# Patient Record
Sex: FEMALE | Race: WHITE | Employment: OTHER | URBAN - METROPOLITAN AREA
[De-identification: names, ages, dates, MRNs, and addresses within clinical notes are randomized per-mention and may not be internally consistent; named-entity substitution may affect disease eponyms.]

---

## 2017-01-16 PROBLEM — H72.90 CHRONIC TUBOTYMPANIC DISEASE WITH ANTERIOR PERFORATION OF TYMPANIC MEMBRANE: Chronic | Status: ACTIVE | Noted: 2017-01-16

## 2017-01-16 PROBLEM — R73.09 ELEVATED HEMOGLOBIN A1C: Chronic | Status: ACTIVE | Noted: 2017-01-16

## 2017-01-16 PROBLEM — H66.10 CHRONIC TUBOTYMPANIC DISEASE WITH ANTERIOR PERFORATION OF TYMPANIC MEMBRANE: Chronic | Status: ACTIVE | Noted: 2017-01-16

## 2017-01-16 PROBLEM — R73.09 ELEVATED HEMOGLOBIN A1C: Status: ACTIVE | Noted: 2017-01-16

## 2017-06-30 ENCOUNTER — HOSPITAL ENCOUNTER (OUTPATIENT)
Dept: MAMMOGRAPHY | Age: 72
Discharge: HOME | End: 2017-06-30
Attending: INTERNAL MEDICINE
Payer: MEDICARE

## 2017-06-30 DIAGNOSIS — Z12.31 ENCOUNTER FOR SCREENING MAMMOGRAM FOR MALIGNANT NEOPLASM OF BREAST: ICD-10-CM

## 2017-06-30 PROCEDURE — 77067 SCR MAMMO BI INCL CAD: CPT

## 2017-08-01 PROBLEM — F33.0 MILD EPISODE OF RECURRENT MAJOR DEPRESSIVE DISORDER (HCC): Chronic | Status: ACTIVE | Noted: 2017-08-01

## 2018-02-22 PROBLEM — E55.9 VITAMIN D DEFICIENCY: Chronic | Status: ACTIVE | Noted: 2018-02-22

## 2018-11-06 ENCOUNTER — HOSPITAL ENCOUNTER (OUTPATIENT)
Dept: MAMMOGRAPHY | Age: 73
Discharge: HOME | End: 2018-11-06
Payer: MEDICARE

## 2018-11-06 DIAGNOSIS — Z78.0 POST-MENOPAUSAL: ICD-10-CM

## 2018-11-06 DIAGNOSIS — Z12.31 VISIT FOR SCREENING MAMMOGRAM: ICD-10-CM

## 2018-11-06 PROCEDURE — 77080 DXA BONE DENSITY AXIAL: CPT

## 2018-11-06 PROCEDURE — 77063 BREAST TOMOSYNTHESIS BI: CPT

## 2018-11-10 NOTE — PROGRESS NOTES
Your mammogram shows a 13 mm oval nodule in the left breast that appears like a cyst.  It has not changed in the appearance. There is no other significant findings on your mammogram.  Recommend repeat in one year.

## 2018-11-10 NOTE — PROGRESS NOTES
Your bone density scan shows improvement in the density of the bones since the last bone density in 2014. Continue your calcium and vitamin D supplementation.

## 2019-02-18 ENCOUNTER — HOSPITAL ENCOUNTER (OUTPATIENT)
Dept: GENERAL RADIOLOGY | Age: 74
Discharge: HOME | End: 2019-02-18

## 2019-02-18 DIAGNOSIS — M79.672 CHRONIC PAIN IN LEFT FOOT: ICD-10-CM

## 2019-02-18 DIAGNOSIS — M25.551 CHRONIC HIP PAIN, RIGHT: ICD-10-CM

## 2019-02-18 DIAGNOSIS — G89.29 CHRONIC PAIN IN LEFT FOOT: ICD-10-CM

## 2019-02-18 DIAGNOSIS — G89.29 CHRONIC HIP PAIN, RIGHT: ICD-10-CM

## 2019-02-18 NOTE — PROGRESS NOTES
Your foot x-ray shows degenerative joint space narrowing on the toe joints. No evidence of fracture.

## 2019-06-04 ENCOUNTER — HOSPITAL ENCOUNTER (OUTPATIENT)
Dept: LAB | Age: 74
Discharge: HOME | End: 2019-06-04

## 2019-06-04 PROCEDURE — 88305 TISSUE EXAM BY PATHOLOGIST: CPT

## 2019-07-10 ENCOUNTER — HOSPITAL ENCOUNTER (OUTPATIENT)
Dept: PHYSICAL THERAPY | Age: 74
Discharge: HOME | End: 2019-07-10
Payer: MEDICARE

## 2019-07-10 PROCEDURE — 97110 THERAPEUTIC EXERCISES: CPT

## 2019-07-10 PROCEDURE — 97161 PT EVAL LOW COMPLEX 20 MIN: CPT

## 2019-07-10 NOTE — PROGRESS NOTES
Anaid Beal  : 1945  Primary: Avis Thurman Medicare Advantage  Secondary:  [address] at [facility]  [address] , [address], [place] [address]  [phone]   [phone]                                  OUTPATIENT PHYSICAL THERAPY: Daily Treatment Note 7/10/2019  ICD-10: Treatment Diagnosis: low back pain (M54.5)  Precautions/Allergies:   Demerol [meperidine]   TREATMENT PLAN:  Effective Dates: 7/10/2019 TO 8/10/2019 (30 days). Frequency/Duration: 2 times a week for 30 Day(s)     MEDICAL/REFERRING DIAGNOSIS:  Other specified dorsopathies, site unspecified [M53.80]  Spinal stenosis, lumbar region without neurogenic claudication [M48.061]  Spondylosis without myelopathy or radiculopathy, lumbosacral region [M47.817]  Other intervertebral disc degeneration, lumbar region [M51.36]   DATE OF ONSET: 2.5 years  REFERRING PHYSICIAN: Sadie Dorantse MD MD Orders: Jung Moses and Treat  Return MD Appointment:  Not scheduled     Pre-treatment Symptoms/Complaints:  Anaid Beal complains of left sided low back pain with ER of right hip. Pain: Initial: Pain Intensity 1: 2  Pain Location 1: Back  Post Session:  2/10   Medications Last Reviewed:  7/10/2019  Updated Objective Findings:  See today's eval   TREATMENT:   THERAPEUTIC EXERCISE: (15 minutes):  Exercises per grid below to improve mobility, strength and coordination. Required minimal verbal cues to promote proper body mechanics. Progressed resistance, range and repetitions as indicated. Access Code: [email or web address]   URL: [email or web address]   Date: 07/10/2019   Prepared by:  Toro Trevino     Exercises   Supine Lower Trunk Rotation - 10 reps - 2x daily   Supine Single Knee to Chest Stretch - 10 reps - 3 hold - 2x daily   Supine Piriformis Stretch with Foot on Ground - 10 reps - 3 hold - 2x daily   Sidelying Open Book Thoracic Rotation with Knee on Foam Roll - 10 reps - 2x daily      Date:  7-10-19 Date:   Date:     Activity/Exercise Parameters Parameters Parameters   HEP As above                                           Treatment/Session Summary:    · Response to Treatment: Dino Gonzalez demonstrates good understanding of initial HEP. · Communication/Consultation:  HEP  · Equipment provided today:  HEP instruction sheet  · Recommendations/Intent for next treatment session: Next visit will focus on lumbar stabilization.     Total Treatment Billable Duration:  15 min therex  PT Patient Time In/Time Out  Time In: 0945  Time Out: Sunny Quigley PT    Future Appointments   Date Time Provider Sil Briceno   7/16/2019  9:00 AM Merlin Espinoza PT, DPT [facility]   7/18/2019  9:45 AM Merlin Espinoza PT, DPT [facility]   7/23/2019  9:45 AM J Carlos Dolan PT [facility]   7/25/2019  9:45 AM J Carlos Dolan PT [facility]   7/30/2019  9:45 AM Merlin Espinoza PT, DPT [facility]   8/1/2019  9:45 AM J Carlos Dolan PT [facility]   8/6/2019  9:45 AM Merlin Espinoza PT, DPT [facility]   8/8/2019  9:45 AM Merlin Espinoza PT, DPT [facility]   10/8/2019  7:50 AM IMD LAB [facility] IMD IMD   10/15/2019 10:00 AM Tracy Herrera NP [facility] IMD IMD

## 2019-07-10 NOTE — THERAPY EVALUATION
Samiautumn Aguilar : 1945 Primary: Justin Graves Medicare Advantage Secondary:  [facility], [address], [place] [address] [phone]   [phone] OUTPATIENT PHYSICAL THERAPY:Initial Assessment 7/10/2019 ICD-10: Treatment Diagnosis: low back pain (M54.5) Precautions/Allergies:  
Demerol [meperidine] TREATMENT PLAN: 
Effective Dates: 7/10/2019 TO 8/10/2019 (30 days). Frequency/Duration: 2 times a week for 30 Day(s) MEDICAL/REFERRING DIAGNOSIS: 
Other specified dorsopathies, site unspecified [M53.80] Spinal stenosis, lumbar region without neurogenic claudication [M48.061] Spondylosis without myelopathy or radiculopathy, lumbosacral region [M47.817] Other intervertebral disc degeneration, lumbar region [M51.36] DATE OF ONSET: 2.5 years REFERRING PHYSICIAN: Oc Benitez MD MD Orders: Ana Maria Lazcano and Treat Return MD Appointment:  Not scheduled INITIAL ASSESSMENT:  Ms. Jose F Lopez presents with complaints of intermittent  left sided low back pain. Pt reports pain usually occurs when she externally rotates her right hip. She reports she can obtain relief with pulling right knee towards left shoulder. Pt may benefit from PT to address the following problem list.  
PROBLEM LIST (Impacting functional limitations): 1. Decreased ADL/Functional Activities 2. Increased Pain 3. Decreased Activity Tolerance 4. Decreased Flexibility/Joint Mobility INTERVENTIONS PLANNED: 
1. Home Exercise Program (HEP) 2. Manual Therapy 3. Therapeutic Exercise/Strengthening GOALS: (Goals have been discussed and agreed upon with patient.) Short-Term Functional Goals: Time Frame: 2 weeks 1. Independent in initial HEP 2. Increase lumbar flexion to at lest 85% Discharge Goals: Time Frame: 4 weeks 1. Independent in advanced HEP 2. Minimal pain with stand /walk up to 1 hour 3.  Demonstrate 100% lumbar ROM 
CLINICAL DECISION MAKING:  
 Outcome Measure: Tool Used: Modified Oswestry Low Back Pain Questionnaire Score:  Initial: 18/50  (Date: 7-11-19 ) Most Recent: X/50 (Date: -- ) Interpretation of Score: Each section is scored on a 0-5 scale, 5 representing the greatest disability. The scores of each section are added together for a total score of 50. Medical Necessity:  
· Patient demonstrates good rehab potential due to higher previous functional level. Reason for Services/Other Comments: 
· Patient continues to require modification of therapeutic interventions to increase complexity of exercises. PT Patient Time In/Time Out Time In: 0945 Time Out: 1015 Rehabilitation Potential For Stated Goals: Good Regarding Grey Evans's therapy, I certify that the treatment plan above will be carried out by a therapist or under their direction. Thank you for this referral, 
Man Cohen, PT Referring Physician Signature: Daniel Bond MD              Date The information in this section was collected on 7-11-19 (except where otherwise noted). HISTORY:  
History of Present Injury/Illness (Reason for Referral): 
Atha Solid reports insidious onset of low back pain and sciatica on the left. Injection provided good relief x 1 year. Pain has gradually returned, but so far no radiating pain. Past Medical History/Comorbidities: Ms. 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 Ms. Kia Hernandez  has a past surgical history that includes hx hysterectomy; hx tonsillectomy; and hx colonoscopy. Social History/Living Environment:  
  Lives with spouse in [address] [place] residence. Prior Level of Function/Work/Activity: 
Retired but active walker Dominant Side:  
      [facility] H2 Model Falls Risk Assessment Risk Factors: 
     No Risk Factors Identified Ability to Rise from Chair: 
     (0)  Ability to rise in a single movement Falls Prevention Plan: No modifications necessary Total: (5 or greater = High Risk): 0  
 ©2010 [facility] . [facility] States Patent #[id number]. Federal Law prohibits the replication, distribution or use without written permission from [facility] Possibility Space Current Medications:   
  
Current Outpatient Medications:  
  ergocalciferol (ERGOCALCIFEROL) 50,000 unit capsule, Take 1 Cap by mouth every Monday and Thursday. , Disp: 24 Cap, Rfl: 3 
  pravastatin (PRAVACHOL) 40 mg tablet, Take 1 Tab by mouth daily. , Disp: 90 Tab, Rfl: 3 
  meloxicam (MOBIC) 15 mg tablet, Take 1 Tab by mouth daily. , Disp: 90 Tab, Rfl: 3 
  pantoprazole (PROTONIX) 40 mg tablet, Take 1 Tab by mouth daily. , Disp: 90 Tab, Rfl: 3 
  ALPRAZolam (XANAX) 0.5 mg tablet, Take 1 Tab by mouth nightly as needed for Anxiety. Max Daily Amount: 0.5 mg., Disp: 30 Tab, Rfl: 5 
  aspirin delayed-release 81 mg tablet, Take 81 mg by mouth daily. , Disp: , Rfl:  
  citalopram (CELEXA) 20 mg tablet, Take 1 Tab by mouth daily.  Indications: major depressive disorder, Disp: 90 Tab, Rfl: 3 
  fluticasone (FLONASE) 50 mcg/actuation nasal spray, 2 Sprays by Both Nostrils route nightly. Indications: Allergic Rhinitis, Disp: 3 Bottle, Rfl: 3 
  clotrimazole (LOTRIMIN) 1 % topical cream, Apply  to affected area two (2) times a day., Disp: , Rfl:  
  psyllium (METAMUCIL) powd, Take 10 mL by mouth daily. , Disp: , Rfl:  
  vitamin E (AQUA GEMS) 400 unit capsule, Take 400 Units by mouth daily. , Disp: , Rfl:  
  CALCIUM CARBONATE/VITAMIN D3 (CALCIUM 600 + D,3, PO), Take 1 Tab by mouth daily. , Disp: , Rfl:  
  SENNOSIDES (SENNA LAX PO), Take 1 Tab by mouth daily. , Disp: , Rfl:  
  MULTIVITAMIN PO, Take 1 Tab by mouth daily. , Disp: , Rfl:   
Date Last Reviewed:  7/10/2019 Number of Personal Factors/Comorbidities that affect the Plan of Care: 0: LOW COMPLEXITY EXAMINATION:  
Observation/Orthostatic Postural Assessment:   
      Rigid erect seated posture Palpation: Moderate TTP right piriformis ROM:   
      Lumbar ROM within functional limits with exception of flexion limited to 75% with increased left paraspinal pain. BLE full AROM including right hip. Strength: BLE's 5/5 strength Special Tests:   
      SLR (-) BLE right hip compression (-) Body Structures Involved: 1. Joints 2. Muscles Body Functions Affected: 1. Movement Related Activities and Participation Affected: 1. General Tasks and Demands Number of elements (examined above) that affect the Plan of Care: 1-2: LOW COMPLEXITY CLINICAL PRESENTATION:  
Presentation: Stable and uncomplicated: LOW COMPLEXITY Use of outcome tool(s) and clinical judgement create a POC that gives a: Clear prediction of patient's progress: LOW COMPLEXITY Treatment/Session Assessment:   
· Response to Treatment:  Pt reports understanding of and agreement with treatment plan. · Compliance with Program/Exercises: Will assess as treatment progresses. · Recommendations/Intent for next treatment session: \"Next visit will focus on advancements to more challenging activities\". Future Appointments Date Time Provider Sil Kaity 7/16/2019  9:00 AM Rodas Crooked, PT, DPT [facility]  
7/18/2019  9:45 AM Rodas Crooked, PT, DPT [facility]  
7/23/2019  9:45 AM Zane Rojas, PT [facility]  
7/25/2019  9:45 AM Zane Rojas, PT [facility]  
7/30/2019  9:45 AM Rodas Crooked, PT, DPT [facility]  
8/1/2019  9:45 AM Zane Rojas, PT [facility]  
8/6/2019  9:45 AM Rodas Crooked, PT, DPT [facility]  
8/8/2019  9:45 AM Rodas Crooked, PT, DPT [facility]  
10/8/2019  7:50 AM IMD LAB [facility] IMD IMD  
10/15/2019 10:00 AM Suzanne Cueva NP [facility] IMD IMD  
 
Please explain any variance from Plan of Care.  
Total Treatment Duration: 25 min daniele Uribe, PT

## 2019-07-18 ENCOUNTER — HOSPITAL ENCOUNTER (OUTPATIENT)
Dept: PHYSICAL THERAPY | Age: 74
Discharge: HOME | End: 2019-07-18
Payer: MEDICARE

## 2019-07-18 PROCEDURE — 97140 MANUAL THERAPY 1/> REGIONS: CPT

## 2019-07-18 PROCEDURE — 97110 THERAPEUTIC EXERCISES: CPT

## 2019-07-18 NOTE — PROGRESS NOTES
Emily Pope  : 1945  Primary: Briana Guerra Medicare Advantage  Secondary:  [address] at [facility]  [address] , [address], [place] [address]  [phone]   [phone]                                  OUTPATIENT PHYSICAL THERAPY: Daily Treatment Note 2019  ICD-10: Treatment Diagnosis: low back pain (M54.5)  Precautions/Allergies:   Demerol [meperidine]   TREATMENT PLAN:  Effective Dates: 7/10/2019 TO 8/10/2019 (30 days). Frequency/Duration: 2 times a week for 30 Day(s)     MEDICAL/REFERRING DIAGNOSIS:  Other specified dorsopathies, site unspecified [M53.80]  Spinal stenosis, lumbar region without neurogenic claudication [M48.061]  Spondylosis without myelopathy or radiculopathy, lumbosacral region [M47.817]  Other intervertebral disc degeneration, lumbar region [M51.36]   DATE OF ONSET: 2.5 years  REFERRING PHYSICIAN: Nicky Almeida MD MD Orders: Kely Smiley and Treat  Return MD Appointment:  Not scheduled     Pre-treatment Symptoms/Complaints:  Emily Pope reports increased back symptoms with exercises. Does not know exactly which exercise gives her more pain. Also reports Right groin pain    Pain: Initial: Pain Intensity 1: 2  Pain Location 1: (back and groin) 3/10 Post Session:  2/10 in groin   Medications Last Reviewed:  2019  Updated Objective Findings:  See today's eval   TREATMENT:   THERAPEUTIC EXERCISE: (30 minutes):  Exercises per grid below to improve mobility, strength and coordination. Required minimal verbal cues to promote proper body mechanics. Progressed resistance, range and repetitions as indicated. Access Code: [email or web address]   URL: [email or web address]   Date: 07/10/2019   Prepared by:  Toro Trevino     Exercises   Supine Single Knee to Chest Stretch - 10 reps - 3 hold - 2x daily   Supine Piriformis Stretch with Foot on Ground - 10 reps - 3 hold - 2x daily   Lumbar flexion/ double knee to  Chest for back decompression     Date:  7-10-19 Date:  7-18-19 Date:     Activity/Exercise Parameters Parameters Parameters   HEP As above As above    Single knee to chest  60 sec    Double knee to chest  60 sec, 2x    Core stabilization  90/90 position, feet supported on table, core contraction w/ adductor squeeze, 10 sec hold, 10x    Lumbar flexion  10x , in sitting stabilized pelvis from moving                  Manual therapy (10 min): to improve mobility  · R hip inferior glide, grade III    Treatment/Session Summary:    · Response to Treatment: Roman Corey tolerated the session well. Her pain seemed to be increased with extension related activities and relieved with flexion at lumbar spine. She still had R groin pain after the session that was difficult to determine whether it was hip joint or lumbar spine related. Discussed for her to be more aware of what increases and decreases that pain. · Communication/Consultation:  HEP  · Equipment provided today:  HEP instruction sheet  · Recommendations/Intent for next treatment session: Next visit will focus on lumbar stabilization.     Total Treatment Billable Duration:  30 min therex, 10 minn  PT Patient Time In/Time Out  Time In: 2158  Time Out: [address], PT, DPT    Future Appointments   Date Time Provider Sil Briceno   7/23/2019  9:45 AM Cannuzhat Moncada, PT [facility]   7/25/2019  9:45 AM Cannuzhat Moncada, PT [facility]   7/30/2019  9:45 AM Alexia Duarte, PT, DPT [facility]   8/1/2019  9:45 AM Cannuzhat Moncada, PT [facility]   8/6/2019  9:45 AM Alexia Duarte, PT, DPT [facility]   8/8/2019  9:45 AM Alexia Duarte, PT, DPT [facility]   10/8/2019  7:50 AM IMD LAB [facility] I[facility] IMD   10/15/2019 10:00 AM Renu Hurd NP [facility] I[facility] IMD

## 2019-07-24 ENCOUNTER — HOSPITAL ENCOUNTER (OUTPATIENT)
Dept: PHYSICAL THERAPY | Age: 74
Discharge: HOME | End: 2019-07-24
Payer: MEDICARE

## 2019-07-24 PROCEDURE — 97110 THERAPEUTIC EXERCISES: CPT

## 2019-07-24 NOTE — PROGRESS NOTES
Roman Corey  : 1945  Primary: Soniya David Medicare Advantage  Secondary:  [address] at [address]  [facility] [address], [place] [address]  [phone]   [phone]                                  OUTPATIENT PHYSICAL THERAPY: Daily Treatment Note 2019  ICD-10: Treatment Diagnosis: low back pain (M54.5)  Precautions/Allergies:   Demerol [meperidine]   TREATMENT PLAN:  Effective Dates: 7/10/2019 TO 8/10/2019 (30 days). Frequency/Duration: 2 times a week for 30 Day(s)     MEDICAL/REFERRING DIAGNOSIS:  Other specified dorsopathies, site unspecified [M53.80]  Spinal stenosis, lumbar region without neurogenic claudication [M48.061]  Spondylosis without myelopathy or radiculopathy, lumbosacral region [M47.817]  Other intervertebral disc degeneration, lumbar region [M51.36]   DATE OF ONSET: 2.5 years  REFERRING PHYSICIAN: Vivian Aponte MD MD Orders: Mee Brunson and Treat  Return MD Appointment:  Not scheduled     Pre-treatment Symptoms/Complaints:  Roman Corey reports less back symptoms with change in exercises. Pain: Initial: Pain Intensity 1: 2  Pain Location 1: Back  Post Session:  2/10   Medications Last Reviewed:  2019  Updated Objective Findings:  See today's eval   TREATMENT:   THERAPEUTIC EXERCISE: (40 minutes):  Exercises per grid below to improve mobility, strength and coordination. Required minimal verbal cues to promote proper body mechanics. Progressed resistance, range and repetitions as indicated. Access Code: [email or web address]   URL: [email or web address]   Date: 07/10/2019   Prepared by:  Toro Trevino     Exercises   Supine Single Knee to Chest Stretch - 10 reps - 3 hold - 2x daily   Supine Piriformis Stretch with Foot on Ground - 10 reps - 3 hold - 2x daily   Lumbar flexion/ double knee to  Chest for back decompression     Date:  7-10-19 Date:  7-18-19 Date:  7-23-19   Activity/Exercise Parameters Parameters Parameters   HEP As above As above    Recumbent stepper   Level 1 x 10'   Ant step ups   X 10 BLE   Lat step ups   X 10 BLE   Ant heel taps   X 10 BLE   Single knee to chest  60 sec 10 x 5\" BLE   Double knee to chest  60 sec, 2x X 10   Supine piriformis   10 x 5\" BLE   Supine hip add ball squeeze   X 10   Supine hip abd TB   RTB x 10   Bridges   X 10   Core stabilization  90/90 position, feet supported on table, core contraction w/ adductor squeeze, 10 sec hold, 10x    Lumbar flexion  10x , in sitting stabilized pelvis from moving    Isometric hip flex   10 x 5\" BLE           Manual therapy (0 min): to improve mobility  · R hip inferior glide, grade III    Treatment/Session Summary:    · Response to Treatment: Martir Palm tolerated the session well. Pt reported more soreness than pain with additional activities today. · Communication/Consultation:  None today  · Equipment provided today:  None today  · Recommendations/Intent for next treatment session: Next visit will focus on lumbar stabilization.     Total Treatment Billable Duration:  40 min therearian,   PT Patient Time In/Time Out  Time In: 1000  Time Out: [facility], PT    Future Appointments   Date Time Provider Sil Longoriai   7/25/2019  7:00 PM [facility]   7/30/2019  9:45 AM Jared Mosqueda PT, DPT [facility]   8/1/2019  9:45 AM Parker Palacio PT [facility]   8/6/2019  1:00 PM Parker Palacio PT [facility]   8/13/2019  9:45 AM Jared Mosqueda PT, DPT [facility]   10/8/2019  7:50 AM IMD LAB [facility] I[facility] IMD   10/15/2019 10:00 AM Bhavana Nails, NP [facility] IMD

## 2019-07-30 ENCOUNTER — HOSPITAL ENCOUNTER (OUTPATIENT)
Dept: PHYSICAL THERAPY | Age: 74
Discharge: HOME | End: 2019-07-30
Payer: MEDICARE

## 2019-07-30 PROCEDURE — 97110 THERAPEUTIC EXERCISES: CPT

## 2019-07-30 NOTE — PROGRESS NOTES
Ministerio Coyle  : 1945  Primary: Ijeoma Fowler Medicare Advantage  Secondary:  [address] at [facility]  [address] , [address], [place] [address]  [phone]   [phone]                                  OUTPATIENT PHYSICAL THERAPY: Daily Treatment Note 2019  ICD-10: Treatment Diagnosis: low back pain (M54.5)  Precautions/Allergies:   Demerol [meperidine]   TREATMENT PLAN:  Effective Dates: 7/10/2019 TO 8/10/2019 (30 days). Frequency/Duration: 2 times a week for 30 Day(s)     MEDICAL/REFERRING DIAGNOSIS:  Other specified dorsopathies, site unspecified [M53.80]  Spinal stenosis, lumbar region without neurogenic claudication [M48.061]  Spondylosis without myelopathy or radiculopathy, lumbosacral region [M47.817]  Other intervertebral disc degeneration, lumbar region [M51.36]   DATE OF ONSET: 2.5 years  REFERRING PHYSICIAN: Riri Jenkins MD MD Orders: [place] and Treat  Return MD Appointment:  Not scheduled     Pre-treatment Symptoms/Complaints:  Ministerio Coyle reports that she has been doing well. States that she is a little sore from pulling weeds. Pain: Initial:   0/10 at rest Post Session:  0/10   Medications Last Reviewed:  2019  Updated Objective Findings:  See today's eval   TREATMENT:   THERAPEUTIC EXERCISE: (40 minutes):  Exercises per grid below to improve mobility, strength and coordination. Required minimal verbal cues to promote proper body mechanics. Progressed resistance, range and repetitions as indicated. Access Code: [email or web address]   URL: [email or web address]   Date: 07/10/2019   Prepared by:  Toro Trevino     Exercises   Supine Single Knee to Chest Stretch - 10 reps - 3 hold - 2x daily   Supine Piriformis Stretch with Foot on Ground - 10 reps - 3 hold - 2x daily   Lumbar flexion/ double knee to  Chest for back decompression Date:  7-10-19 Date:  7-18-19 Date:  7-23-19 Date:  7-25-19   Activity/Exercise Parameters Parameters Parameters    HEP As above As above  Discussed not bending forward in standing while pulling weeds and instead use a stool or go into half kneeling   Recumbent stepper   Level 1 x 10' Level 1 x10   Ant step ups   X 10 BLE    Lat step ups   X 10 BLE    Ant heel taps   X 10 BLE    Single knee to chest  60 sec 10 x 5\" BLE 10x 5 BLE   Double knee to chest  60 sec, 2x X 10 60 sec   Supine piriformis   10 x 5\" BLE 60 sec, 2x, BLE   Supine hip add ball squeeze   X 10 x10   Supine hip abd TB   RTB x 10    Bridges   X 10 x10   Core stabilization  90/90 position, feet supported on table, core contraction w/ adductor squeeze, 10 sec hold, 10x     Lumbar flexion  10x , in sitting stabilized pelvis from moving  60 sec   Isometric hip flex   10 x 5\" BLE 10 sec, 5x, BLE            Manual therapy (0 min): to improve mobility  · R hip inferior glide, grade III    Treatment/Session Summary:    · Response to Treatment: Kasey Meza tolerated the session well. She did not have any pain throughout the session and stated that she feels a lot better overall. · Communication/Consultation:  None today  · Equipment provided today:  None today  · Recommendations/Intent for next treatment session: Next visit will focus on lumbar stabilization.     Total Treatment Billable Duration:  40 min therex,   PT Patient Time In/Time Out  Time In: 2364  Time Out: [address], PT, DPT    Future Appointments   Date Time Provider Sil Briceno   8/1/2019  9:45 AM Maurilio Ahumada, PT [facility]   8/6/2019  1:00 PM Maurilio Ahumada, PT [facility]   8/13/2019  9:45 AM [facility]ima Pink [facility]   10/8/2019  7:50 AM IMD LAB DARYL IMD IMD   10/15/2019 10:00 AM Harinder Martinez NP [facility] IMD IMD

## 2019-08-08 ENCOUNTER — APPOINTMENT (OUTPATIENT)
Dept: PHYSICAL THERAPY | Age: 74
End: 2019-08-08

## 2019-08-13 ENCOUNTER — APPOINTMENT (OUTPATIENT)
Dept: PHYSICAL THERAPY | Age: 74
End: 2019-08-13

## 2020-06-28 ENCOUNTER — HOSPITAL ENCOUNTER (OUTPATIENT)
Dept: MAMMOGRAPHY | Age: 75
Discharge: HOME | End: 2020-06-28
Payer: COMMERCIAL

## 2020-06-28 DIAGNOSIS — Z12.31 ENCOUNTER FOR SCREENING MAMMOGRAM FOR BREAST CANCER: ICD-10-CM

## 2020-06-28 PROCEDURE — 77067 SCR MAMMO BI INCL CAD: CPT

## 2020-07-01 ENCOUNTER — HOSPITAL ENCOUNTER (OUTPATIENT)
Dept: LAB | Age: 75
Discharge: HOME | End: 2020-07-01

## 2020-07-01 DIAGNOSIS — R10.32 LEFT LOWER QUADRANT ABDOMINAL PAIN: ICD-10-CM

## 2020-10-06 ENCOUNTER — HOSPITAL ENCOUNTER (EMERGENCY)
Age: 75
Discharge: HOME | End: 2020-10-06
Attending: EMERGENCY MEDICINE
Payer: COMMERCIAL

## 2020-10-06 ENCOUNTER — APPOINTMENT (OUTPATIENT)
Dept: GENERAL RADIOLOGY | Age: 75
End: 2020-10-06
Attending: EMERGENCY MEDICINE
Payer: COMMERCIAL

## 2020-10-06 VITALS
SYSTOLIC BLOOD PRESSURE: 122 MMHG | HEIGHT: 60 IN | HEART RATE: 91 BPM | TEMPERATURE: 98.3 F | RESPIRATION RATE: 16 BRPM | DIASTOLIC BLOOD PRESSURE: 74 MMHG | OXYGEN SATURATION: 95 % | WEIGHT: 158 LBS | BODY MASS INDEX: 31.02 KG/M2

## 2020-10-06 DIAGNOSIS — S69.92XA INJURY OF LEFT WRIST, INITIAL ENCOUNTER: Primary | ICD-10-CM

## 2020-10-06 PROCEDURE — 75810000053 HC SPLINT APPLICATION

## 2020-10-06 PROCEDURE — 73110 X-RAY EXAM OF WRIST: CPT

## 2020-10-06 PROCEDURE — 99282 EMERGENCY DEPT VISIT SF MDM: CPT

## 2020-10-06 NOTE — DISCHARGE INSTRUCTIONS
Your x-ray is unremarkable but you do have chronic changes and tenderness over an area of the wrist that is very sensitive. We will splint your wrist regardless. We will need to follow-up with orthopedics for repeat x-rays.

## 2020-10-06 NOTE — ED TRIAGE NOTES
Patient ambulatory to triage with mask in place. Patient reports she tripped at [employer] and injured her left wrist. Pt has a small abrasion to her elbow as well.

## 2020-10-06 NOTE — ED PROVIDER NOTES
The history is provided by the patient. Hand Pain    This is a new problem. The current episode started 1 to 2 hours ago. The problem occurs constantly. The problem has not changed since onset. The pain is present in the left wrist. The quality of the pain is described as aching. The pain is at a severity of 3/10. The pain is moderate. Associated symptoms include limited range of motion and stiffness. Pertinent negatives include no numbness, no tingling, no itching, no back pain and no neck pain. The symptoms are aggravated by palpation and movement. She has tried nothing for the symptoms. The treatment provided no relief. There has been a history of trauma.         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    Lichen planus     oral    Mixed hyperlipidemia 3/9/2015    Nonspecific elevation of levels of transaminase or lactic acid dehydrogenase (LDH) 3/9/2015    Osteoarthritis of multiple joints 3/15/2016    Osteoporosis 12/4/2015    Pain in joint, lower leg 3/9/2015    Plantar fascial fibromatosis     Prediabetes 6/22/2016    SNHL (sensorineural hearing loss)     Sudden hearing loss of both ears     Tinnitus of both ears        Past Surgical History:   Procedure Laterality Date    HX COLONOSCOPY      HX HYSTERECTOMY      hysterectomy about 30 years ago\"    HX TONSILLECTOMY      tonsillectomy at age 21 or 18\"         Family History:   Problem Relation Age of Onset    Breast Cancer Sister 61    Cancer Sister     Heart Disease Mother     Colon Cancer Mother     Hypertension Mother     Diabetes Mother  Parkinsonism Father     Heart Disease Brother     Cancer Sister         uterus    Cancer Other         Uterine cancer (First degree Relatives)    Other Sister         Nodule removed in stomach and \"bled out\"       Social History     Socioeconomic History    Marital status:      Spouse name: Not on file    Number of children: Not on file    Years of education: Not on file    Highest education level: Not on file   Occupational History    Not on file   Social Needs    Financial resource strain: Not on file    Food insecurity     Worry: Not on file     Inability: Not on file    Transportation needs     Medical: Not on file     Non-medical: Not on file   Tobacco Use    Smoking status: Never Smoker    Smokeless tobacco: Never Used   Substance and Sexual Activity    Alcohol use: No     Alcohol/week: 0.0 standard drinks    Drug use: No    Sexual activity: Not on file   Lifestyle    Physical activity     Days per week: Not on file     Minutes per session: Not on file    Stress: Not on file   Relationships    Social connections     Talks on phone: Not on file     Gets together: Not on file     Attends Sikh service: Not on file     Active member of club or organization: Not on file     Attends meetings of clubs or organizations: Not on file     Relationship status: Not on file    Intimate partner violence     Fear of current or ex partner: Not on file     Emotionally abused: Not on file     Physically abused: Not on file     Forced sexual activity: Not on file   Other Topics Concern    Not on file   Social History Narrative    Not on file         ALLERGIES: Demerol [meperidine]    Review of Systems   Musculoskeletal: Positive for arthralgias, joint swelling and stiffness. Negative for back pain and neck pain. Skin: Negative for itching. Neurological: Negative for tingling and numbness. All other systems reviewed and are negative.       Vitals:    10/06/20 1449   BP: 122/74   Pulse: 91   Resp: 16   Temp: 98.3 °F (36.8 °C)   SpO2: 95%   Weight: 71.7 kg (158 lb)   Height: 5' (1.524 m)            Physical Exam  Vitals signs and nursing note reviewed. Constitutional:       Appearance: She is well-developed. HENT:      Head: Normocephalic and atraumatic. Eyes:      Conjunctiva/sclera: Conjunctivae normal.      Pupils: Pupils are equal, round, and reactive to light. Neck:      Musculoskeletal: Neck supple. Cardiovascular:      Rate and Rhythm: Normal rate and regular rhythm. Pulmonary:      Effort: Pulmonary effort is normal.      Breath sounds: Normal breath sounds. Abdominal:      General: Bowel sounds are normal.      Palpations: Abdomen is soft. Musculoskeletal:         General: Tenderness and signs of injury present. Comments: Tenderness and swelling over the anatomic snuffbox of the left hand   Skin:     General: Skin is warm and dry. Neurological:      Mental Status: She is alert and oriented to person, place, and time. MDM  Number of Diagnoses or Management Options  Injury of left wrist, initial encounter:   Diagnosis management comments: Getting x-rays but the patient will be splinted due to anatomic snuffbox tenderness. Amount and/or Complexity of Data Reviewed  Tests in the radiology section of CPT®: ordered and reviewed (Xr Wrist Lt Ap/lat/obl Min 3v    Result Date: 10/6/2020  Clinical History: The Female patient is 76years old  presenting with symptoms of pain following fall. Comparison:  none Findings: 3 views of the left wrist were obtained. No fracture or dislocation is identified. There are chronic degenerative changes throughout the distal carpal row diverticula involving the first second carpometacarpal articulation. Narrowing of the radiocarpal joint is also noted. Impression: 1.  Chronic degenerative changes throughout the carpal bones.     )  Independent visualization of images, tracings, or specimens: yes    Risk of Complications, Morbidity, and/or Mortality  Presenting problems: moderate  Diagnostic procedures: moderate  Management options: moderate  General comments: 5:41 PM  Called back to room and found that the patient's thumb spica was actually just a thumb splint. Replaced it with a thumb spica    Patient Progress  Patient progress: improved         Splint, Thumb Gutter    Date/Time: 10/6/2020 5:40 PM  Performed by: Sarah Smart MD  Authorized by: Sarah Smart MD     Consent:     Consent obtained:  Verbal    Consent given by:  Patient    Risks discussed:  Discoloration    Alternatives discussed:  No treatment  Pre-procedure details:     Sensation:  Normal    Skin color:  Bruised  Procedure details:     Laterality:  Left    Location:  Hand    Hand:  L hand    Strapping: no      Splint type:  Thumb spica    Supplies:  Elastic bandage and Ortho-Glass  Post-procedure details:     Pain:  Improved    Sensation:  Normal    Skin color:  Still bruised     Patient tolerance of procedure:   Tolerated well, no immediate complications

## 2020-11-02 RX ORDER — SODIUM CHLORIDE 0.9 % (FLUSH) 0.9 %
5-40 SYRINGE (ML) INJECTION EVERY 8 HOURS
Status: CANCELLED | OUTPATIENT
Start: 2020-11-02

## 2020-11-02 RX ORDER — SODIUM CHLORIDE 0.9 % (FLUSH) 0.9 %
5-40 SYRINGE (ML) INJECTION AS NEEDED
Status: CANCELLED | OUTPATIENT
Start: 2020-11-02

## 2020-11-02 NOTE — H&P
Date of Service: 2020-11-02  Work Status:  ????? Allergies:Demerol(unknown)  Medications:ALPRAZolam (0.5 MG); Calcium 600+D3; Citalopram Hydrobromide (20 MG); Dexamethasone;Ergocalciferol (75141 UNIT); Meloxicam (15 MG);Multivitamin Adult;Pantoprazole Sodium (40 MG); Pravastatin Sodium (40 MG); Psyllium; Senna Laxative;traMADol HCl (50 MG, Take 1 tablet(s) by mouth 3 times a day as needed [PRN]);Triamcinolone Acetonide;Vitamin E    CC:  Injury left wrist and thumb    HPI: The patient is a 70-year-old lady who tripped and fell 4 weeks ago injuring the left wrist and thumb region. She had x-rays made the day of the injury which did not show a definite fracture. Additional x-rays made here in 10 days after the injury again did not show a fracture. She is continuing to have pain around the distal dorsal forearm and with movement of her wrist.  She also is continuing to have triggering and locking of the IP joint of the thumb. PE: The patient has obvious triggering of the IP joint of the left thumb with tenderness at the A1 pulley at the base of the thumb. Her other fingers flex and extend well. Her wrist flexes only about 40 or 45  extends about 70  and has pain at extremes of both. She is tender over the dorsal aspect of the wrist and distal radius. Circulation:  There is a palpable radial pulse at the wrist and brislk capillary refill in the finger tips. Neurologic Exam:  Sensation to light touch is intact in the radial, ulnar and median nerve distributions. Skin and Nails: The nails are normal. There are no open wounds, rashes or lesions noted in the extremity. X-Rays: An AP and lateral of the left wrist today 11/2/20 shows the patient to have a definite fracture of the radial styloid. It is extending into the metaphyseal region with some sclerosis around the fracture line. He is completely nondisplaced. Is also seen on the lateral view as well.   There is also fairly marked arthritic change of the trapeziometacarpal joint. No evidence of any intercarpal widening is noted. Radiological Impression: Healing nondisplaced fracture of the left radial styloidNew diagnosis: Fracture of the left radial styloid. Shay Paul Disposition: The problems were discussed with the patient. She has a Velcro on on spica wrist splint and have recommended that she continue using it for the next few weeks until her pain subsides more. Patient is bothered by the triggering of the thumb. I discussed injections versus surgical release and she prefers to go in with the definitive treatment of surgery.   She'll be scheduled for that in the near future as an outpatient to be done under IV sedation and local.

## 2020-11-03 ENCOUNTER — ANESTHESIA EVENT (OUTPATIENT)
Dept: SURGERY | Age: 75
End: 2020-11-03
Payer: COMMERCIAL

## 2020-11-04 ENCOUNTER — ANESTHESIA (OUTPATIENT)
Dept: SURGERY | Age: 75
End: 2020-11-04
Payer: COMMERCIAL

## 2020-11-04 ENCOUNTER — HOSPITAL ENCOUNTER (OUTPATIENT)
Age: 75
Setting detail: OUTPATIENT SURGERY
Discharge: HOME | End: 2020-11-04
Attending: ORTHOPAEDIC SURGERY | Admitting: ORTHOPAEDIC SURGERY
Payer: COMMERCIAL

## 2020-11-04 VITALS
HEART RATE: 70 BPM | BODY MASS INDEX: 30.86 KG/M2 | DIASTOLIC BLOOD PRESSURE: 71 MMHG | SYSTOLIC BLOOD PRESSURE: 167 MMHG | WEIGHT: 158 LBS | RESPIRATION RATE: 16 BRPM | OXYGEN SATURATION: 96 % | TEMPERATURE: 98.2 F

## 2020-11-04 DIAGNOSIS — G89.18 POST-OP PAIN: Primary | ICD-10-CM

## 2020-11-04 PROCEDURE — 77030010507 HC ADH SKN DERMBND J&J -B: Performed by: ORTHOPAEDIC SURGERY

## 2020-11-04 PROCEDURE — 76210000063 HC OR PH I REC FIRST 0.5 HR: Performed by: ORTHOPAEDIC SURGERY

## 2020-11-04 PROCEDURE — 74011000250 HC RX REV CODE- 250: Performed by: NURSE ANESTHETIST, CERTIFIED REGISTERED

## 2020-11-04 PROCEDURE — 2709999900 HC NON-CHARGEABLE SUPPLY: Performed by: ORTHOPAEDIC SURGERY

## 2020-11-04 PROCEDURE — 74011250636 HC RX REV CODE- 250/636: Performed by: ANESTHESIOLOGY

## 2020-11-04 PROCEDURE — 74011250636 HC RX REV CODE- 250/636: Performed by: NURSE ANESTHETIST, CERTIFIED REGISTERED

## 2020-11-04 PROCEDURE — 77030003028 HC SUT VCRL J&J -A: Performed by: ORTHOPAEDIC SURGERY

## 2020-11-04 PROCEDURE — 76060000032 HC ANESTHESIA 0.5 TO 1 HR: Performed by: ORTHOPAEDIC SURGERY

## 2020-11-04 PROCEDURE — 74011000250 HC RX REV CODE- 250: Performed by: ORTHOPAEDIC SURGERY

## 2020-11-04 PROCEDURE — 76010000154 HC OR TIME FIRST 0.5 HR: Performed by: ORTHOPAEDIC SURGERY

## 2020-11-04 PROCEDURE — 74011250637 HC RX REV CODE- 250/637: Performed by: ANESTHESIOLOGY

## 2020-11-04 PROCEDURE — 76210000020 HC REC RM PH II FIRST 0.5 HR: Performed by: ORTHOPAEDIC SURGERY

## 2020-11-04 PROCEDURE — 77030031139 HC SUT VCRL2 J&J -A: Performed by: ORTHOPAEDIC SURGERY

## 2020-11-04 RX ORDER — HYDROMORPHONE HYDROCHLORIDE 2 MG/ML
0.5 INJECTION, SOLUTION INTRAMUSCULAR; INTRAVENOUS; SUBCUTANEOUS
Status: DISCONTINUED | OUTPATIENT
Start: 2020-11-04 | End: 2020-11-04 | Stop reason: HOSPADM

## 2020-11-04 RX ORDER — ACETAMINOPHEN 500 MG
1000 TABLET ORAL ONCE
Status: COMPLETED | OUTPATIENT
Start: 2020-11-04 | End: 2020-11-04

## 2020-11-04 RX ORDER — ONDANSETRON 2 MG/ML
4 INJECTION INTRAMUSCULAR; INTRAVENOUS
Status: DISCONTINUED | OUTPATIENT
Start: 2020-11-04 | End: 2020-11-04 | Stop reason: HOSPADM

## 2020-11-04 RX ORDER — HYDROCODONE BITARTRATE AND ACETAMINOPHEN 5; 325 MG/1; MG/1
1 TABLET ORAL
Qty: 15 TAB | Refills: 0 | Status: SHIPPED | OUTPATIENT
Start: 2020-11-04 | End: 2020-11-07

## 2020-11-04 RX ORDER — SODIUM CHLORIDE 0.9 % (FLUSH) 0.9 %
5-40 SYRINGE (ML) INJECTION AS NEEDED
Status: DISCONTINUED | OUTPATIENT
Start: 2020-11-04 | End: 2020-11-04 | Stop reason: HOSPADM

## 2020-11-04 RX ORDER — LIDOCAINE HYDROCHLORIDE 20 MG/ML
INJECTION, SOLUTION EPIDURAL; INFILTRATION; INTRACAUDAL; PERINEURAL AS NEEDED
Status: DISCONTINUED | OUTPATIENT
Start: 2020-11-04 | End: 2020-11-04 | Stop reason: HOSPADM

## 2020-11-04 RX ORDER — ALBUTEROL SULFATE 0.83 MG/ML
2.5 SOLUTION RESPIRATORY (INHALATION) AS NEEDED
Status: DISCONTINUED | OUTPATIENT
Start: 2020-11-04 | End: 2020-11-04 | Stop reason: HOSPADM

## 2020-11-04 RX ORDER — SODIUM CHLORIDE, SODIUM LACTATE, POTASSIUM CHLORIDE, CALCIUM CHLORIDE 600; 310; 30; 20 MG/100ML; MG/100ML; MG/100ML; MG/100ML
1000 INJECTION, SOLUTION INTRAVENOUS CONTINUOUS
Status: DISCONTINUED | OUTPATIENT
Start: 2020-11-04 | End: 2020-11-04 | Stop reason: HOSPADM

## 2020-11-04 RX ORDER — PROPOFOL 10 MG/ML
INJECTION, EMULSION INTRAVENOUS AS NEEDED
Status: DISCONTINUED | OUTPATIENT
Start: 2020-11-04 | End: 2020-11-04 | Stop reason: HOSPADM

## 2020-11-04 RX ORDER — OXYCODONE HYDROCHLORIDE 5 MG/1
5 TABLET ORAL
Status: DISCONTINUED | OUTPATIENT
Start: 2020-11-04 | End: 2020-11-04 | Stop reason: HOSPADM

## 2020-11-04 RX ORDER — SODIUM CHLORIDE 0.9 % (FLUSH) 0.9 %
5-40 SYRINGE (ML) INJECTION EVERY 8 HOURS
Status: DISCONTINUED | OUTPATIENT
Start: 2020-11-04 | End: 2020-11-04 | Stop reason: HOSPADM

## 2020-11-04 RX ORDER — MIDAZOLAM HYDROCHLORIDE 1 MG/ML
2 INJECTION, SOLUTION INTRAMUSCULAR; INTRAVENOUS
Status: DISCONTINUED | OUTPATIENT
Start: 2020-11-04 | End: 2020-11-04 | Stop reason: HOSPADM

## 2020-11-04 RX ORDER — PROPOFOL 10 MG/ML
INJECTION, EMULSION INTRAVENOUS
Status: DISCONTINUED | OUTPATIENT
Start: 2020-11-04 | End: 2020-11-04 | Stop reason: HOSPADM

## 2020-11-04 RX ORDER — LIDOCAINE HYDROCHLORIDE AND EPINEPHRINE 10; 10 MG/ML; UG/ML
INJECTION, SOLUTION INFILTRATION; PERINEURAL AS NEEDED
Status: DISCONTINUED | OUTPATIENT
Start: 2020-11-04 | End: 2020-11-04 | Stop reason: HOSPADM

## 2020-11-04 RX ORDER — LIDOCAINE HYDROCHLORIDE 10 MG/ML
0.1 INJECTION INFILTRATION; PERINEURAL AS NEEDED
Status: DISCONTINUED | OUTPATIENT
Start: 2020-11-04 | End: 2020-11-04 | Stop reason: HOSPADM

## 2020-11-04 RX ADMIN — PROPOFOL 10 MG: 10 INJECTION, EMULSION INTRAVENOUS at 11:51

## 2020-11-04 RX ADMIN — SODIUM CHLORIDE, SODIUM LACTATE, POTASSIUM CHLORIDE, AND CALCIUM CHLORIDE 1000 ML: 600; 310; 30; 20 INJECTION, SOLUTION INTRAVENOUS at 08:45

## 2020-11-04 RX ADMIN — PROPOFOL 40 MG: 10 INJECTION, EMULSION INTRAVENOUS at 11:53

## 2020-11-04 RX ADMIN — LIDOCAINE HYDROCHLORIDE 20 MG: 20 INJECTION, SOLUTION EPIDURAL; INFILTRATION; INTRACAUDAL; PERINEURAL at 11:51

## 2020-11-04 RX ADMIN — ACETAMINOPHEN 1000 MG: 500 TABLET, FILM COATED ORAL at 10:08

## 2020-11-04 RX ADMIN — PROPOFOL 25 MCG/KG/MIN: 10 INJECTION, EMULSION INTRAVENOUS at 11:51

## 2020-11-04 NOTE — DISCHARGE INSTRUCTIONS
POST OP INSTRUCTIONS      1. Patient has an appointment for 11/13/20 at 9:50 AM at the [facility]. 2.  For problems call Dr Lacey Watson, [facility],  [phone]          Appointments only,  [phone]    3. Ice and elevate the surgical site. 4.  May remove dressings and wash in running water or shower. Then cover the incision with Band-aids. Do not submerge in bath or dish water. DIET  · Clear liquids until no nausea or vomiting; then light diet for the first day. · Advance to regular diet on second day, unless your doctor orders otherwise. · If nausea and vomiting continues, call your doctor. PAIN  · Take pain medication as directed by your doctor. · Call your doctor if pain is NOT relieved by medication      CALL YOUR DOCTOR IF   · Excessive bleeding that does not stop after holding pressure over the area  · Temperature of 101 degrees F or above  · Excessive redness, swelling or bruising, and/ or green or yellow, smelly discharge from incision    AFTER ANESTHESIA   · For the first 24 hours: DO NOT Drive, Drink alcoholic beverages, or Make important decisions. · Be aware of dizziness following anesthesia and while taking pain medication. YOUR DOCTOR'S PHONE NUMBER [phone]      DISCHARGE SUMMARY from Nurse    PATIENT INSTRUCTIONS:    After general anesthesia or intravenous sedation, for 24 hours or while taking prescription Narcotics:  · Limit your activities  · Do not drive and operate hazardous machinery  · Do not make important personal or business decisions  · Do  not drink alcoholic beverages  · If you have not urinated within 8 hours after discharge, please contact your surgeon on call. *  Please give a list of your current medications to your Primary Care Provider. *  Please update this list whenever your medications are discontinued, doses are      changed, or new medications (including over-the-counter products) are added.     * Please carry medication information at all times in case of emergency situations. These are general instructions for a healthy lifestyle:    No smoking/ No tobacco products/ Avoid exposure to second hand smoke    Surgeon General's Warning:  Quitting smoking now greatly reduces serious risk to your health. Obesity, smoking, and sedentary lifestyle greatly increases your risk for illness    A healthy diet, regular physical exercise & weight monitoring are important for maintaining a healthy lifestyle    You may be retaining fluid if you have a history of heart failure or if you experience any of the following symptoms:  Weight gain of 3 pounds or more overnight or 5 pounds in a week, increased swelling in our hands or feet or shortness of breath while lying flat in bed. Please call your doctor as soon as you notice any of these symptoms; do not wait until your next office visit. Recognize signs and symptoms of STROKE:    F-face looks uneven    A-arms unable to move or move unevenly    S-speech slurred or non-existent    T-time-call 911 as soon as signs and symptoms begin-DO NOT go       Back to bed or wait to see if you get better-TIME IS BRAIN. Advance Care Planning  People with COVID-19 may have no symptoms, mild symptoms, such as fever, cough, and shortness of breath or they may have more severe illness, developing severe and fatal pneumonia. As a result, Advance Care Planning with attention to naming a health care decision maker (someone you trust to make healthcare decisions for you if you could not speak for yourself) and sharing other health care preferences is important BEFORE a possible health crisis. Please contact your Primary Care Provider to discuss Advance Care Planning.      Preventing the Spread of Coronavirus Disease 2019 in Homes and Residential Communities  For the most recent information go to [employer].    Prevention steps for People with confirmed or suspected COVID-19 (including persons under investigation) who do not need to be hospitalized  and   People with confirmed COVID-19 who were hospitalized and determined to be medically stable to go home    Your healthcare provider and public health staff will evaluate whether you can be cared for at home. If it is determined that you do not need to be hospitalized and can be isolated at home, you will be monitored by staff from your local or state health department. You should follow the prevention steps below until a healthcare provider or local or state health department says you can return to your normal activities. Stay home except to get medical care  People who are mildly ill with COVID-19 are able to isolate at home during their illness. You should restrict activities outside your home, except for getting medical care. Do not go to work, school, or public areas. Avoid using public transportation, ride-sharing, or taxis. Separate yourself from other people and animals in your home  People: As much as possible, you should stay in a specific room and away from other people in your home. Also, you should use a separate bathroom, if available. Animals: You should restrict contact with pets and other animals while you are sick with COVID-19, just like you would around other people. Although there have not been reports of pets or other animals becoming sick with COVID-19, it is still recommended that people sick with COVID-19 limit contact with animals until more information is known about the virus. When possible, have another member of your household care for your animals while you are sick. If you are sick with COVID-19, avoid contact with your pet, including petting, snuggling, being kissed or licked, and sharing food. If you must care for your pet or be around animals while you are sick, wash your hands before and after you interact with pets and wear a facemask.   Call ahead before visiting your doctor  If you have a medical appointment, call the healthcare provider and tell them that you have or may have COVID-19. This will help the healthcare providers office take steps to keep other people from getting infected or exposed. Wear a facemask  You should wear a facemask when you are around other people (e.g., sharing a room or vehicle) or pets and before you enter a healthcare providers office. If you are not able to wear a facemask (for example, because it causes trouble breathing), then people who live with you should not stay in the same room with you, or they should wear a facemask if they enter your room. Cover your coughs and sneezes  Cover your mouth and nose with a tissue when you cough or sneeze. Throw used tissues in a lined trash can. Immediately wash your hands with soap and water for at least 20 seconds or, if soap and water are not available, clean your hands with an alcohol-based hand  that contains at least 60% alcohol. Clean your hands often  Wash your hands often with soap and water for at least 20 seconds, especially after blowing your nose, coughing, or sneezing; going to the bathroom; and before eating or preparing food. If soap and water are not readily available, use an alcohol-based hand  with at least 60% alcohol, covering all surfaces of your hands and rubbing them together until they feel dry. Soap and water are the best option if hands are visibly dirty. Avoid touching your eyes, nose, and mouth with unwashed hands. Avoid sharing personal household items  You should not share dishes, drinking glasses, cups, eating utensils, towels, or bedding with other people or pets in your home. After using these items, they should be washed thoroughly with soap and water.   Clean all high-touch surfaces everyday  High touch surfaces include counters, tabletops, doorknobs, bathroom fixtures, toilets, phones, keyboards, tablets, and bedside tables. Also, clean any surfaces that may have blood, stool, or body fluids on them. Use a household cleaning spray or wipe, according to the label instructions. Labels contain instructions for safe and effective use of the cleaning product including precautions you should take when applying the product, such as wearing gloves and making sure you have good ventilation during use of the product. Monitor your symptoms  Seek prompt medical attention if your illness is worsening (e.g., difficulty breathing). Before seeking care, call your healthcare provider and tell them that you have, or are being evaluated for, COVID-19. Put on a facemask before you enter the facility. These steps will help the healthcare providers office to keep other people in the office or waiting room from getting infected or exposed. Ask your healthcare provider to call the local or state health department. Persons who are placed under active monitoring or facilitated self-monitoring should follow instructions provided by their local health department or occupational health professionals, as appropriate. When working with your local health department check their available hours. If you have a medical emergency and need to call 911, notify the dispatch personnel that you have, or are being evaluated for COVID-19. If possible, put on a facemask before emergency medical services arrive. Discontinuing home isolation  Patients with confirmed COVID-19 should remain under home isolation precautions until the risk of secondary transmission to others is thought to be low. The decision to discontinue home isolation precautions should be made on a case-by-case basis, in consultation with healthcare providers and state and local health departments.

## 2020-11-04 NOTE — ANESTHESIA PREPROCEDURE EVALUATION
Relevant Problems   No relevant active problems       Anesthetic History   No history of anesthetic complications            Review of Systems / Medical History  Patient summary reviewed and pertinent labs reviewed    Pulmonary  Within defined limits                 Neuro/Psych         Psychiatric history     Cardiovascular              Hyperlipidemia    Exercise tolerance: >4 METS     GI/Hepatic/Renal     GERD           Endo/Other        Obesity and arthritis     Other Findings              Physical Exam    Airway  Mallampati: II  TM Distance: 4 - 6 cm  Neck ROM: normal range of motion        Cardiovascular    Rhythm: regular  Rate: normal      Pertinent negatives: No murmur and peripheral edema   Dental  No notable dental hx       Pulmonary  Breath sounds clear to auscultation               Abdominal         Other Findings            Anesthetic Plan    ASA: 2  Anesthesia type: total IV anesthesia          Induction: Intravenous  Anesthetic plan and risks discussed with: Patient

## 2020-11-04 NOTE — OP NOTES
Trigger Thumb Release     [facility]     11/4/2020    Indications: This is a 76 yrs female with a left trigger  thumb. The patient is admitted for surgery as conservative measures have failed. Pre-operative Diagnosis:   LEFT TRIGGER THUMB              Post-operative Diagnosis:   LEFT TRIGGER THUMB    Procedure:   1. RELEASE OF LEFT TRIGGER THUMB    Surgeon: Rubi Riley MD    Anesthesia: MAC and Local with 1% Lidocaine with epinephrine    Procedure Details: The patient was taken to the operating suite and positioned on the operating table in the supine fashion. The patient was prepped and draped. A timeout was done by the operating team identifing the patient, surgeon, procedure and site. With the patient under IV sedation the area around the A1 pulley and base of the thumb was injected with 1% Lidocaine with epinephrine. A tourniquet was not used. A 1.5 cm transverse incision was then made over the A1 pulley at the base of the left  thumb. Spreading dissection was used to expose the flexor tendon sheath. Care was taken to watch for the digital nerves. Small Ragnal retractors were used for better visualization. The sheath was opened with the tip of the scalpal initially and then the tenotomy scissors were used to open the A1 pulley distally and the tenosynovium proximally. The flexor pollicis longus was inspected. There was longitudinal fraying of the tendon. The patient was able to participate actively in flexing and extending the thumb . There was no evidence of any further triggering. The incision was closed with 4-0 Vicryl Rapid subcuticular stitches and then re-enforced with Dermabond surgical adhesive. A sterile gauze and Luan dressing was applied. The patient was then transferred to the Recovery Room in stable condition    Findings:  Moderate fraying of the FPL Tendon    Tourniquet Time: * No tourniquets in log *      Signed By: Rubi Riley MD

## 2020-11-04 NOTE — ANESTHESIA POSTPROCEDURE EVALUATION
Procedure(s):  LEFT THUMB TRIGGER RELEASE LOCAL W/ MAC.    total IV anesthesia    Anesthesia Post Evaluation      Multimodal analgesia: multimodal analgesia used between 6 hours prior to anesthesia start to PACU discharge  Patient location during evaluation: bedside  Patient participation: complete - patient participated  Level of consciousness: awake and responsive to light touch  Pain management: adequate  Airway patency: patent  Anesthetic complications: no  Cardiovascular status: acceptable, hemodynamically stable, blood pressure returned to baseline and stable  Respiratory status: acceptable, unassisted, spontaneous ventilation and nonlabored ventilation  Hydration status: acceptable  Post anesthesia nausea and vomiting:  controlled      INITIAL Post-op Vital signs:   Vitals Value Taken Time   /70 11/4/2020 12:30 PM   Temp 36.8 °C (98.2 °F) 11/4/2020 12:18 PM   Pulse 66 11/4/2020 12:32 PM   Resp 16 11/4/2020 12:21 PM   SpO2 94 % 11/4/2020 12:32 PM   Vitals shown include unvalidated device data.

## 2020-11-04 NOTE — INTERVAL H&P NOTE
Update History & Physical 
 
The Patient's History and Physical of November 2, 2020, 
 was reviewed with the patient and I examined the patient. There was no change. The surgical site was confirmed by the patient and me. Pt is alert and oriented. Chest: Clear w/o SOB. C/V:  RR&R Plan:  The risk, benefits, expected outcome, and alternative to the recommended procedure have been discussed with the patient. Patient understands and wants to proceed with release of her left trigger thumb.  
 
Electronically signed by Nahun Ojeda MD on 11/4/2020 at 9:14 AM

## 2021-08-03 ENCOUNTER — HOSPITAL ENCOUNTER (OUTPATIENT)
Dept: MAMMOGRAPHY | Age: 76
Discharge: HOME | End: 2021-08-03
Attending: NURSE PRACTITIONER
Payer: MEDICARE

## 2021-08-03 DIAGNOSIS — M85.89 OSTEOPENIA OF MULTIPLE SITES: ICD-10-CM

## 2021-08-03 PROCEDURE — 77080 DXA BONE DENSITY AXIAL: CPT

## 2021-08-31 PROBLEM — R06.02 SHORTNESS OF BREATH: Status: ACTIVE | Noted: 2021-08-31

## 2021-11-19 ENCOUNTER — HOSPITAL ENCOUNTER (EMERGENCY)
Dept: GENERAL RADIOLOGY | Age: 76
Discharge: HOME | End: 2021-11-19
Attending: ORTHOPAEDIC SURGERY
Payer: COMMERCIAL

## 2021-11-19 ENCOUNTER — APPOINTMENT (OUTPATIENT)
Dept: CT IMAGING | Age: 76
End: 2021-11-19
Attending: EMERGENCY MEDICINE
Payer: COMMERCIAL

## 2021-11-19 ENCOUNTER — HOSPITAL ENCOUNTER (OUTPATIENT)
Age: 76
Setting detail: OBSERVATION
Discharge: NURSING, REHAB OR LONG-TERM CARE | End: 2021-11-25
Attending: EMERGENCY MEDICINE | Admitting: ORTHOPAEDIC SURGERY
Payer: COMMERCIAL

## 2021-11-19 ENCOUNTER — ANESTHESIA (OUTPATIENT)
Dept: SURGERY | Age: 76
End: 2021-11-19
Payer: COMMERCIAL

## 2021-11-19 ENCOUNTER — ANESTHESIA EVENT (OUTPATIENT)
Dept: SURGERY | Age: 76
End: 2021-11-19
Payer: COMMERCIAL

## 2021-11-19 ENCOUNTER — APPOINTMENT (OUTPATIENT)
Dept: GENERAL RADIOLOGY | Age: 76
End: 2021-11-19
Attending: ORTHOPAEDIC SURGERY
Payer: COMMERCIAL

## 2021-11-19 ENCOUNTER — APPOINTMENT (OUTPATIENT)
Dept: GENERAL RADIOLOGY | Age: 76
End: 2021-11-19
Attending: EMERGENCY MEDICINE
Payer: COMMERCIAL

## 2021-11-19 DIAGNOSIS — S52.601B TYPE I OR II OPEN FRACTURE OF DISTAL END OF RIGHT ULNA, UNSPECIFIED FRACTURE MORPHOLOGY, INITIAL ENCOUNTER: ICD-10-CM

## 2021-11-19 DIAGNOSIS — F33.0 MILD EPISODE OF RECURRENT MAJOR DEPRESSIVE DISORDER (HCC): Chronic | ICD-10-CM

## 2021-11-19 DIAGNOSIS — S53.125A CLOSED TRAUMATIC POSTERIOR DISLOCATION OF LEFT ELBOW JOINT, INITIAL ENCOUNTER: ICD-10-CM

## 2021-11-19 DIAGNOSIS — S86.002A ACHILLES TENDON INJURY, LEFT, INITIAL ENCOUNTER: ICD-10-CM

## 2021-11-19 DIAGNOSIS — S52.532B TYPE I OR II OPEN COLLES' FRACTURE OF LEFT RADIUS, INITIAL ENCOUNTER: ICD-10-CM

## 2021-11-19 DIAGNOSIS — S52.502A CLOSED FRACTURE OF DISTAL END OF LEFT RADIUS, UNSPECIFIED FRACTURE MORPHOLOGY, INITIAL ENCOUNTER: Primary | ICD-10-CM

## 2021-11-19 LAB
ABO + RH BLD: NORMAL
ANION GAP SERPL CALC-SCNC: 11 MMOL/L (ref 7–16)
BASOPHILS # BLD: 0.1 K/UL (ref 0–0.2)
BASOPHILS NFR BLD: 1 % (ref 0–2)
BLOOD GROUP ANTIBODIES SERPL: NORMAL
BUN SERPL-MCNC: 22 MG/DL (ref 8–23)
CALCIUM SERPL-MCNC: 9.1 MG/DL (ref 8.3–10.4)
CHLORIDE SERPL-SCNC: 107 MMOL/L (ref 98–107)
CO2 SERPL-SCNC: 26 MMOL/L (ref 21–32)
COVID-19 RAPID TEST, COVR: NOT DETECTED
CREAT SERPL-MCNC: 0.71 MG/DL (ref 0.6–1)
DIFFERENTIAL METHOD BLD: ABNORMAL
EOSINOPHIL # BLD: 0 K/UL (ref 0–0.8)
EOSINOPHIL NFR BLD: 0 % (ref 0.5–7.8)
ERYTHROCYTE [DISTWIDTH] IN BLOOD BY AUTOMATED COUNT: 12.1 % (ref 11.9–14.6)
GLUCOSE SERPL-MCNC: 122 MG/DL (ref 65–100)
HCT VFR BLD AUTO: 44.4 % (ref 35.8–46.3)
HGB BLD-MCNC: 14.3 G/DL (ref 11.7–15.4)
IMM GRANULOCYTES # BLD AUTO: 0 K/UL (ref 0–0.5)
IMM GRANULOCYTES NFR BLD AUTO: 0 % (ref 0–5)
INR PPP: 1
LYMPHOCYTES # BLD: 1 K/UL (ref 0.5–4.6)
LYMPHOCYTES NFR BLD: 8 % (ref 13–44)
MAGNESIUM SERPL-MCNC: 2.3 MG/DL (ref 1.8–2.4)
MCH RBC QN AUTO: 31.2 PG (ref 26.1–32.9)
MCHC RBC AUTO-ENTMCNC: 32.2 G/DL (ref 31.4–35)
MCV RBC AUTO: 96.7 FL (ref 79.6–97.8)
MONOCYTES # BLD: 0.7 K/UL (ref 0.1–1.3)
MONOCYTES NFR BLD: 5 % (ref 4–12)
NEUTS SEG # BLD: 11.1 K/UL (ref 1.7–8.2)
NEUTS SEG NFR BLD: 86 % (ref 43–78)
NRBC # BLD: 0 K/UL (ref 0–0.2)
PLATELET # BLD AUTO: 229 K/UL (ref 150–450)
PMV BLD AUTO: 10.2 FL (ref 9.4–12.3)
POTASSIUM SERPL-SCNC: 3.5 MMOL/L (ref 3.5–5.1)
PROTHROMBIN TIME: 13.2 SEC (ref 12.6–14.5)
RBC # BLD AUTO: 4.59 M/UL (ref 4.05–5.2)
SODIUM SERPL-SCNC: 144 MMOL/L (ref 136–145)
SOURCE, COVRS: NORMAL
SPECIMEN EXP DATE BLD: NORMAL
TROPONIN-HIGH SENSITIVITY: 4.5 PG/ML (ref 0–14)
WBC # BLD AUTO: 13 K/UL (ref 4.3–11.1)

## 2021-11-19 PROCEDURE — 74011000250 HC RX REV CODE- 250: Performed by: EMERGENCY MEDICINE

## 2021-11-19 PROCEDURE — 77030013708 HC HNDPC SUC IRR PULS STRY –B: Performed by: ORTHOPAEDIC SURGERY

## 2021-11-19 PROCEDURE — 76010010054 HC POST OP PAIN BLOCK: Performed by: ORTHOPAEDIC SURGERY

## 2021-11-19 PROCEDURE — 77030017016 HC DSG ANTIMIC BARR2 S&N -B: Performed by: ORTHOPAEDIC SURGERY

## 2021-11-19 PROCEDURE — 76210000006 HC OR PH I REC 0.5 TO 1 HR: Performed by: ORTHOPAEDIC SURGERY

## 2021-11-19 PROCEDURE — 87635 SARS-COV-2 COVID-19 AMP PRB: CPT

## 2021-11-19 PROCEDURE — 76060000032 HC ANESTHESIA 0.5 TO 1 HR: Performed by: ORTHOPAEDIC SURGERY

## 2021-11-19 PROCEDURE — 2709999900 HC NON-CHARGEABLE SUPPLY: Performed by: ORTHOPAEDIC SURGERY

## 2021-11-19 PROCEDURE — 74011000250 HC RX REV CODE- 250: Performed by: ANESTHESIOLOGY

## 2021-11-19 PROCEDURE — 96375 TX/PRO/DX INJ NEW DRUG ADDON: CPT

## 2021-11-19 PROCEDURE — 73100 X-RAY EXAM OF WRIST: CPT

## 2021-11-19 PROCEDURE — 73080 X-RAY EXAM OF ELBOW: CPT

## 2021-11-19 PROCEDURE — 74011250637 HC RX REV CODE- 250/637: Performed by: PHYSICIAN ASSISTANT

## 2021-11-19 PROCEDURE — 72125 CT NECK SPINE W/O DYE: CPT

## 2021-11-19 PROCEDURE — 99282 EMERGENCY DEPT VISIT SF MDM: CPT | Performed by: PHYSICIAN ASSISTANT

## 2021-11-19 PROCEDURE — 73070 X-RAY EXAM OF ELBOW: CPT

## 2021-11-19 PROCEDURE — 73060 X-RAY EXAM OF HUMERUS: CPT

## 2021-11-19 PROCEDURE — 77030002916 HC SUT ETHLN J&J -A: Performed by: ORTHOPAEDIC SURGERY

## 2021-11-19 PROCEDURE — 74011250636 HC RX REV CODE- 250/636: Performed by: EMERGENCY MEDICINE

## 2021-11-19 PROCEDURE — 80048 BASIC METABOLIC PNL TOTAL CA: CPT

## 2021-11-19 PROCEDURE — 85025 COMPLETE CBC W/AUTO DIFF WBC: CPT

## 2021-11-19 PROCEDURE — 76010000138 HC OR TIME 0.5 TO 1 HR: Performed by: ORTHOPAEDIC SURGERY

## 2021-11-19 PROCEDURE — G0378 HOSPITAL OBSERVATION PER HR: HCPCS

## 2021-11-19 PROCEDURE — 73610 X-RAY EXAM OF ANKLE: CPT

## 2021-11-19 PROCEDURE — 77030033681 HC SPLNT P-CUT SAF BSNM -A: Performed by: ORTHOPAEDIC SURGERY

## 2021-11-19 PROCEDURE — 74011250637 HC RX REV CODE- 250/637: Performed by: ORTHOPAEDIC SURGERY

## 2021-11-19 PROCEDURE — 99285 EMERGENCY DEPT VISIT HI MDM: CPT

## 2021-11-19 PROCEDURE — 83735 ASSAY OF MAGNESIUM: CPT

## 2021-11-19 PROCEDURE — 74011250636 HC RX REV CODE- 250/636: Performed by: ANESTHESIOLOGY

## 2021-11-19 PROCEDURE — 76942 ECHO GUIDE FOR BIOPSY: CPT | Performed by: ORTHOPAEDIC SURGERY

## 2021-11-19 PROCEDURE — C1713 ANCHOR/SCREW BN/BN,TIS/BN: HCPCS | Performed by: ORTHOPAEDIC SURGERY

## 2021-11-19 PROCEDURE — 84484 ASSAY OF TROPONIN QUANT: CPT

## 2021-11-19 PROCEDURE — 93005 ELECTROCARDIOGRAM TRACING: CPT | Performed by: EMERGENCY MEDICINE

## 2021-11-19 PROCEDURE — 77030000032 HC CUF TRNQT ZIMM -B: Performed by: ORTHOPAEDIC SURGERY

## 2021-11-19 PROCEDURE — A4565 SLINGS: HCPCS | Performed by: ORTHOPAEDIC SURGERY

## 2021-11-19 PROCEDURE — 77030010927 HC CLMP EXT ADJ SYNT -G: Performed by: ORTHOPAEDIC SURGERY

## 2021-11-19 PROCEDURE — 86901 BLOOD TYPING SEROLOGIC RH(D): CPT

## 2021-11-19 PROCEDURE — 96374 THER/PROPH/DIAG INJ IV PUSH: CPT

## 2021-11-19 PROCEDURE — 85610 PROTHROMBIN TIME: CPT

## 2021-11-19 PROCEDURE — 74011250636 HC RX REV CODE- 250/636: Performed by: ORTHOPAEDIC SURGERY

## 2021-11-19 PROCEDURE — 74011250636 HC RX REV CODE- 250/636: Performed by: NURSE ANESTHETIST, CERTIFIED REGISTERED

## 2021-11-19 PROCEDURE — 73110 X-RAY EXAM OF WRIST: CPT

## 2021-11-19 PROCEDURE — 96365 THER/PROPH/DIAG IV INF INIT: CPT

## 2021-11-19 PROCEDURE — 70450 CT HEAD/BRAIN W/O DYE: CPT

## 2021-11-19 PROCEDURE — 77030003602 HC NDL NRV BLK BBMI -B: Performed by: ANESTHESIOLOGY

## 2021-11-19 RX ORDER — CEFAZOLIN SODIUM/WATER 2 G/20 ML
2 SYRINGE (ML) INTRAVENOUS
Status: COMPLETED | OUTPATIENT
Start: 2021-11-19 | End: 2021-11-19

## 2021-11-19 RX ORDER — ONDANSETRON 4 MG/1
4 TABLET, ORALLY DISINTEGRATING ORAL
Status: DISCONTINUED | OUTPATIENT
Start: 2021-11-19 | End: 2021-11-25 | Stop reason: HOSPADM

## 2021-11-19 RX ORDER — ALPRAZOLAM 0.5 MG/1
0.5 TABLET ORAL
Status: DISCONTINUED | OUTPATIENT
Start: 2021-11-19 | End: 2021-11-25 | Stop reason: HOSPADM

## 2021-11-19 RX ORDER — FENTANYL CITRATE 50 UG/ML
100 INJECTION, SOLUTION INTRAMUSCULAR; INTRAVENOUS AS NEEDED
Status: DISCONTINUED | OUTPATIENT
Start: 2021-11-19 | End: 2021-11-19 | Stop reason: HOSPADM

## 2021-11-19 RX ORDER — METOCLOPRAMIDE HYDROCHLORIDE 5 MG/ML
10 INJECTION INTRAMUSCULAR; INTRAVENOUS
Status: COMPLETED | OUTPATIENT
Start: 2021-11-19 | End: 2021-11-19

## 2021-11-19 RX ORDER — SODIUM CHLORIDE, SODIUM LACTATE, POTASSIUM CHLORIDE, CALCIUM CHLORIDE 600; 310; 30; 20 MG/100ML; MG/100ML; MG/100ML; MG/100ML
1000 INJECTION, SOLUTION INTRAVENOUS CONTINUOUS
Status: DISCONTINUED | OUTPATIENT
Start: 2021-11-19 | End: 2021-11-19 | Stop reason: HOSPADM

## 2021-11-19 RX ORDER — OXYCODONE HYDROCHLORIDE 5 MG/1
5 TABLET ORAL
Status: DISCONTINUED | OUTPATIENT
Start: 2021-11-19 | End: 2021-11-19 | Stop reason: HOSPADM

## 2021-11-19 RX ORDER — ACETAMINOPHEN 325 MG/1
650 TABLET ORAL
Status: DISCONTINUED | OUTPATIENT
Start: 2021-11-19 | End: 2021-11-25 | Stop reason: HOSPADM

## 2021-11-19 RX ORDER — ACETAMINOPHEN 650 MG/1
650 SUPPOSITORY RECTAL
Status: DISCONTINUED | OUTPATIENT
Start: 2021-11-19 | End: 2021-11-25 | Stop reason: HOSPADM

## 2021-11-19 RX ORDER — LIDOCAINE HYDROCHLORIDE 10 MG/ML
0.1 INJECTION INFILTRATION; PERINEURAL AS NEEDED
Status: DISCONTINUED | OUTPATIENT
Start: 2021-11-19 | End: 2021-11-19 | Stop reason: HOSPADM

## 2021-11-19 RX ORDER — NALOXONE HYDROCHLORIDE 0.4 MG/ML
0.1 INJECTION, SOLUTION INTRAMUSCULAR; INTRAVENOUS; SUBCUTANEOUS AS NEEDED
Status: DISCONTINUED | OUTPATIENT
Start: 2021-11-19 | End: 2021-11-19 | Stop reason: HOSPADM

## 2021-11-19 RX ORDER — HYDROMORPHONE HYDROCHLORIDE 2 MG/ML
0.5 INJECTION, SOLUTION INTRAMUSCULAR; INTRAVENOUS; SUBCUTANEOUS
Status: DISCONTINUED | OUTPATIENT
Start: 2021-11-19 | End: 2021-11-19 | Stop reason: HOSPADM

## 2021-11-19 RX ORDER — PANTOPRAZOLE SODIUM 40 MG/1
40 TABLET, DELAYED RELEASE ORAL
Status: DISCONTINUED | OUTPATIENT
Start: 2021-11-20 | End: 2021-11-25 | Stop reason: HOSPADM

## 2021-11-19 RX ORDER — MIDAZOLAM HYDROCHLORIDE 1 MG/ML
2 INJECTION, SOLUTION INTRAMUSCULAR; INTRAVENOUS
Status: DISCONTINUED | OUTPATIENT
Start: 2021-11-19 | End: 2021-11-19 | Stop reason: HOSPADM

## 2021-11-19 RX ORDER — SODIUM CHLORIDE, SODIUM LACTATE, POTASSIUM CHLORIDE, CALCIUM CHLORIDE 600; 310; 30; 20 MG/100ML; MG/100ML; MG/100ML; MG/100ML
75 INJECTION, SOLUTION INTRAVENOUS CONTINUOUS
Status: DISCONTINUED | OUTPATIENT
Start: 2021-11-19 | End: 2021-11-19 | Stop reason: HOSPADM

## 2021-11-19 RX ORDER — ONDANSETRON 2 MG/ML
4 INJECTION INTRAMUSCULAR; INTRAVENOUS
Status: COMPLETED | OUTPATIENT
Start: 2021-11-19 | End: 2021-11-19

## 2021-11-19 RX ORDER — PRAVASTATIN SODIUM 20 MG/1
40 TABLET ORAL
Status: DISCONTINUED | OUTPATIENT
Start: 2021-11-19 | End: 2021-11-25 | Stop reason: HOSPADM

## 2021-11-19 RX ORDER — PROPOFOL 10 MG/ML
200 INJECTION, EMULSION INTRAVENOUS
Status: DISPENSED | OUTPATIENT
Start: 2021-11-19 | End: 2021-11-20

## 2021-11-19 RX ORDER — SODIUM CHLORIDE 0.9 % (FLUSH) 0.9 %
5-40 SYRINGE (ML) INJECTION AS NEEDED
Status: DISCONTINUED | OUTPATIENT
Start: 2021-11-19 | End: 2021-11-25 | Stop reason: HOSPADM

## 2021-11-19 RX ORDER — OXYCODONE HYDROCHLORIDE 5 MG/1
10 TABLET ORAL
Status: DISCONTINUED | OUTPATIENT
Start: 2021-11-19 | End: 2021-11-19 | Stop reason: HOSPADM

## 2021-11-19 RX ORDER — ROPIVACAINE HYDROCHLORIDE 5 MG/ML
INJECTION, SOLUTION EPIDURAL; INFILTRATION; PERINEURAL
Status: COMPLETED | OUTPATIENT
Start: 2021-11-19 | End: 2021-11-19

## 2021-11-19 RX ORDER — LABETALOL HYDROCHLORIDE 5 MG/ML
20 INJECTION, SOLUTION INTRAVENOUS ONCE
Status: COMPLETED | OUTPATIENT
Start: 2021-11-19 | End: 2021-11-19

## 2021-11-19 RX ORDER — CITALOPRAM 20 MG/1
20 TABLET, FILM COATED ORAL DAILY
Status: DISCONTINUED | OUTPATIENT
Start: 2021-11-20 | End: 2021-11-25 | Stop reason: HOSPADM

## 2021-11-19 RX ORDER — PROPOFOL 10 MG/ML
INJECTION, EMULSION INTRAVENOUS AS NEEDED
Status: DISCONTINUED | OUTPATIENT
Start: 2021-11-19 | End: 2021-11-19 | Stop reason: HOSPADM

## 2021-11-19 RX ORDER — HYDROMORPHONE HYDROCHLORIDE 1 MG/ML
0.5 INJECTION, SOLUTION INTRAMUSCULAR; INTRAVENOUS; SUBCUTANEOUS ONCE
Status: COMPLETED | OUTPATIENT
Start: 2021-11-19 | End: 2021-11-19

## 2021-11-19 RX ORDER — OXYCODONE HYDROCHLORIDE 5 MG/1
10 TABLET ORAL
Status: DISCONTINUED | OUTPATIENT
Start: 2021-11-19 | End: 2021-11-22

## 2021-11-19 RX ORDER — PROPOFOL 10 MG/ML
INJECTION, EMULSION INTRAVENOUS
Status: DISCONTINUED | OUTPATIENT
Start: 2021-11-19 | End: 2021-11-19 | Stop reason: HOSPADM

## 2021-11-19 RX ORDER — FLUTICASONE PROPIONATE 50 MCG
2 SPRAY, SUSPENSION (ML) NASAL DAILY
Status: DISCONTINUED | OUTPATIENT
Start: 2021-11-20 | End: 2021-11-25 | Stop reason: HOSPADM

## 2021-11-19 RX ORDER — POLYETHYLENE GLYCOL 3350 17 G/17G
17 POWDER, FOR SOLUTION ORAL DAILY PRN
Status: DISCONTINUED | OUTPATIENT
Start: 2021-11-19 | End: 2021-11-25 | Stop reason: HOSPADM

## 2021-11-19 RX ORDER — DIPHENHYDRAMINE HYDROCHLORIDE 50 MG/ML
12.5 INJECTION, SOLUTION INTRAMUSCULAR; INTRAVENOUS ONCE
Status: DISCONTINUED | OUTPATIENT
Start: 2021-11-19 | End: 2021-11-19 | Stop reason: HOSPADM

## 2021-11-19 RX ORDER — SODIUM CHLORIDE 0.9 % (FLUSH) 0.9 %
5-40 SYRINGE (ML) INJECTION EVERY 8 HOURS
Status: DISCONTINUED | OUTPATIENT
Start: 2021-11-19 | End: 2021-11-25 | Stop reason: HOSPADM

## 2021-11-19 RX ORDER — ONDANSETRON 2 MG/ML
4 INJECTION INTRAMUSCULAR; INTRAVENOUS ONCE
Status: DISCONTINUED | OUTPATIENT
Start: 2021-11-19 | End: 2021-11-19 | Stop reason: HOSPADM

## 2021-11-19 RX ORDER — ONDANSETRON 2 MG/ML
4 INJECTION INTRAMUSCULAR; INTRAVENOUS
Status: DISCONTINUED | OUTPATIENT
Start: 2021-11-19 | End: 2021-11-25 | Stop reason: HOSPADM

## 2021-11-19 RX ORDER — ENOXAPARIN SODIUM 100 MG/ML
40 INJECTION SUBCUTANEOUS DAILY
Status: DISCONTINUED | OUTPATIENT
Start: 2021-11-20 | End: 2021-11-22

## 2021-11-19 RX ORDER — HYDROMORPHONE HYDROCHLORIDE 1 MG/ML
0.5 INJECTION, SOLUTION INTRAMUSCULAR; INTRAVENOUS; SUBCUTANEOUS
Status: DISCONTINUED | OUTPATIENT
Start: 2021-11-19 | End: 2021-11-20

## 2021-11-19 RX ORDER — MORPHINE SULFATE 4 MG/ML
4 INJECTION INTRAVENOUS ONCE
Status: COMPLETED | OUTPATIENT
Start: 2021-11-19 | End: 2021-11-19

## 2021-11-19 RX ADMIN — FAMOTIDINE 20 MG: 10 INJECTION INTRAVENOUS at 16:01

## 2021-11-19 RX ADMIN — METOCLOPRAMIDE HYDROCHLORIDE 10 MG: 5 INJECTION INTRAMUSCULAR; INTRAVENOUS at 16:00

## 2021-11-19 RX ADMIN — LABETALOL HYDROCHLORIDE 5 MG: 5 INJECTION INTRAVENOUS at 23:31

## 2021-11-19 RX ADMIN — SODIUM CHLORIDE, SODIUM LACTATE, POTASSIUM CHLORIDE, AND CALCIUM CHLORIDE 1000 ML: 600; 310; 30; 20 INJECTION, SOLUTION INTRAVENOUS at 18:45

## 2021-11-19 RX ADMIN — PROPOFOL 30 MG: 10 INJECTION, EMULSION INTRAVENOUS at 20:06

## 2021-11-19 RX ADMIN — PRAVASTATIN SODIUM 40 MG: 20 TABLET ORAL at 23:32

## 2021-11-19 RX ADMIN — CEFAZOLIN SODIUM 2 G: 100 INJECTION, POWDER, LYOPHILIZED, FOR SOLUTION INTRAVENOUS at 16:36

## 2021-11-19 RX ADMIN — Medication 1 AMPULE: at 19:16

## 2021-11-19 RX ADMIN — ROPIVACAINE HYDROCHLORIDE 30 ML: 150 INJECTION, SOLUTION EPIDURAL; INFILTRATION; PERINEURAL at 18:54

## 2021-11-19 RX ADMIN — CEFAZOLIN 2 G: 1 INJECTION, POWDER, FOR SOLUTION INTRAVENOUS at 20:00

## 2021-11-19 RX ADMIN — PROPOFOL 50 MCG/KG/MIN: 10 INJECTION, EMULSION INTRAVENOUS at 20:06

## 2021-11-19 RX ADMIN — MORPHINE SULFATE 4 MG: 4 INJECTION INTRAVENOUS at 16:36

## 2021-11-19 RX ADMIN — SODIUM CHLORIDE 1000 ML: 900 INJECTION, SOLUTION INTRAVENOUS at 16:05

## 2021-11-19 RX ADMIN — ONDANSETRON 4 MG: 2 INJECTION INTRAMUSCULAR; INTRAVENOUS at 14:55

## 2021-11-19 RX ADMIN — HYDROMORPHONE HYDROCHLORIDE 0.5 MG: 1 INJECTION, SOLUTION INTRAMUSCULAR; INTRAVENOUS; SUBCUTANEOUS at 14:56

## 2021-11-19 NOTE — PERIOP NOTES
TRANSFER - IN REPORT:    Verbal report received from MISHA Jackson(name) on Giselle Wei  being received from ER for ordered procedure      Report consisted of patients Situation, Background, Assessment and   Recommendations(SBAR). Information from the following report(s) SBAR and MAR was reviewed with the receiving nurse. Opportunity for questions and clarification was provided. Assessment will be completed upon patients arrival to unit and care assumed.

## 2021-11-19 NOTE — ANESTHESIA PREPROCEDURE EVALUATION
Relevant Problems   RESPIRATORY SYSTEM   (+) Shortness of breath      NEUROLOGY   (+) Depression   (+) Mild episode of recurrent major depressive disorder (HCC)      GASTROINTESTINAL   (+) GERD (gastroesophageal reflux disease)       Anesthetic History   No history of anesthetic complications            Review of Systems / Medical History  Patient summary reviewed and pertinent labs reviewed    Pulmonary  Within defined limits                 Neuro/Psych         Psychiatric history     Cardiovascular              Hyperlipidemia    Exercise tolerance: >4 METS     GI/Hepatic/Renal     GERD           Endo/Other        Obesity and arthritis     Other Findings   Comments: Trauma to LUE with pain and neuropathy           Physical Exam    Airway  Mallampati: II  TM Distance: 4 - 6 cm  Neck ROM: normal range of motion        Cardiovascular    Rhythm: regular  Rate: normal         Dental  No notable dental hx       Pulmonary  Breath sounds clear to auscultation               Abdominal         Other Findings            Anesthetic Plan    ASA: 2  Anesthesia type: total IV anesthesia      Post-op pain plan if not by surgeon: peripheral nerve block single    Induction: Intravenous  Anesthetic plan and risks discussed with: Patient

## 2021-11-19 NOTE — ED TRIAGE NOTES
Patient arrives via [place] [facility] from home after a falling down multiple stairs around 11:45. Negitive for LOC.

## 2021-11-19 NOTE — INTERVAL H&P NOTE
Update History & Physical    The Patient's History and Physical of 11/19/2021 was reviewed with the patient and I examined the patient. There was no change. The surgical site was confirmed by the patient and me. Plan:  The risk, benefits, expected outcome, and alternative to the recommended procedure have been discussed with the patient. Patient understands and wants to proceed with debridement of left wrist open left wrist fracture and external fixation of left wrist fracture and closed reduction of left elbow dislocation.     Electronically signed by Niecy Antoine MD on 11/19/2021 at 6:03 PM

## 2021-11-19 NOTE — ED PROVIDER NOTES
Chief complaint : Left arm pain    HISTORY OF PRESENT ILLNESS :  Location : Left elbow, left wrist    Quality : Grossly deformed    Quantity : Constant    Timing : Maybe 1 hour prior to arrival    Severity : Severe    Context : Danna Nelson down about 5 wooden steps landing on a concrete surface below  No neck pain however c-collar was placed by EMS    Alleviating / exacerbating factors : Mild relief with some fentanyl from EMS    Associated Symptoms : No nausea, no vomiting, no headache             Past Medical History:   Diagnosis Date    Allergic rhinitis     Chronic pain     left hip pain- a lot better    Depression     Deviated septum     Disorder of bone and cartilage, unspecified 3/9/2015    Generalized anxiety disorder     GERD (gastroesophageal reflux disease)     on meication for this    History of hand fracture 2004    Intestinal disaccharidase deficiencies and disaccharide malabsorption 8/7/4311    Lichen planus     oral    Mixed hyperlipidemia 3/9/2015    Nonspecific elevation of levels of transaminase or lactic acid dehydrogenase (LDH) 3/9/2015    Osteoarthritis of multiple joints 3/15/2016    Osteoporosis 12/4/2015    Pain in joint, lower leg 3/9/2015    Plantar fascial fibromatosis     Prediabetes 06/22/2016    no current meds, pt doesnt monitor BS    SNHL (sensorineural hearing loss)     Sudden hearing loss of both ears     HA to nicole ears    Tinnitus of both ears        Past Surgical History:   Procedure Laterality Date    HX COLONOSCOPY      HX HYSTERECTOMY      hysterectomy about 30 years ago\"    HX ORTHOPAEDIC Right     wrist surgery    HX TONSILLECTOMY      tonsillectomy at age 21 or 18\"         Family History:   Problem Relation Age of Onset    Breast Cancer Sister 61    Cancer Sister     Heart Disease Mother     Colon Cancer Mother     Hypertension Mother     Diabetes Mother     Parkinsonism Father     Heart Disease Brother     Cancer Sister         uterus    Cancer Other         Uterine cancer (First degree Relatives)    Other Sister         Nodule removed in stomach and \"bled out\"       Social History     Socioeconomic History    Marital status:      Spouse name: Not on file    Number of children: Not on file    Years of education: Not on file    Highest education level: Not on file   Occupational History    Not on file   Tobacco Use    Smoking status: Never Smoker    Smokeless tobacco: Never Used   Substance and Sexual Activity    Alcohol use: No     Alcohol/week: 0.0 standard drinks    Drug use: No    Sexual activity: Not on file   Other Topics Concern    Not on file   Social History Narrative    Not on file     Social Determinants of Health     Financial Resource Strain:     Difficulty of Paying Living Expenses: Not on file   Food Insecurity:     Worried About Running Out of Food in the Last Year: Not on file    Raul of Food in the Last Year: Not on file   Transportation Needs:     Lack of Transportation (Medical): Not on file    Lack of Transportation (Non-Medical):  Not on file   Physical Activity:     Days of Exercise per Week: Not on file    Minutes of Exercise per Session: Not on file   Stress:     Feeling of Stress : Not on file   Social Connections:     Frequency of Communication with Friends and Family: Not on file    Frequency of Social Gatherings with Friends and Family: Not on file    Attends Spiritism Services: Not on file    Active Member of [address] [place] [address] or Organizations: Not on file    Attends Club or Organization Meetings: Not on file    Marital Status: Not on file   Intimate Partner Violence:     Fear of Current or Ex-Partner: Not on file    Emotionally Abused: Not on file    Physically Abused: Not on file    Sexually Abused: Not on file   Housing Stability:     Unable to Pay for Housing in the Last Year: Not on file    Number of [place] in the Last Year: Not on file    Unstable Housing in the Last Year: Not on file ALLERGIES: Amoxicillin and Demerol [meperidine]    Review of Systems   Constitutional: Negative for chills and fever. HENT: Negative for dental problem, nosebleeds, rhinorrhea and sore throat. Eyes: Negative for pain, discharge and redness. Respiratory: Negative for cough, shortness of breath and stridor. Cardiovascular: Negative for chest pain and palpitations. Gastrointestinal: Negative for abdominal pain, diarrhea, nausea and vomiting. Genitourinary: Negative for difficulty urinating and hematuria. Musculoskeletal: Positive for arthralgias and joint swelling. Negative for back pain, gait problem, myalgias and neck stiffness. Skin: Positive for wound. Negative for pallor and rash. Neurological: Negative for dizziness, syncope, weakness, numbness and headaches. All other systems reviewed and are negative. Vitals:    11/19/21 1333   BP: (!) 149/72   Pulse: 69   Resp: 20   Temp: 97.5 °F (36.4 °C)   SpO2: 94%   Weight: 72.6 kg (160 lb)   Height: 5' (1.524 m)            Physical Exam  Vitals and nursing note reviewed. Constitutional:       General: She is in acute distress. Appearance: Normal appearance. She is well-developed. She is not ill-appearing, toxic-appearing or diaphoretic. HENT:      Head: Normocephalic and atraumatic. Right Ear: External ear normal.      Left Ear: External ear normal.      Mouth/Throat:      Mouth: Mucous membranes are moist.      Pharynx: Oropharynx is clear. No oropharyngeal exudate or posterior oropharyngeal erythema. Eyes:      General: No scleral icterus. Right eye: No discharge. Left eye: No discharge. Extraocular Movements: Extraocular movements intact. Conjunctiva/sclera: Conjunctivae normal.      Pupils: Pupils are equal, round, and reactive to light. Neck:      Thyroid: No thyromegaly. Trachea: Trachea normal.   Cardiovascular:      Rate and Rhythm: Normal rate and regular rhythm.       Heart sounds: Normal heart sounds. No murmur heard. No gallop. Pulmonary:      Effort: Pulmonary effort is normal. No respiratory distress. Breath sounds: Normal breath sounds. No wheezing or rales. Abdominal:      General: Bowel sounds are normal.      Palpations: Abdomen is soft. There is no hepatomegaly, splenomegaly or pulsatile mass. Tenderness: There is no abdominal tenderness. There is no guarding. Musculoskeletal:         General: Swelling, tenderness, deformity and signs of injury present. Left elbow: Decreased range of motion. Tenderness present in olecranon process. Left wrist: Swelling, deformity, laceration, tenderness and bony tenderness present. Decreased range of motion. Arms:       Cervical back: Normal range of motion and neck supple. Normal range of motion. Lymphadenopathy:      Cervical: No cervical adenopathy. Skin:     General: Skin is warm and dry. Neurological:      General: No focal deficit present. Mental Status: She is alert and oriented to person, place, and time. Mental status is at baseline. Motor: No abnormal muscle tone. Comments: cni 2-12 grossly   Psychiatric:         Mood and Affect: Mood normal.         Behavior: Behavior normal.          MDM  Number of Diagnoses or Management Options  Closed fracture of distal end of left radius, unspecified fracture morphology, initial encounter  Closed traumatic posterior dislocation of left elbow joint, initial encounter: new and requires workup  Type I or II open fracture of distal end of right ulna, unspecified fracture morphology, initial encounter: new and requires workup  Diagnosis management comments: Medical decision making note:  Elbow dislocation with both bone distal forearm fracture on the same side. Abrasion versus laceration orthopedics concerned this represents an open fracture and elected to take her to the operating room.   This concludes the \"medical decision making note\" part of this emergency department visit note.          Amount and/or Complexity of Data Reviewed  Tests in the radiology section of CPT®: reviewed and ordered  Decide to obtain previous medical records or to obtain history from someone other than the patient: yes  Obtain history from someone other than the patient: yes ( at bedside)  Discuss the patient with other providers: yes (Orthopedics)    Risk of Complications, Morbidity, and/or Mortality  Presenting problems: moderate  Diagnostic procedures: low  Management options: low    Patient Progress  Patient progress: improved         Procedures

## 2021-11-19 NOTE — ANESTHESIA PROCEDURE NOTES
Peripheral Block    Start time: 11/19/2021 6:48 PM  End time: 11/19/2021 6:52 PM  Performed by: Sisi Fernandez MD  Authorized by: Sisi Fernandez MD       Pre-procedure:    Indications: at surgeon's request, post-op pain management and procedure for pain    Preanesthetic Checklist: patient identified, risks and benefits discussed, site marked, timeout performed, anesthesia consent given and patient being monitored    Timeout Time: 18:47 EST          Block Type:   Block Type:  Supraclavicular  Laterality:  Left  Monitoring:  Standard ASA monitoring, responsive to questions, continuous pulse ox, oxygen, frequent vital sign checks and heart rate  Injection Technique:  Single shot  Procedures: ultrasound guided    Patient Position: seated  Prep: chlorhexidine    Location:  Supraclavicular  Needle Type:  Stimuplex  Needle Gauge:  22 G  Needle Localization:  Ultrasound guidance  Medication Injected:  Ropivacaine (PF) (NAROPIN)(0.5%) 5 mg/mL injection, 30 mL  Med Admin Time: 11/19/2021 6:54 PM    Assessment:  Number of attempts:  1  Injection Assessment:  Incremental injection every 5 mL, negative aspiration for CSF, no paresthesia, ultrasound image on chart, no intravascular symptoms, negative aspiration for blood and local visualized surrounding nerve on ultrasound  Patient tolerance:  Patient tolerated the procedure well with no immediate complications

## 2021-11-19 NOTE — H&P
Patient ID:  Name: Hattie Snyder  MRN: [id number]  AGE: 68 y.o.  : 1945     Date of Consultation:  2021  Referring Physician:  Emergency Dept      Subjective: Pt complains of left wrist and elbow pain after sustaining a fall down 5 stairs today. They presented to the [address] [place] [address] [address] Emergency Dept this afternon. They were found to have an open left distal radius and ulna fracture and a closed dislocation of the left elbow. The patient was placed in a soft cervical collar by EMS and is currently awaiting head and neck CT. They localizes their pain to the left wrist and elbow. They have pain with movement. The patient denies any hip pain or low back pain.   They have no other orthopedic concerns at this time.       Past Medical History Includes:        Past Medical History:   Diagnosis Date    Allergic rhinitis      Chronic pain       left hip pain- a lot better    Depression      Deviated septum      Disorder of bone and cartilage, unspecified 3/9/2015    Generalized anxiety disorder      GERD (gastroesophageal reflux disease)       on meication for this    History of hand fracture     Intestinal disaccharidase deficiencies and disaccharide malabsorption 9236    Lichen planus       oral    Mixed hyperlipidemia 3/9/2015    Nonspecific elevation of levels of transaminase or lactic acid dehydrogenase (LDH) 3/9/2015    Osteoarthritis of multiple joints 3/15/2016    Osteoporosis 2015    Pain in joint, lower leg 3/9/2015    Plantar fascial fibromatosis      Prediabetes 2016     no current meds, pt doesnt monitor BS    SNHL (sensorineural hearing loss)      Sudden hearing loss of both ears       HA to nicole ears    Tinnitus of both ears     ,         Past Surgical History:   Procedure Laterality Date    HX COLONOSCOPY        HX HYSTERECTOMY         hysterectomy about 30 years ago\"    HX ORTHOPAEDIC Right       wrist surgery    HX TONSILLECTOMY         tonsillectomy at age 21 or 18\"      Family History:         Family History   Problem Relation Age of Onset    Breast Cancer Sister 61    Cancer Sister      Heart Disease Mother      Colon Cancer Mother      Hypertension Mother      Diabetes Mother      Parkinsonism Father      Heart Disease Brother      Cancer Sister           uterus    Cancer Other           Uterine cancer (First degree Relatives)    Other Sister           Nodule removed in stomach and \"bled out\"      Social History:   Social History            Tobacco Use    Smoking status: Never Smoker    Smokeless tobacco: Never Used   Substance Use Topics    Alcohol use: No       Alcohol/week: 0.0 standard drinks         ALLERGIES:        Allergies   Allergen Reactions    Amoxicillin Diarrhea    Demerol [Meperidine] Nausea and Vomiting         Patient Medications           Current Facility-Administered Medications   Medication Dose Route Frequency    propofoL (DIPRIVAN) 10 mg/mL injection 200 mg  200 mg IntraVENous NOW    sodium chloride 0.9 % bolus infusion 1,000 mL  1,000 mL IntraVENous ONCE    ceFAZolin (ANCEF) 2 g/20 mL in sterile water IV syringe  2 g IntraVENous NOW    famotidine (PF) (PEPCID) 20 mg in 0.9% sodium chloride 10 mL injection  20 mg IntraVENous NOW    metoclopramide HCl (REGLAN) injection 10 mg  10 mg IntraVENous NOW    diph,Pertuss(AC),Tet Vac-PF (BOOSTRIX) suspension 0.5 mL  0.5 mL IntraMUSCular PRIOR TO DISCHARGE           Current Outpatient Medications   Medication Sig    alendronate (FOSAMAX) 70 mg tablet Take 1 Tablet by mouth every seven (7) days. Indications: decreased bone mass following menopause    acetaminophen (TYLENOL EXTRA STRENGTH PO) Take  by mouth two (2) times a day.  ALPRAZolam (Xanax) 0.5 mg tablet Take 1 Tablet by mouth nightly as needed for Anxiety. Max Daily Amount: 0.5 mg. Indications: anxious    pantoprazole (PROTONIX) 40 mg tablet Take 1 Tablet by mouth daily.     meloxicam (Mobic) 15 mg tablet Take 1 Tab by mouth nightly.  pravastatin (PRAVACHOL) 40 mg tablet Take 1 Tab by mouth nightly.  citalopram (CELEXA) 20 mg tablet Take 1 Tab by mouth daily. Indications: major depressive disorder    fluticasone propionate (FLONASE) 50 mcg/actuation nasal spray 2 Sprays by Both Nostrils route daily. Indications: inflammation of the nose due to an allergy    dexAMETHasone (Decadron) 0.5 mg/5 mL elixir Take  by mouth nightly.  cholecalciferol, vitamin D3, (VITAMIN D3 PO) Take  by mouth daily.  clotrimazole (LOTRIMIN) 1 % topical cream Apply  to affected area two (2) times daily as needed.  CALCIUM CARBONATE/VITAMIN D3 (CALCIUM 600 + D,3, PO) Take 1 Tab by mouth daily.  SENNOSIDES (SENNA LAX PO) Take 1 Tab by mouth daily.  MULTIVITAMIN PO Take 1 Tab by mouth daily.            Review of Systems:  A comprehensive review of systems was negative except for that written in the HPI.     Physical Exam:      General: NAD, Alert, Oriented x 3   Mental Status: Appropriate   Psych: Normal Affect, Normal Mood    HEENT: Normal Cephalic/Atraumatic, PERRL   Lungs: Respirations even and unlabored, Breath Sounds were clear, no respiratory distress   Heart: Regular Rate and Rhythm   Vascular: Distal pulses intact, good capillary refill   Skin: small open wound to the lateral aspect of the left wrist  Musculoskeletal: neck in soft cervical collar. The patient is able to flex and extend all 5 digits on the left hand. Sensation is intact in the left hand. Unable to flex or extend the left elbow. No pain on palpation of either knee. She is NV intact in both lower extremities. On exam of the the left ankle. There is no pain on palpation of either malleolus or calcaneus. There is tenderness of the achilles and palpable deformity of the achilles. There is a two fingerbreadth defect of the achilles tendon of the left foot.  The patient is able to dorsiflex and plantar flex the left foot with weakness and some spasticity. On exam of both hips there is no pain on IR/ER ROM with either hip. No pain on hip flexion. No pain ROM of either knee. Both knee are globally stable. Lymphatic: No lympahdenopathy, No distal edema   Neuro: No gross deficits   Abdomen: Soft, Non tender, No distension        VITALS:   Patient Vitals for the past 8 hrs:    BP Temp Pulse Resp SpO2 Height Weight   21 1532 (!) 149/73    99 %     21 1333 (!) 149/72 97.5 °F (36.4 °C) 69 20 94 % 5' (1.524 m) 160 lb (72.6 kg)    , Temp (24hrs), Av.5 °F (36.4 °C), Min:97.5 °F (36.4 °C), Max:97.5 °F (36.4 °C)               Diagnosis        Patient Active Problem List   Diagnosis Code    Pain in joint, lower leg M25.569    Mixed hyperlipidemia E78.2    Disorder of bone and cartilage, unspecified M89.9, M94.9    Cough R05.9    GERD (gastroesophageal reflux disease) K21.9    Depression F32. 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 The patient was discussed with Dr. Shilpa Ribeiro who has seen and examined the patient. The patient has two distinct injuries that require orthopedic intervention. Dr. Shilpa Ribeiro discussed the surgical plan and risks with the patient. We will plan for irrigation and exploration of the wound on the left wrist and will reduce and place an external fixator.  We will also perform a closed reduction of the left elbow.The patient has a history of osteoporosis and takes fosamax for this. She does also have a injury to the left achilles tendon that may represent a partial tendon rupture.  Will place the left foot in a walker boot.                        Electronically signed by Milo Miller at 11/19/21 1612   Electronically signed by Milo Miller at 11/19/21 1654     Electronically signed by Milo Miller at 11/19/21 1612   Electronically signed by Milo Miller at 11/19/21 [phone]

## 2021-11-19 NOTE — CONSULTS
[facility]  Consultation Note    Patient ID:  Name: Ranjeet Mitchell  MRN: [id number]  AGE: 68 y.o.  : 1945    Date of Consultation:  2021  Referring Physician:  Emergency Dept     Subjective: Pt complains of left wrist and elbow pain after sustaining a fall down 5 stairs today. They presented to the [facility] Emergency Dept this afternon. They were found to have an open left distal radius and ulna fracture and a closed dislocation of the left elbow. The patient was placed in a soft cervical collar by EMS and is currently awaiting head and neck CT. They localizes their pain to the left wrist and elbow. They have pain with movement. The patient denies any hip pain or low back pain. They have no other orthopedic concerns at this time.       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    Lichen planus     oral    Mixed hyperlipidemia 3/9/2015    Nonspecific elevation of levels of transaminase or lactic acid dehydrogenase (LDH) 3/9/2015    Osteoarthritis of multiple joints 3/15/2016    Osteoporosis 2015    Pain in joint, lower leg 3/9/2015    Plantar fascial fibromatosis     Prediabetes 2016    no current meds, pt doesnt monitor BS    SNHL (sensorineural hearing loss)     Sudden hearing loss of both ears     HA to nicole ears    Tinnitus of both ears    ,   Past Surgical History:   Procedure Laterality Date    HX COLONOSCOPY      HX HYSTERECTOMY      hysterectomy about 30 years ago\"  HX ORTHOPAEDIC Right     wrist surgery    HX TONSILLECTOMY      tonsillectomy at age 21 or 18\"     Family History:   Family History   Problem Relation Age of Onset    Breast Cancer Sister 61    Cancer Sister     Heart Disease Mother     Colon Cancer Mother     Hypertension Mother     Diabetes Mother     Parkinsonism Father     Heart Disease Brother     Cancer Sister         uterus    Cancer Other         Uterine cancer (First degree Relatives)    Other Sister         Nodule removed in stomach and \"bled out\"      Social History:   Social History     Tobacco Use    Smoking status: Never Smoker    Smokeless tobacco: Never Used   Substance Use Topics    Alcohol use: No     Alcohol/week: 0.0 standard drinks       ALLERGIES:   Allergies   Allergen Reactions    Amoxicillin Diarrhea    Demerol [Meperidine] Nausea and Vomiting        Patient Medications    Current Facility-Administered Medications   Medication Dose Route Frequency    propofoL (DIPRIVAN) 10 mg/mL injection 200 mg  200 mg IntraVENous NOW    sodium chloride 0.9 % bolus infusion 1,000 mL  1,000 mL IntraVENous ONCE    ceFAZolin (ANCEF) 2 g/20 mL in sterile water IV syringe  2 g IntraVENous NOW    famotidine (PF) (PEPCID) 20 mg in 0.9% sodium chloride 10 mL injection  20 mg IntraVENous NOW    metoclopramide HCl (REGLAN) injection 10 mg  10 mg IntraVENous NOW    diph,Pertuss(AC),Tet Vac-PF (BOOSTRIX) suspension 0.5 mL  0.5 mL IntraMUSCular PRIOR TO DISCHARGE     Current Outpatient Medications   Medication Sig    alendronate (FOSAMAX) 70 mg tablet Take 1 Tablet by mouth every seven (7) days. Indications: decreased bone mass following menopause    acetaminophen (TYLENOL EXTRA STRENGTH PO) Take  by mouth two (2) times a day.  ALPRAZolam (Xanax) 0.5 mg tablet Take 1 Tablet by mouth nightly as needed for Anxiety. Max Daily Amount: 0.5 mg. Indications: anxious    pantoprazole (PROTONIX) 40 mg tablet Take 1 Tablet by mouth daily.     meloxicam (Mobic) 15 mg tablet Take 1 Tab by mouth nightly.  pravastatin (PRAVACHOL) 40 mg tablet Take 1 Tab by mouth nightly.  citalopram (CELEXA) 20 mg tablet Take 1 Tab by mouth daily. Indications: major depressive disorder    fluticasone propionate (FLONASE) 50 mcg/actuation nasal spray 2 Sprays by Both Nostrils route daily. Indications: inflammation of the nose due to an allergy    dexAMETHasone (Decadron) 0.5 mg/5 mL elixir Take  by mouth nightly.  cholecalciferol, vitamin D3, (VITAMIN D3 PO) Take  by mouth daily.  clotrimazole (LOTRIMIN) 1 % topical cream Apply  to affected area two (2) times daily as needed.  CALCIUM CARBONATE/VITAMIN D3 (CALCIUM 600 + D,3, PO) Take 1 Tab by mouth daily.  SENNOSIDES (SENNA LAX PO) Take 1 Tab by mouth daily.  MULTIVITAMIN PO Take 1 Tab by mouth daily. Review of Systems:  A comprehensive review of systems was negative except for that written in the HPI. Physical Exam:      General: NAD, Alert, Oriented x 3   Mental Status: Appropriate   Psych: Normal Affect, Normal Mood    HEENT: Normal Cephalic/Atraumatic, PERRL   Lungs: Respirations even and unlabored, Breath Sounds were clear, no respiratory distress   Heart: Regular Rate and Rhythm   Vascular: Distal pulses intact, good capillary refill   Skin: small open wound to the lateral aspect of the left wrist  Musculoskeletal: neck in soft cervical collar. The patient is able to flex and extend all 5 digits on the left hand. Sensation is intact in the left hand. Unable to flex or extend the left elbow. No pain on palpation of either knee. She is NV intact in both lower extremities. On exam of the the left ankle. There is no pain on palpation of either malleolus or calcaneus. There is tenderness of the achilles and palpable deformity of the achilles. There is a two fingerbreadth defect of the achilles tendon of the left foot.  The patient is able to dorsiflex and plantar flex the left foot with weakness and some spasticity. On exam of both hips there is no pain on IR/ER ROM with either hip. No pain on hip flexion. No pain ROM of either knee. Both knee are globally stable. Lymphatic: No lympahdenopathy, No distal edema   Neuro: No gross deficits   Abdomen: Soft, Non tender, No distension      VITALS:   Patient Vitals for the past 8 hrs:   BP Temp Pulse Resp SpO2 Height Weight   21 1532 (!) 149/73    99 %     21 1333 (!) 149/72 97.5 °F (36.4 °C) 69 20 94 % 5' (1.524 m) 160 lb (72.6 kg)    , Temp (24hrs), Av.5 °F (36.4 °C), Min:97.5 °F (36.4 °C), Max:97.5 °F (36.4 °C)           Diagnosis   Patient Active Problem List   Diagnosis Code    Pain in joint, lower leg M25.569    Mixed hyperlipidemia E78.2    Disorder of bone and cartilage, unspecified M89.9, M94.9    Cough R05.9    GERD (gastroesophageal reflux disease) K21.9    Depression F32. 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 The patient was discussed with Dr. Gillermina Councilman who has seen and examined the patient. The patient has two distinct injuries that require orthopedic intervention. Dr. Gillermina Councilman discussed the surgical plan and risks with the patient. We will plan for irrigation and exploration of the wound on the left wrist and will reduce and place an external fixator.  We will also perform a closed reduction of the left elbow.The patient has a history of osteoporosis and takes fosamax for this. She does also have a injury to the left achilles tendon that may represent a partial tendon rupture. Will place the left foot in a walker boot.            ANJALI Tucker  11/19/2021,  3:51 PM

## 2021-11-20 ENCOUNTER — APPOINTMENT (OUTPATIENT)
Dept: CT IMAGING | Age: 76
End: 2021-11-20
Attending: ORTHOPAEDIC SURGERY
Payer: COMMERCIAL

## 2021-11-20 LAB
ATRIAL RATE: 78 BPM
CALCULATED P AXIS, ECG09: 50 DEGREES
CALCULATED R AXIS, ECG10: 22 DEGREES
CALCULATED T AXIS, ECG11: 56 DEGREES
DIAGNOSIS, 93000: NORMAL
P-R INTERVAL, ECG05: 152 MS
Q-T INTERVAL, ECG07: 500 MS
QRS DURATION, ECG06: 82 MS
QTC CALCULATION (BEZET), ECG08: 570 MS
VENTRICULAR RATE, ECG03: 78 BPM

## 2021-11-20 PROCEDURE — 74011250637 HC RX REV CODE- 250/637: Performed by: ORTHOPAEDIC SURGERY

## 2021-11-20 PROCEDURE — 73200 CT UPPER EXTREMITY W/O DYE: CPT

## 2021-11-20 PROCEDURE — G0378 HOSPITAL OBSERVATION PER HR: HCPCS

## 2021-11-20 PROCEDURE — 96372 THER/PROPH/DIAG INJ SC/IM: CPT

## 2021-11-20 PROCEDURE — 74011250636 HC RX REV CODE- 250/636: Performed by: NURSE PRACTITIONER

## 2021-11-20 PROCEDURE — 74011250636 HC RX REV CODE- 250/636: Performed by: ORTHOPAEDIC SURGERY

## 2021-11-20 PROCEDURE — 74011000258 HC RX REV CODE- 258: Performed by: ORTHOPAEDIC SURGERY

## 2021-11-20 PROCEDURE — 96376 TX/PRO/DX INJ SAME DRUG ADON: CPT

## 2021-11-20 PROCEDURE — 2709999900 HC NON-CHARGEABLE SUPPLY

## 2021-11-20 RX ORDER — HYDROMORPHONE HYDROCHLORIDE 1 MG/ML
1 INJECTION, SOLUTION INTRAMUSCULAR; INTRAVENOUS; SUBCUTANEOUS
Status: DISCONTINUED | OUTPATIENT
Start: 2021-11-20 | End: 2021-11-25 | Stop reason: HOSPADM

## 2021-11-20 RX ADMIN — ONDANSETRON 4 MG: 2 INJECTION INTRAMUSCULAR; INTRAVENOUS at 12:34

## 2021-11-20 RX ADMIN — Medication 10 ML: at 05:33

## 2021-11-20 RX ADMIN — PANTOPRAZOLE SODIUM 40 MG: 40 TABLET, DELAYED RELEASE ORAL at 06:30

## 2021-11-20 RX ADMIN — OXYCODONE HYDROCHLORIDE 10 MG: 5 TABLET ORAL at 07:49

## 2021-11-20 RX ADMIN — ACETAMINOPHEN 650 MG: 325 TABLET ORAL at 21:55

## 2021-11-20 RX ADMIN — OXYCODONE HYDROCHLORIDE 10 MG: 5 TABLET ORAL at 21:55

## 2021-11-20 RX ADMIN — ACETAMINOPHEN 650 MG: 325 TABLET ORAL at 13:33

## 2021-11-20 RX ADMIN — CEFAZOLIN SODIUM 1 G: 1 INJECTION, POWDER, FOR SOLUTION INTRAMUSCULAR; INTRAVENOUS at 20:20

## 2021-11-20 RX ADMIN — Medication 10 ML: at 13:06

## 2021-11-20 RX ADMIN — Medication 10 ML: at 20:17

## 2021-11-20 RX ADMIN — HYDROMORPHONE HYDROCHLORIDE 0.5 MG: 1 INJECTION, SOLUTION INTRAMUSCULAR; INTRAVENOUS; SUBCUTANEOUS at 12:34

## 2021-11-20 RX ADMIN — HYDROMORPHONE HYDROCHLORIDE 1 MG: 1 INJECTION, SOLUTION INTRAMUSCULAR; INTRAVENOUS; SUBCUTANEOUS at 20:07

## 2021-11-20 RX ADMIN — ONDANSETRON 4 MG: 2 INJECTION INTRAMUSCULAR; INTRAVENOUS at 20:07

## 2021-11-20 RX ADMIN — OXYCODONE HYDROCHLORIDE 10 MG: 5 TABLET ORAL at 13:33

## 2021-11-20 RX ADMIN — CITALOPRAM HYDROBROMIDE 20 MG: 20 TABLET ORAL at 08:02

## 2021-11-20 RX ADMIN — PRAVASTATIN SODIUM 40 MG: 20 TABLET ORAL at 21:55

## 2021-11-20 RX ADMIN — ENOXAPARIN SODIUM 40 MG: 100 INJECTION SUBCUTANEOUS at 08:02

## 2021-11-20 RX ADMIN — OXYCODONE HYDROCHLORIDE 10 MG: 5 TABLET ORAL at 17:14

## 2021-11-20 RX ADMIN — CEFAZOLIN SODIUM 1 G: 1 INJECTION, POWDER, FOR SOLUTION INTRAMUSCULAR; INTRAVENOUS at 03:18

## 2021-11-20 RX ADMIN — CEFAZOLIN SODIUM 1 G: 1 INJECTION, POWDER, FOR SOLUTION INTRAMUSCULAR; INTRAVENOUS at 11:28

## 2021-11-20 RX ADMIN — OXYCODONE HYDROCHLORIDE 10 MG: 5 TABLET ORAL at 10:48

## 2021-11-20 NOTE — PERIOP NOTES
Pt returned to pacu from xray. Speaking with  prior to transfer to [phone]. Pt has one hearing aid in her ear and one in a pink denture cup located in her lap.

## 2021-11-20 NOTE — OP NOTES
Operative Report    Patient: Girma Gibbons MRN: [id number]  SSN: [id number]    YOB: 1945  Age: 68 y.o. Sex: female       Date of Surgery: 11/19/2021     History:  Girma Gibbons is a 68 y.o. female who fell down some steps and was seen in the emergency room and found to have several different injuries. On exam she appears to have a left Achilles tendon rupture. We placed her in a boot for this. She also appear to have a type I open left distal radius and ulna fractures with the ulnar side being open and this was very comminuted and she also had a left posterior elbow dislocation. So since she did have an open fracture we thought it was reasonable just to go ahead and wait we took her to the operating room for debridement of her open fractures or placement of external fixator and then to just reduce her left elbow and place her in a splint. I talked to her and her  regarding this they seemed understand and wished to proceed. I talked to the patient and/or their representative and explained the exact nature the procedure. I also went through a detailed list of the material risks associated with  the procedure which included risk of bleeding, infection, injury to nearby structures, worsening the situation, as well as the risks associate with anesthesia and finally death. Also talked with him regarding the benefits and alternatives to the procedure.     Preoperative Diagnosis: LEFT ARM FX     Postoperative Diagnosis: Type I open displaced comminuted left distal radius and ulna fractures, left posterior elbow dislocation, left lower extremity Achilles tendon rupture    Surgeon(s) and Role:     * Alexandra Mccracken MD - Primary    Anesthesia: General     Procedure: Procedure(s):  #1debridement of open left distal ulna fracture #2percutaneous pin fixation of left distal radius and ulna fractures #3application of uniplanar external fixator left wrist #4closed treatment with manipulation left posterior elbow dislocation    Procedure in Detail: After successful induction of a regional anesthetic in the left upper extremity the patient was placed on the operating room bed. At that point I then reduced the posterior elbow dislocation and we confirmed this with the C arm image intensifier. We then proceeded prepped and draped the left upper extremity. I went ahead and placed the pins for the external fixator to in the index metacarpal and 2 in the radial shaft and after the pins were in place I then turned my attention to the lateral side of the wrist which was the ulnar side of the left wrist and I then opened the area in question by extending the incision proximally and distally did appear to be a type I open fracture at this led me to the intact ulnar shaft fracture I used a curette to physically debride this as I did this obviously small amounts of tissue were excised I then irrigate with a copious amount of normal saline I then fashioned an external fixator over the wrist holding traction and then tightened the external fixator. Once this was done I then added additional K wires with 3 wires being placed in the distal radius to from the radial styloid into the radial shaft and one from the ulnar aspect of the radius and then a single K wire down the shaft of the ulna going from distal down the shaft of the ulna. Once all these K wires were in place it appeared we had a reasonable reduction. I then closed my open wound with a running 2-0 nylon suture and then cut and bent all of my K wires. I placed a dressing around the left wrist and external fixator and then placed the patient in a posterior splint for the left elbow dislocation. Once the splint was in place we then placed her in a sling for the left upper extremity. She was also placed in a boot for the left Achilles tendon rupture.   She was then awakened taken to cover him in stable condition      Estimated Blood Loss: 60 cc    Tourniquet Time: * No tourniquets in log *      Implants: * No implants in log *            Specimens: * No specimens in log *        Drains: None                Complications: None    Counts: Sponge and needle counts were correct times two.     Signed By:  Mali Ruiz MD     November 19, 2021

## 2021-11-20 NOTE — PROGRESS NOTES
The plan will be admit the patient overnight. With her left elbow we will likely get a CT scan of the left elbow. If the CT scan shows a concentric reduction and no evidence of any fractures or subluxation of the left ulnohumeral or radiocapitellar joints and she can get up and move around satisfactorily with physical therapy and she could likely be discharged over the weekend. If she has any residual subluxation on the CT scan of her left elbow and or has difficulty getting up and around enough so that she can safely go home I think she would just likely be hospitalized through the weekend and I would likely address her left elbow on Monday.   As far as Achilles tendon rupture is concerned I think this will be treated nonoperatively we will likely place the patient in a cast short leg cast for this

## 2021-11-20 NOTE — PROGRESS NOTES
TRANSFER - IN REPORT:    Verbal report received from [address] on Michael Kothari  being received from PACU for routine post - op      Report consisted of patients Situation, Background, Assessment and   Recommendations(SBAR). Information from the following report(s) SBAR, OR Summary, Procedure Summary, Intake/Output, MAR and Cardiac Rhythm and  was reviewed with the receiving nurse. Opportunity for questions and clarification was provided. Assessment completed upon patients arrival to unit and care assumed.

## 2021-11-20 NOTE — ANESTHESIA POSTPROCEDURE EVALUATION
Procedure(s):  CLOSED REDUCTION LEFT ELBOW WITH APPLICATION EXTERNAL FIXATION LEFT WRIST.    total IV anesthesia    Anesthesia Post Evaluation      Multimodal analgesia: multimodal analgesia used between 6 hours prior to anesthesia start to PACU discharge  Patient location during evaluation: bedside  Patient participation: complete - patient participated  Level of consciousness: responsive to verbal stimuli  Pain management: adequate  Airway patency: patent  Anesthetic complications: no  Cardiovascular status: hemodynamically stable  Respiratory status: spontaneous ventilation  Hydration status: stable  Comments: AOx3, comfortable, monitoring and treating BP prior to sending to floor. Final Post Anesthesia Temperature Assessment:  Normothermia (36.0-37.5 degrees C)      INITIAL Post-op Vital signs:   Vitals Value Taken Time   /70 11/19/21 2102   Temp 36.5 °C (97.7 °F) 11/19/21 2059   Pulse 81 11/19/21 2103   Resp 14 11/19/21 2059   SpO2 93 % 11/19/21 2103   Vitals shown include unvalidated device data.

## 2021-11-20 NOTE — PERIOP NOTES
TRANSFER - OUT REPORT:    Verbal report given to 7th floor RN on Tonio Boyce  being transferred to [address] [place] [address] for routine post - op       Report consisted of patients Situation, Background, Assessment and   Recommendations(SBAR). Information from the following report(s) ED Summary, OR Summary, Procedure Summary, Intake/Output, MAR and Cardiac Rhythm SR was reviewed with the receiving nurse. Lines:   Peripheral IV 11/19/21 Anterior; Right Forearm (Active)   Site Assessment Clean, dry, & intact 11/19/21 2059   Phlebitis Assessment 0 11/19/21 2059   Infiltration Assessment 0 11/19/21 2059   Dressing Status Clean, dry, & intact 11/19/21 2059   Dressing Type Tape; Transparent 11/19/21 2059   Hub Color/Line Status Patent 11/19/21 2059   Alcohol Cap Used No 11/19/21 2059       Peripheral IV 11/19/21 Right Antecubital (Active)   Site Assessment Clean, dry, & intact 11/19/21 2059   Phlebitis Assessment 0 11/19/21 2059   Infiltration Assessment 0 11/19/21 2059   Dressing Status Clean, dry, & intact 11/19/21 2059   Dressing Type Tape; Transparent 11/19/21 2059   Hub Color/Line Status Patent 11/19/21 2059   Alcohol Cap Used No 11/19/21 2059        Opportunity for questions and clarification was provided. Patient transported with:   O2 @ 3lnc liters    VTE prophylaxis orders have been written for Tonio Boyce. Patient and family given floor number and nurses name. Family updated re: pt status after security code verified.

## 2021-11-21 PROCEDURE — 97162 PT EVAL MOD COMPLEX 30 MIN: CPT

## 2021-11-21 PROCEDURE — 74011250636 HC RX REV CODE- 250/636: Performed by: ORTHOPAEDIC SURGERY

## 2021-11-21 PROCEDURE — 97112 NEUROMUSCULAR REEDUCATION: CPT

## 2021-11-21 PROCEDURE — 74011250637 HC RX REV CODE- 250/637: Performed by: ORTHOPAEDIC SURGERY

## 2021-11-21 PROCEDURE — 97165 OT EVAL LOW COMPLEX 30 MIN: CPT

## 2021-11-21 PROCEDURE — 74011000258 HC RX REV CODE- 258: Performed by: ORTHOPAEDIC SURGERY

## 2021-11-21 PROCEDURE — G0378 HOSPITAL OBSERVATION PER HR: HCPCS

## 2021-11-21 PROCEDURE — 97530 THERAPEUTIC ACTIVITIES: CPT

## 2021-11-21 PROCEDURE — 96372 THER/PROPH/DIAG INJ SC/IM: CPT

## 2021-11-21 RX ADMIN — OXYCODONE HYDROCHLORIDE 10 MG: 5 TABLET ORAL at 12:33

## 2021-11-21 RX ADMIN — ENOXAPARIN SODIUM 40 MG: 100 INJECTION SUBCUTANEOUS at 09:12

## 2021-11-21 RX ADMIN — PRAVASTATIN SODIUM 40 MG: 20 TABLET ORAL at 21:05

## 2021-11-21 RX ADMIN — Medication 10 ML: at 13:12

## 2021-11-21 RX ADMIN — OXYCODONE HYDROCHLORIDE 10 MG: 5 TABLET ORAL at 17:53

## 2021-11-21 RX ADMIN — OXYCODONE HYDROCHLORIDE 10 MG: 5 TABLET ORAL at 09:12

## 2021-11-21 RX ADMIN — CEFAZOLIN SODIUM 1 G: 1 INJECTION, POWDER, FOR SOLUTION INTRAMUSCULAR; INTRAVENOUS at 03:37

## 2021-11-21 RX ADMIN — PANTOPRAZOLE SODIUM 40 MG: 40 TABLET, DELAYED RELEASE ORAL at 09:11

## 2021-11-21 RX ADMIN — ACETAMINOPHEN 650 MG: 325 TABLET ORAL at 03:55

## 2021-11-21 RX ADMIN — CITALOPRAM HYDROBROMIDE 20 MG: 20 TABLET ORAL at 09:11

## 2021-11-21 RX ADMIN — CEFAZOLIN SODIUM 1 G: 1 INJECTION, POWDER, FOR SOLUTION INTRAMUSCULAR; INTRAVENOUS at 21:05

## 2021-11-21 RX ADMIN — Medication 10 ML: at 05:13

## 2021-11-21 RX ADMIN — ACETAMINOPHEN 650 MG: 325 TABLET ORAL at 09:15

## 2021-11-21 RX ADMIN — OXYCODONE HYDROCHLORIDE 10 MG: 5 TABLET ORAL at 21:08

## 2021-11-21 RX ADMIN — OXYCODONE HYDROCHLORIDE 10 MG: 5 TABLET ORAL at 03:37

## 2021-11-21 RX ADMIN — CEFAZOLIN SODIUM 1 G: 1 INJECTION, POWDER, FOR SOLUTION INTRAMUSCULAR; INTRAVENOUS at 12:31

## 2021-11-21 NOTE — PROGRESS NOTES
ACUTE PHYSICAL THERAPY GOALS:  (Developed with and agreed upon by patient and/or caregiver.)    (1.) Giselle Wei  will move from supine to sit and sit to supine  with INDEPENDENCE within 7 treatment day(s). (2.) Giselle Wei will transfer from bed to chair and chair to bed with MODIFIED INDEPENDENCE using the least restrictive device within 7 treatment day(s). (3.) Giselle Wei will perform bilateral lower extremity exercises x 20 min for HEP with INDEPENDENCE to improve strength, endurance, and functional mobility within 7 treatment day(s). PHYSICAL THERAPY ASSESSMENT: Initial Assessment, Daily Note and AM PT Treatment Day # 1     *NWB L UE AND L LE*      Giselle Wei is a 68 y.o. female   PRIMARY DIAGNOSIS: <principal problem not specified>  Open Colles' fracture of left radius [S52.532B]  Procedure(s) (LRB):  CLOSED REDUCTION LEFT ELBOW WITH APPLICATION EXTERNAL FIXATION LEFT WRIST (Left)  2 Days Post-Op  Reason for Referral:    ICD-10: Treatment Diagnosis: Pain in Left Wrist (M25.532)  Difficulty in walking, Not elsewhere classified (R26.2)  Pain in L ankle  OBSERVATION: Payor: Genna Hopkins / Plan: Real Martinez / Product Type: [employer] Medicare /     ASSESSMENT:     REHAB RECOMMENDATIONS:   Recommendation to date pending progress:  [address] [place] [address]  Equipment:    To Be Determined   wheelchair     PRIOR LEVEL OF FUNCTION:  (Prior to Hospitalization) INITIAL/CURRENT LEVEL OF FUNCTION:  (Most Recently Demonstrated)   Bed Mobility:   Independent  Sit to Stand:   Independent  Transfers:   Independent  Gait/Mobility:   Independent Bed Mobility:   Standby Assistance  Sit to Stand:   Minimal Assistance -modA  Transfers:   Minimal Assistance -modA  Gait/Mobility:   Not tested     ASSESSMENT:  Ms. Robyn Roper  is a 68year old F who presents s/p above procedures, POD#2.  She fell down the stairs and broke L distal radius/ulna and has L Coggon's tendon rupture. Per PT orders, she is NWB on L UE and L LE. At baseline, pt is independent and active. She endorses one other recent fall. This date pt performs mobility including bed mobility with SBA. Pt educated on WB status and squat pivot transfers. Practiced squat pivot transfer from bed>chair x 2 and chair>bed. She moves well but will benefit from [facility] for further rehab given that she is NWB on upper and lower extremities. Of note, pt also has 8 stairs to get into her house. Pt presents as functioning below her baseline, with deficits in mobility including transfers, gait, balance, and activity tolerance. Pt will benefit from skilled therapy services to address stated deficits to promote return to highest level of function, independence, and safety. Will continue to follow. SUBJECTIVE:   Ms. Tricia Shahid states, \"those stairs. \"    SOCIAL HISTORY/LIVING ENVIRONMENT: PLOF: ind, active, lives with spouse, 8 ROCIO, 2 falls  Home Environment: Private residence  # Steps to Enter: 8  One/Two Story Residence: One story  Living Alone: No  Support Systems: Other Family Member(s), Spouse/Significant Other  OBJECTIVE:     PAIN: VITAL SIGNS: LINES/DRAINS:   Pre Treatment: Pain Screen  Pain Scale 1: Numeric (0 - 10)  Pain Intensity 1: 5  Pain Onset 1: postop  Pain Location 1: Arm  Pain Orientation 1: Left  Pain Intervention(s) 1: Repositioned  Post Treatment: 5 Vital Signs  O2 Sat (%): 92 % (post mobility) Marmolejo Catheter  O2 Device: None (Room air)     GROSS EVALUATION:  B LE Within Functional Limits Abnormal/ Functional Abnormal/ Non-Functional (see comments) Not Tested Comments:   AROM [] [x] [] [] L LE limited (achilles rupture)   PROM [] [] [] [x]    Strength [] [x] [] [] L LE limited (achilles rupture)   Balance [] [x] [] [] Good sitting, fair- standing    Posture [] [x] [] [] Forward flexed   Sensation [] [] [] [x]    Coordination [] [] [] [x]    Tone [] [] [] [x]    Edema [] [] [] [x]    Activity Tolerance [] [x] [] [] Well below baseline    [] [] [] []      COGNITION/  PERCEPTION: Intact Impaired   (see comments) Comments:   Orientation [x] []    Vision [x] []    Hearing [x] []    Command Following [x] []    Safety Awareness [x] []     [] []      MOBILITY: I Mod I S SBA CGA Min Mod Max Total  NT x2 Comments:   Bed Mobility    Rolling [] [] [] [x] [] [] [] [] [] [] [] HOB elevated   Supine to Sit [] [] [] [x] [] [] [] [] [] [] [] HOB elevated   Scooting [] [] [] [x] [] [] [] [] [] [] []    Sit to Supine [] [] [] [] [] [] [] [] [] [x] []    Transfers    Sit to Stand [] [] [] [] [] [x] [x] [] [] [] []    Bed to Chair [] [] [] [] [] [x] [x] [] [] [] []    Stand to Sit [] [] [] [] [] [x] [x] [] [] [] []    I=Independent, Mod I=Modified Independent, S=Supervision, SBA=Standby Assistance, CGA=Contact Guard Assistance,   Min=Minimal Assistance, Mod=Moderate Assistance, Max=Maximal Assistance, Total=Total Assistance, NT=Not Tested  GAIT: I Mod I S SBA CGA Min Mod Max Total  NT x2 Comments:   Level of Assistance [] [] [] [] [] [] [] [] [] [x] []    Distance n/a    DME N/A    Gait Quality n/a    Weightbearing Status NWB, L UE and L LE     I=Independent, Mod I=Modified Independent, S=Supervision, SBA=Standby Assistance, CGA=Contact Guard Assistance,   Min=Minimal Assistance, Mod=Moderate Assistance, Max=Maximal Assistance, Total=Total Assistance, NT=Not Tested    [address] [facility] [address] [address] AM-PAC [address] [address] Mobility Inpatient Short Form       How much difficulty does the patient currently have. .. Unable A Lot A Little None   1. Turning over in bed (including adjusting bedclothes, sheets and blankets)? [] 1   [] 2   [x] 3   [] 4   2. Sitting down on and standing up from a chair with arms ( e.g., wheelchair, bedside commode, etc.)   [] 1   [x] 2   [] 3   [] 4   3. Moving from lying on back to sitting on the side of the bed?    [] 1   [] 2   [x] 3   [] 4   How much help from another person does the patient currently need... Total A Lot A Little None   4. Moving to and from a bed to a chair (including a wheelchair)? [] 1   [x] 2   [] 3   [] 4   5. Need to walk in hospital room? [] 1   [x] 2   [] 3   [] 4   6. Climbing 3-5 steps with a railing? [x] 1   [] 2   [] 3   [] 4   © 2007, Trustees of [address] [place] [address] [address] [address], under license to [employer]. All rights reserved     Score:  Initial: 13 Most Recent: X (Date: -- )    Interpretation of Tool:  Represents activities that are increasingly more difficult (i.e. Bed mobility, Transfers, Gait). PLAN:   FREQUENCY/DURATION: PT Plan of Care: Daily for duration of hospital stay or until stated goals are met, whichever comes first.    PROBLEM LIST:   (Skilled intervention is medically necessary to address:)  1. Decreased ADL/Functional Activities  2. Decreased Activity Tolerance  3. Decreased AROM/PROM  4. Decreased Balance  5. Decreased Gait Ability  6. Decreased Strength  7. Decreased Transfer Abilities  8. Increased Pain   INTERVENTIONS PLANNED:   (Benefits and precautions of physical therapy have been discussed with the patient.)  1. Therapeutic Activity  2. Therapeutic Exercise/HEP  3. Neuromuscular Re-education  4. Gait Training  5. Education     TREATMENT:     EVALUATION: Moderate Complexity : (Untimed Charge)    TREATMENT:   (     )  Co-Treatment PT/OT necessary due to patient's decreased overall endurance/tolerance levels, as well as need for high level skilled assistance to complete functional transfers/mobility and functional tasks  Therapeutic Activity (19 Minutes): Therapeutic activity included Rolling, Supine to Sit, Scooting, Lateral Scooting, Transfer Training and Sitting balance  to improve functional Mobility, Strength and Activity tolerance.     TREATMENT GRID:  N/A    AFTER TREATMENT POSITION/PRECAUTIONS:  Chair, Needs within reach, RN notified and Visitors at bedside    INTERDISCIPLINARY COLLABORATION:  RN/PCT and OT/MULTANI    TOTAL TREATMENT DURATION:  PT Patient Time In/Time Out  Time In: 0951  Time Out: [address],

## 2021-11-21 NOTE — PROGRESS NOTES
ACUTE OT GOALS:  (Developed with and agreed upon by patient and/or caregiver.)  1. Patient will verbalize/demonstrate NWB LUE/LLE precautions with 100% accuracy. 2. Patient will complete functional transfers with modified independence and adaptive device as needed. 3. Patient will bathe and dress total body with minimal assistance and adaptive device as needed. 4. Patient will toilet with set up and adaptive device as needed. 5. Patient will groom with set up and adaptive device as needed. Timeframe: 7 visits  NWB LLE, NWB LUE (to be left in boot)      OCCUPATIONAL THERAPY ASSESSMENT: Initial Assessment and Daily Note OT Treatment Day # 1    Marleny Langley is a 68 y.o. female   PRIMARY DIAGNOSIS: <principal problem not specified>  Open Colles' fracture of left radius [S52.532B]  Procedure(s) (LRB):  CLOSED REDUCTION LEFT ELBOW WITH APPLICATION EXTERNAL FIXATION LEFT WRIST (Left)  2 Days Post-Op  Reason for Referral:    ICD-10: Treatment Diagnosis: Pain in Left Wrist (M25.532)  Difficulty in walking, Not elsewhere classified (R26.2)  History of falling (Z91.81)  OBSERVATION: Payor: Katja Headings / Plan: Bedelia Bolls / Product Type: Managed Care Medicare /   ASSESSMENT:     REHAB RECOMMENDATIONS:   Recommendation to date pending progress:  [address] [place] [address] (Pt highly motivated with independent PLOF and supportive spouse for d/c.  Has 8 steps to enter home, is NWB LLE)  Equipment:    3 in 1 Bedside Commode   w/c      PRIOR LEVEL OF FUNCTION:  (Prior to Hospitalization)  INITIAL/CURRENT LEVEL OF FUNCTION:  (Based on today's evaluation)   Bathing:   Independent  Dressing:   Independent  Feeding/Grooming:   Independent  Toileting:   Independent  Functional Mobility:   Independent Bathing:   Minimal Assistance  Dressing:   Moderate Assistance  Feeding/Grooming:   Minimal Assistance  Toileting:   Moderate Assistance  Functional Mobility:   Minimal Assistance x 2     ASSESSMENT:  Ms. Nela Weeks presents after falling down stairs with L radial and ulnar fxs s/p external fixation, L elbow dislocation s/p closed reduction, and L Achilles tendon rupture. Pt NWB LUE/LLE. At baseline pt lives with spouse, is fully independent with I/ADLs, ambulation, driving. Has 8 steps to enter home. Pt with new NWB LUE/LLE status greatly impacting mobility and ADLs. Pt practiced bed mobility with MinAx2/cueing for technique/how to maintain NWB precautions. Sitting balance intact, practiced lower body dressing with Meredith to don R shoe. Pt educated on squat pivot transfers and NWB status, practiced with MinAx2, cues for technique, safety, balance, and weight shifting. Required 1L O2 NC. Pt is functioning far below baseline and would benefit from continued OT to increase safety and independence. Will need extensive ADL training. SUBJECTIVE:   Ms. Nela Weeks states, \"I'd like to go to rehab. \"    SOCIAL HISTORY/LIVING ENVIRONMENT: At baseline pt lives with spouse, is fully independent with I/ADLs, ambulation, driving. Has 8 steps to enter home. No supplemental O2 use.     Home Environment: Private residence  # Steps to Enter: 8  One/Two Story Residence: One story  Living Alone: No  Support Systems: Other Family Member(s), Spouse/Significant Other    OBJECTIVE:     PAIN: VITAL SIGNS: LINES/DRAINS:   Pre Treatment: Pain Screen  Pain Scale 1: Numeric (0 - 10)  Pain Intensity 1: 0  Post Treatment: same Vital Signs  O2 Sat (%): (!) 89 %  O2 Device: None (Room air) (replaced on 1L, SpO2 improves to 92%) Marmolejo Catheter  O2 Device: None (Room air) (replaced on 1L, SpO2 improves to 92%)     GROSS EVALUATION:  RUE Within Functional Limits Abnormal/ Functional Abnormal/ Non-Functional (see comments) Not Tested Comments: LUE immobilized in sling   AROM [x] [] [] []    PROM [] [] [] []    Strength [] [x] [] []    Balance [] [x] [] []    Posture [x] [] [] []    Sensation [] [] [] []    Coordination [x] [] [] []    Tone [x] [] [] []    Edema [x] [] [] []    Activity Tolerance [] [x] [] []     [] [] [] []      COGNITION/  PERCEPTION: Intact Impaired   (see comments) Comments:   Orientation [x] []    Vision [] []    Hearing [x] []    Judgment/ Insight [x] []    Attention [x] []    Memory [] []    Command Following [x] []    Emotional Regulation [x] []     [] []      ACTIVITIES OF DAILY LIVING: I Mod I S SBA CGA Min Mod Max Total NT Comments   BASIC ADLs:              Bathing/ Showering [] [] [] [] [] [] [] [] [] [x]    Toileting [] [] [] [] [] [] [] [] [] [x]    Dressing [] [] [] [] [] [x] [] [] [] [] [place] R slip on shoe-seated   Feeding [] [] [] [] [] [] [] [] [] [x]    Grooming [] [] [] [] [] [] [] [] [] [x]    Personal Device Care [] [] [] [] [] [] [] [] [x] [] Position LUE sling   Functional Mobility [] [] [] [] [] [x] [] [] [] [] x2   I=Independent, Mod I=Modified Independent, S=Supervision, SBA=Standby Assistance, CGA=Contact Guard Assistance,   Min=Minimal Assistance, Mod=Moderate Assistance, Max=Maximal Assistance, Total=Total Assistance, NT=Not Tested    MOBILITY: I Mod I S SBA CGA Min Mod Max Total  NT x2 Comments:   Supine to sit [] [] [] [] [] [x] [] [] [] [] [x]    Sit to supine [] [] [] [] [] [] [] [] [] [x] []    Sit to stand [] [] [] [] [x] [x] [] [] [] [] [x]    Bed to chair [] [] [] [] [x] [x] [] [] [] [] [x]    I=Independent, Mod I=Modified Independent, S=Supervision, SBA=Standby Assistance, CGA=Contact Guard Assistance,   Min=Minimal Assistance, Mod=Moderate Assistance, Max=Maximal Assistance, Total=Total Assistance, NT=Not Tested    [employer] AM-PAC 6 [address]   Daily Activity Inpatient Short Form        How much help from another person does the patient currently need. .. Total A Lot A Little None   1. Putting on and taking off regular lower body clothing? [] 1   [x] 2   [] 3   [] 4   2. Bathing (including washing, rinsing, drying)? [] 1   [x] 2   [] 3   [] 4   3.   Toileting, which includes using toilet, bedpan or urinal?   [] 1   [x] 2   [] 3   [] 4   4. Putting on and taking off regular upper body clothing? [] 1   [x] 2   [] 3   [] 4   5. Taking care of personal grooming such as brushing teeth? [] 1   [] 2   [x] 3   [] 4   6. Eating meals? [] 1   [] 2   [x] 3   [] 4   © 2007, Trustees of [address] [place] [address] [address] [address], under license to [employer]. All rights reserved     Score:  Initial: 14 11/21/21 Most Recent: X (Date: -- )   Interpretation of Tool:  Represents activities that are increasingly more difficult (i.e. Bed mobility, Transfers, Gait). PLAN:   FREQUENCY/DURATION: OT Plan of Care: 4 times/week for duration of hospital stay or until stated goals are met, whichever comes first.    PROBLEM LIST:   (Skilled intervention is medically necessary to address:)  1. Decreased ADL/Functional Activities  2. Decreased Activity Tolerance  3. Decreased AROM/PROM  4. Decreased Balance  5. Decreased Coordination  6. Decreased Gait Ability  7. Decreased Strength  8. Decreased Transfer Abilities  9. Increased Pain   INTERVENTIONS PLANNED:   (Benefits and precautions of occupational therapy have been discussed with the patient.)  1. Self Care Training  2. Therapeutic Activity  3. Therapeutic Exercise/HEP  4. Neuromuscular Re-education  5. Education     TREATMENT:     EVALUATION: Low Complexity : (Untimed Charge)    TREATMENT:   (     )  Co-Treatment PT/OT necessary due to patient's decreased overall endurance/tolerance levels, as well as need for high level skilled assistance to complete functional transfers/mobility and functional tasks  Neuromuscular Re-education (10 Minutes): Neuromuscular Re-education included Balance Training, Coordination training, Functional mobility with facilitation, Postural training, Sitting balance training and Standing balance training to improve Balance, Coordination, Functional Mobility and Postural Control.     TREATMENT GRID:  N/A    AFTER TREATMENT POSITION/PRECAUTIONS:  Chair, Needs within reach and Visitors at bedside    INTERDISCIPLINARY COLLABORATION:  RN/PCT, PT/PTA and OT/MULTANI    TOTAL TREATMENT DURATION:  OT Patient Time In/Time Out  Time In: 0950  Time Out: [address], OTR/L

## 2021-11-21 NOTE — PROGRESS NOTES
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 01/16/2017    SNHL (sensorineural hearing loss)     Allergic rhinitis     Prediabetes 06/22/2016    Osteoarthritis of multiple joints 03/15/2016    Osteoporosis 12/04/2015    Depression     GERD (gastroesophageal reflux disease)     Pain in joint, lower leg 03/09/2015    Mixed hyperlipidemia 03/09/2015    Disorder of bone and cartilage, unspecified 03/09/2015    Cough 03/09/2015       Procedure: 2 Days Post-Op Procedure(s):  CLOSED REDUCTION LEFT ELBOW WITH APPLICATION EXTERNAL FIXATION LEFT WRIST   Attending Surgeon: Bri Lee MD     Subjective:   Pain well controlled, no acute distress. Objective:   Afebrile, vital signs stable.    Visit Vitals  /75   Pulse 70   Temp 98.4 °F (36.9 °C)   Resp 18   Ht 5' (1.524 m)   Wt 160 lb (72.6 kg)   SpO2 98%   BMI 31.25 kg/m²        GENERAL:       EXTREMITIES:       Left Upper Extremity  Splint in place - clean, dry, intact   Sensation intact to light touch median, radial, ulnar nerve distribution   Intact AIN/PIN/ulnar nerve    Brisk capillary refill   Fingers well perfused           CBC:  No results found for: WBC, RBC, HGB, HCT, PLT, HGBEXT, HCTEXT, PLTEXT   BMP: No results found for: NA, K, CL, CO2, BUN, GLU   CMP: No results found for: NA, K, CL, CO2, BUN, GLU, ALBUMIN, AST, ALT     Assessment/Plan     68 y.o. female with the following musculoskeletal problems:     2 Days Post-Op Procedure(s):  CLOSED REDUCTION LEFT ELBOW WITH APPLICATION EXTERNAL FIXATION LEFT WRIST      - PT for DC planning  - NO further surgery necessary  - Can platform bear weight thorough the left elbow, not the wrist     Yefri Rivera MD  11/21/21  9:49 AM    [address] [facility] [facility]  Phone: [phone]

## 2021-11-22 PROBLEM — S86.002A ACHILLES TENDON INJURY, LEFT, INITIAL ENCOUNTER: Status: ACTIVE | Noted: 2021-11-22

## 2021-11-22 LAB
APPEARANCE UR: ABNORMAL
BACTERIA URNS QL MICRO: ABNORMAL /HPF
BILIRUB UR QL: NEGATIVE
COLOR UR: YELLOW
GLUCOSE UR STRIP.AUTO-MCNC: NEGATIVE MG/DL
HGB UR QL STRIP: ABNORMAL
KETONES UR QL STRIP.AUTO: NEGATIVE MG/DL
LEUKOCYTE ESTERASE UR QL STRIP.AUTO: ABNORMAL
NITRITE UR QL STRIP.AUTO: POSITIVE
OTHER OBSERVATIONS,UCOM: ABNORMAL
PH UR STRIP: 7.5 [PH] (ref 5–9)
PROT UR STRIP-MCNC: 30 MG/DL
RBC #/AREA URNS HPF: ABNORMAL /HPF
SP GR UR REFRACTOMETRY: 1.01 (ref 1–1.02)
UROBILINOGEN UR QL STRIP.AUTO: 1 EU/DL (ref 0.2–1)
WBC URNS QL MICRO: >100 /HPF
YEAST URNS QL MICRO: ABNORMAL

## 2021-11-22 PROCEDURE — 97530 THERAPEUTIC ACTIVITIES: CPT

## 2021-11-22 PROCEDURE — 96376 TX/PRO/DX INJ SAME DRUG ADON: CPT

## 2021-11-22 PROCEDURE — 74011250637 HC RX REV CODE- 250/637: Performed by: PHYSICIAN ASSISTANT

## 2021-11-22 PROCEDURE — 74011000250 HC RX REV CODE- 250: Performed by: ORTHOPAEDIC SURGERY

## 2021-11-22 PROCEDURE — 74011250636 HC RX REV CODE- 250/636: Performed by: NURSE PRACTITIONER

## 2021-11-22 PROCEDURE — 81001 URINALYSIS AUTO W/SCOPE: CPT

## 2021-11-22 PROCEDURE — G0378 HOSPITAL OBSERVATION PER HR: HCPCS

## 2021-11-22 PROCEDURE — 2709999900 HC NON-CHARGEABLE SUPPLY

## 2021-11-22 PROCEDURE — 97112 NEUROMUSCULAR REEDUCATION: CPT

## 2021-11-22 PROCEDURE — 74011250637 HC RX REV CODE- 250/637: Performed by: ORTHOPAEDIC SURGERY

## 2021-11-22 PROCEDURE — 97535 SELF CARE MNGMENT TRAINING: CPT

## 2021-11-22 PROCEDURE — 51798 US URINE CAPACITY MEASURE: CPT

## 2021-11-22 PROCEDURE — 86580 TB INTRADERMAL TEST: CPT | Performed by: ORTHOPAEDIC SURGERY

## 2021-11-22 RX ORDER — HYDROCODONE BITARTRATE AND ACETAMINOPHEN 10; 325 MG/1; MG/1
1 TABLET ORAL
Status: DISCONTINUED | OUTPATIENT
Start: 2021-11-22 | End: 2021-11-25 | Stop reason: HOSPADM

## 2021-11-22 RX ORDER — NITROFURANTOIN MACROCRYSTALS 50 MG/1
50 CAPSULE ORAL EVERY 6 HOURS
Status: DISCONTINUED | OUTPATIENT
Start: 2021-11-23 | End: 2021-11-25 | Stop reason: HOSPADM

## 2021-11-22 RX ADMIN — OXYCODONE HYDROCHLORIDE 10 MG: 5 TABLET ORAL at 08:59

## 2021-11-22 RX ADMIN — ACETAMINOPHEN 650 MG: 325 TABLET ORAL at 11:14

## 2021-11-22 RX ADMIN — OXYCODONE HYDROCHLORIDE 10 MG: 5 TABLET ORAL at 04:19

## 2021-11-22 RX ADMIN — CITALOPRAM HYDROBROMIDE 20 MG: 20 TABLET ORAL at 08:59

## 2021-11-22 RX ADMIN — PANTOPRAZOLE SODIUM 40 MG: 40 TABLET, DELAYED RELEASE ORAL at 08:59

## 2021-11-22 RX ADMIN — POLYETHYLENE GLYCOL 3350 17 G: 17 POWDER, FOR SOLUTION ORAL at 11:15

## 2021-11-22 RX ADMIN — Medication 10 ML: at 14:49

## 2021-11-22 RX ADMIN — Medication 10 ML: at 21:59

## 2021-11-22 RX ADMIN — HYDROMORPHONE HYDROCHLORIDE 1 MG: 1 INJECTION, SOLUTION INTRAMUSCULAR; INTRAVENOUS; SUBCUTANEOUS at 14:49

## 2021-11-22 RX ADMIN — HYDROCODONE BITARTRATE AND ACETAMINOPHEN 1 TABLET: 10; 325 TABLET ORAL at 21:59

## 2021-11-22 RX ADMIN — PRAVASTATIN SODIUM 40 MG: 20 TABLET ORAL at 21:59

## 2021-11-22 RX ADMIN — TUBERCULIN PURIFIED PROTEIN DERIVATIVE 5 UNITS: 5 INJECTION, SOLUTION INTRADERMAL at 15:48

## 2021-11-22 NOTE — PROGRESS NOTES
MRI cannot be done with external fixators on wrist. The fixators can not be near or inside the bore of the magnet. Wrist will be too close to be safe. Other possibilities could be fixator being attracted to the magnet and causing harm to pts wrist or elbow. Other restrictions indicate specific coil that we can or can not use. The coil we would use is not allowed.  Any questions call [address] Officer

## 2021-11-22 NOTE — PROGRESS NOTES
ACUTE PHYSICAL THERAPY GOALS:  (Developed with and agreed upon by patient and/or caregiver.)  (1.) Darell Razo  will move from supine to sit and sit to supine  with INDEPENDENCE within 7 treatment day(s). (2.) Darell Razo will transfer from bed to chair and chair to bed with MODIFIED INDEPENDENCE using the least restrictive device within 7 treatment day(s). (3.) Darell Razo will perform bilateral lower extremity exercises x 20 min for HEP with INDEPENDENCE to improve strength, endurance, and functional mobility within 7 treatment day(s). PHYSICAL THERAPY: Daily Note and AM Treatment Day # 2    Darell Razo is a 68 y.o. female   PRIMARY DIAGNOSIS: <principal problem not specified>  Open Colles' fracture of left radius [S52.532B]  Procedure(s) (LRB):  CLOSED REDUCTION LEFT ELBOW WITH APPLICATION EXTERNAL FIXATION LEFT WRIST (Left)  3 Days Post-Op    ASSESSMENT:     REHAB RECOMMENDATIONS: CURRENT LEVEL OF FUNCTION:  (Most Recently Demonstrated)   Recommendation to date pending progress:  Setting:   Short-term Rehab  Equipment:    To Be Determined Bed Mobility:   Supervision  Sit to Stand:  [employer] Assistance  Transfers:  [employer] Guard Assistance  Gait/Mobility:   Not tested     ASSESSMENT:  Ms. Ranjit Rivera was supine upon contact and agreeable to PT. Patient performs supine to sit with supervision where she demonstrates intact sitting balance. Patient transfers to and from [facility] with CGA and good ability to maintain NWB status on LLE/LUE. Per Dr. Isabel Weaver note patient may weight bear through elbow on platform walker. Patient then transfers to recliner chair with CGA. Overall patient is moving great but does have 8 stairs to navigate into her home. Patient at this time would benefit from rehab prior to d/chome. Will try platform walker with patient tomorrow. Good progress. SUBJECTIVE:   Ms. Ranjit Rivera states, \"I don't know what is going on. \"    SOCIAL HISTORY/ LIVING ENVIRONMENT: See initial evaluation  Home Environment: Private residence  # Steps to Enter: 8  One/Two Story Residence: One story  Living Alone: No  Support Systems: Other Family Member(s), Spouse/Significant Other  OBJECTIVE:     PAIN: VITAL SIGNS: LINES/DRAINS:   Pre Treatment: Pain Screen  Pain Scale 1: FLACC  Pain Intensity 1: 0  Post Treatment: 0   None  O2 Device: None (Room air) (replaced on 1L, SpO2 improves to 92%)     MOBILITY: I Mod I S SBA CGA Min Mod Max Total  NT x2 Comments:   Bed Mobility    Rolling [] [] [] [] [] [] [] [] [] [] []    Supine to Sit [] [] [x] [] [] [] [] [] [] [] []    Scooting [] [] [] [] [] [] [] [] [] [] []    Sit to Supine [] [] [] [] [] [] [] [] [] [] []    Transfers    Sit to Stand [] [] [] [] [x] [] [] [] [] [] []    Bed to Chair [] [] [] [] [x] [] [] [] [] [] []    Stand to Sit [] [] [] [] [x] [] [] [] [] [] []    I=Independent, Mod I=Modified Independent, S=Supervision, SBA=Standby Assistance, CGA=Contact Guard Assistance,   Min=Minimal Assistance, Mod=Moderate Assistance, Max=Maximal Assistance, Total=Total Assistance, NT=Not Tested    BALANCE: Good Fair+ Fair Fair- Poor NT Comments   Sitting Static [x] [] [] [] [] []    Sitting Dynamic [x] [] [] [] [] []              Standing Static [x] [] [] [] [] []    Standing Dynamic [] [x] [] [] [] []      GAIT: I Mod I S SBA CGA Min Mod Max Total  NT x2 Comments:   Level of Assistance [] [] [] [] [] [] [] [] [] [x] []    Distance     DME N/A    Gait Quality     Weightbearing  Status N/A     I=Independent, Mod I=Modified Independent, S=Supervision, SBA=Standby Assistance, CGA=Contact Guard Assistance,   Min=Minimal Assistance, Mod=Moderate Assistance, Max=Maximal Assistance, Total=Total Assistance, NT=Not Tested    PLAN:   FREQUENCY/DURATION: PT Plan of Care: Daily for duration of hospital stay or until stated goals are met, whichever comes first.  TREATMENT:     TREATMENT:   (     )  Co-Treatment PT/OT necessary due to patient's decreased overall endurance/tolerance levels, as well as need for high level skilled assistance to complete functional transfers/mobility and functional tasks  Therapeutic Activity (27 Minutes): Therapeutic activity included Supine to Sit, Scooting, Lateral Scooting, Transfer Training, Sitting balance  and Standing balance to improve functional Mobility, Strength and Activity tolerance.     TREATMENT GRID:  N/A    AFTER TREATMENT POSITION/PRECAUTIONS:  Chair, Needs within reach, RN notified and Visitors at bedside    INTERDISCIPLINARY COLLABORATION:  RN/PCT, PT/PTA and OT/MULTANI    TOTAL TREATMENT DURATION:  PT Patient Time In/Time Out  Time In: 1037  Time Out: [address] [facility] PREM [place]

## 2021-11-22 NOTE — PROGRESS NOTES
2021         Post Op day: 3 Days Post-Op Procedure(s) (LRB):  CLOSED REDUCTION LEFT ELBOW WITH APPLICATION EXTERNAL FIXATION LEFT WRIST (Left)      Admit Date: 2021  Admit Diagnosis: Open Colles' fracture of left radius [S52.532B]       Principle Problem: <principal problem not specified>. Subjective: Doing well, No complaints, No SOB, No Chest Pain, No Nausea or Vomiting     Objective:   Vital Signs are Stable, No Acute Distress, Alert,  Dressing is Dry. , right upper extremity Neurovascular exam is normal.     Assessment / Plan :  Patient Active Problem List   Diagnosis Code    Pain in joint, lower leg M25.569    Mixed hyperlipidemia E78.2    Disorder of bone and cartilage, unspecified M89.9, M94.9    Cough R05.9    GERD (gastroesophageal reflux disease) K21.9    Depression F32. 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 Lab Results   Component Value Date/Time    HGB 14.3 2021 04:00 PM          S/P Procedure(s) (LRB):  CLOSED REDUCTION LEFT ELBOW WITH APPLICATION EXTERNAL FIXATION LEFT WRIST (Left)    Awaiting MRI of the left ankle.  Patient has MRI compatible external fixator   Fall Precautions  F/U: 2 weeks       Signed By: ANJALI Coelho  11/22/2021,  10:52 AM

## 2021-11-22 NOTE — PROGRESS NOTES
Pt has an MRI compatible  external fixator on the left wrist.  The fixator actually says MR compatible on the multipin clamps

## 2021-11-22 NOTE — PROGRESS NOTES
ACUTE OT GOALS:  (Developed with and agreed upon by patient and/or caregiver.)  1. Patient will verbalize/demonstrate NWB LUE/LLE precautions with 100% accuracy. 2. Patient will complete functional transfers with modified independence and adaptive device as needed. 3. Patient will bathe and dress total body with minimal assistance and adaptive device as needed. 4. Patient will toilet with set up and adaptive device as needed. 5. Patient will groom with set up and adaptive device as needed. Timeframe: 7 visits      OCCUPATIONAL THERAPY: Daily Note OT Treatment Day # 2   NWB LLE (to be left in boot), WBAT through L elbow    Franklyn Joyce is a 68 y.o. female   PRIMARY DIAGNOSIS: <principal problem not specified>  Open Colles' fracture of left radius [S52.532B]  Procedure(s) (LRB):  CLOSED REDUCTION LEFT ELBOW WITH APPLICATION EXTERNAL FIXATION LEFT WRIST (Left)  3 Days Post-Op  Payor: [email or web address] / Plan: [employer] | [employer] / Product Type: Managed Care Medicare /   ASSESSMENT:     REHAB RECOMMENDATIONS: CURRENT LEVEL OF FUNCTION:  (Most Recently Demonstrated)   Recommendation to date pending progress:  Setting:   Short-term Rehab  Equipment:    To Be Determined Bathing:   Not tested  Dressing:   Not tested  Feeding/Grooming:  [employer] Standby Assistance  Toileting:   Contact Guard Assistance  Functional Mobility:   Contact Guard Assistance     ASSESSMENT:  Ms. Brad Azul is progressing well towards OT goals. Today, pt was received supine in the bed. Per Dr. Dominguez How note, pt is WBAT through L elbow now. Pt is to remain NWB LLE at this time but is awaiting further clarification on the plan/WB status for her LLE. Completed bed mobility with supervision. Grooming tasks sitting EOB with SBA. Performed squat pivot transfers to [facility] and then to chair with CGA. Pt demonstrated good adherence to NWB LLE and remained NWB with the LUE in her sling throughout session.  Though pt is transferring well, pt expressed concern over navigating the 8 steps to get into her home. Would continue to benefit from rehab at this time until safe to d/c home. Ms. Misael Philippe continues to demonstrate overall deficits in strength, balance, activity tolerance, and ADL performance. Continue OT efforts and POC in order to address functional deficits and OT goals stated above. SUBJECTIVE:   Ms. Misael Philippe states, \"I can feel the nail. \"    SOCIAL HISTORY/LIVING ENVIRONMENT:   Home Environment: Private residence  # Steps to Enter: 8  One/Two Story Residence: One story  Living Alone: No  Support Systems: Other Family Member(s), Spouse/Significant Other    OBJECTIVE:     PAIN: VITAL SIGNS: LINES/DRAINS:   Pre Treatment: Pain Screen  Pain Scale 1: Numeric (0 - 10)  Pain Intensity 1: 0  Post Treatment: no change    none       ACTIVITIES OF DAILY LIVING: I Mod I S SBA CGA Min Mod Max Total NT Comments   BASIC ADLs:              Bathing/ Showering [] [] [] [] [] [] [] [] [] [x]    Toileting [] [] [] [] [x] [] [] [] [] [] Squat pivot transfer to [facility] [employer] [] [] [] [] [] [] [] [] [] [x]    Feeding [] [] [] [] [] [] [] [] [] [x]    Grooming [] [] [] [x] [] [] [] [] [] [] Washed face and brushed teeth sitting EOB   Personal Device Care [] [] [] [] [] [] [] [] [] [x]    Functional Mobility [] [] [] [] [x] [] [] [] [] [] Squat pivot t/f   I=Independent, Mod I=Modified Independent, S=Supervision, SBA=Standby Assistance, CGA=Contact Guard Assistance,   Min=Minimal Assistance, Mod=Moderate Assistance, Max=Maximal Assistance, Total=Total Assistance, NT=Not Tested    MOBILITY: I Mod I S SBA CGA Min Mod Max Total  NT x2 Comments:   Supine to sit [] [] [x] [] [] [] [] [] [] [] []    Sit to supine [] [] [x] [] [] [] [] [] [] [] []    Sit to stand [] [] [] [] [] [] [] [] [] [x] []    Bed to chair [] [] [] [] [x] [] [] [] [] [] [] Squat pivot t/f   I=Independent, Mod I=Modified Independent, S=Supervision, SBA=Standby Assistance, CGA=Contact Guard Assistance, Min=Minimal Assistance, Mod=Moderate Assistance, Max=Maximal Assistance, Total=Total Assistance, NT=Not Tested    BALANCE: Good Fair+ Fair Fair- Poor NT Comments   Sitting Static [x] [] [] [] [] []    Sitting Dynamic [] [x] [] [] [] []              Standing Static [] [] [] [] [] [x]    Standing Dynamic [] [] [] [] [] [x]      PLAN:   FREQUENCY/DURATION: OT Plan of Care: 4 times/week for duration of hospital stay or until stated goals are met, whichever comes first.    TREATMENT:   TREATMENT:   ($$ Self Care/Home Management: 8-22 mins$$ Neuromuscular Re-Education: 8-22 mins   )  Co-Treatment PT/OT necessary due to patient's decreased overall endurance/tolerance levels, as well as need for high level skilled assistance to complete functional transfers/mobility and functional tasks  Self Care (15 Minutes): Self care including Toileting, Lower Body Dressing and Grooming to increase independence and decrease level of assistance required. Neuromuscular Re-education (12 Minutes): Neuromuscular Re-education included Balance Training, Postural training and Sitting balance training to improve Balance, Coordination, Functional Mobility and Postural Control.     TREATMENT GRID:  N/A    AFTER TREATMENT POSITION/PRECAUTIONS:  Chair, Needs within reach, RN notified and Visitors at bedside    INTERDISCIPLINARY COLLABORATION:  RN/PCT, PT/PTA and OT/MULTANI    TOTAL TREATMENT DURATION:  OT Patient Time In/Time Out  Time In: 1037  Time Out: Jass Covarrubias, OT

## 2021-11-23 LAB
MM INDURATION POC: 0 MM (ref 0–5)
PPD POC: NEGATIVE NEGATIVE

## 2021-11-23 PROCEDURE — 96376 TX/PRO/DX INJ SAME DRUG ADON: CPT

## 2021-11-23 PROCEDURE — 74011250637 HC RX REV CODE- 250/637: Performed by: PHYSICIAN ASSISTANT

## 2021-11-23 PROCEDURE — 77030038269 HC DRN EXT URIN PURWCK BARD -A

## 2021-11-23 PROCEDURE — 74011250636 HC RX REV CODE- 250/636: Performed by: ORTHOPAEDIC SURGERY

## 2021-11-23 PROCEDURE — G0378 HOSPITAL OBSERVATION PER HR: HCPCS

## 2021-11-23 PROCEDURE — 2709999900 HC NON-CHARGEABLE SUPPLY

## 2021-11-23 PROCEDURE — 97530 THERAPEUTIC ACTIVITIES: CPT

## 2021-11-23 PROCEDURE — 74011250637 HC RX REV CODE- 250/637: Performed by: ORTHOPAEDIC SURGERY

## 2021-11-23 RX ADMIN — FLUTICASONE PROPIONATE 2 SPRAY: 50 SPRAY, METERED NASAL at 08:27

## 2021-11-23 RX ADMIN — ALPRAZOLAM 0.5 MG: 0.5 TABLET ORAL at 17:52

## 2021-11-23 RX ADMIN — PANTOPRAZOLE SODIUM 40 MG: 40 TABLET, DELAYED RELEASE ORAL at 05:27

## 2021-11-23 RX ADMIN — ONDANSETRON 4 MG: 2 INJECTION INTRAMUSCULAR; INTRAVENOUS at 07:22

## 2021-11-23 RX ADMIN — CITALOPRAM HYDROBROMIDE 20 MG: 20 TABLET ORAL at 08:27

## 2021-11-23 RX ADMIN — Medication 5 ML: at 21:17

## 2021-11-23 RX ADMIN — NITROFURANTOIN MACROCRYSTALS 50 MG: 50 CAPSULE ORAL at 11:24

## 2021-11-23 RX ADMIN — ACETAMINOPHEN 650 MG: 325 TABLET ORAL at 23:09

## 2021-11-23 RX ADMIN — NITROFURANTOIN MACROCRYSTALS 50 MG: 50 CAPSULE ORAL at 00:15

## 2021-11-23 RX ADMIN — ACETAMINOPHEN 650 MG: 325 TABLET ORAL at 10:22

## 2021-11-23 RX ADMIN — ACETAMINOPHEN 650 MG: 325 TABLET ORAL at 17:19

## 2021-11-23 RX ADMIN — NITROFURANTOIN MACROCRYSTALS 50 MG: 50 CAPSULE ORAL at 17:18

## 2021-11-23 RX ADMIN — Medication 10 ML: at 17:20

## 2021-11-23 RX ADMIN — NITROFURANTOIN MACROCRYSTALS 50 MG: 50 CAPSULE ORAL at 23:09

## 2021-11-23 RX ADMIN — PRAVASTATIN SODIUM 40 MG: 20 TABLET ORAL at 21:16

## 2021-11-23 RX ADMIN — Medication 5 ML: at 05:27

## 2021-11-23 RX ADMIN — NITROFURANTOIN MACROCRYSTALS 50 MG: 50 CAPSULE ORAL at 05:27

## 2021-11-23 NOTE — PROGRESS NOTES
Pt states she \"feels like she has a UTI, she says she noticed her urine had an odor before she fell. She is requesting for us \"to get a urine sample to test it. \" On call physician notified. Orders received for UA. 2148: On call physician notified of UA results. Orders received for 100 mg Macrobid BID for 5 days. Informed this medication is no longer on formulary. 50 mg macrodantin q6h for 5 days ordered instead.

## 2021-11-23 NOTE — PROGRESS NOTES
ACUTE PHYSICAL THERAPY GOALS:  (Developed with and agreed upon by patient and/or caregiver.)  (1.) Ivonne Priest  will move from supine to sit and sit to supine  with INDEPENDENCE within 7 treatment day(s). (2.) Ivonne Priest will transfer from bed to chair and chair to bed with MODIFIED INDEPENDENCE using the least restrictive device within 7 treatment day(s). (3.) Ivonne Priest will perform bilateral lower extremity exercises x 20 min for HEP with INDEPENDENCE to improve strength, endurance, and functional mobility within 7 treatment day(s). PHYSICAL THERAPY: Daily Note and AM Treatment Day # 3  WBAT through Elbow on LLE (NWB wrist), NWB LLE    Ivonne Priest is a 68 y.o. female   PRIMARY DIAGNOSIS: <principal problem not specified>  Open Colles' fracture of left radius [S52.532B]  Procedure(s) (LRB):  CLOSED REDUCTION LEFT ELBOW WITH APPLICATION EXTERNAL FIXATION LEFT WRIST (Left)  4 Days Post-Op    ASSESSMENT:     REHAB RECOMMENDATIONS: CURRENT LEVEL OF FUNCTION:  (Most Recently Demonstrated)   Recommendation to date pending progress:  Setting:   Short-term Rehab  Equipment:    To Be Determined Bed Mobility:   Supervision  Sit to Stand:  [employer] Assistance  Transfers:  [employer] Guard Assistance  Gait/Mobility:   Minimal Assistance     ASSESSMENT:  Ms. Vazquez Barreto was supine upon contact and agreeable to PT. Patient performs supine to sit with supervision where she demonstrates intact sitting balance. Patient transferred to standing with CGA where she attempted gait with platform walker. Patient unable to hop on RLE but able to perform heel toe slides during transfer to recliner chair. Patient then participated in LE exercises . Patient transferred back to bed with CGA and cues to maintain NWB status on LLE. Overall patient is moving well but does have 8 stairs to navigate into her home. Patient at this time would benefit from rehab prior to d/chome.  Steady progress.      SUBJECTIVE:   Ms. Tricia Shahid states, \"I could maybe stay with my sister\"    SOCIAL HISTORY/ LIVING ENVIRONMENT: See initial evaluation  Home Environment: Private residence  # Steps to Enter: 8  One/Two Story Residence: One story  Living Alone: No  Support Systems: Other Family Member(s), Spouse/Significant Other  OBJECTIVE:     PAIN: VITAL SIGNS: LINES/DRAINS:   Pre Treatment: Pain Screen  Pain Scale 1: FLACC  Pain Intensity 1: 0  Post Treatment: 0   None  O2 Device: None (Room air)     MOBILITY: I Mod I S SBA CGA Min Mod Max Total  NT x2 Comments:   Bed Mobility    Rolling [] [] [] [] [] [] [] [] [] [] []    Supine to Sit [] [] [x] [] [] [] [] [] [] [] []    Scooting [] [] [] [] [] [] [] [] [] [] []    Sit to Supine [] [] [] [] [] [] [] [] [] [] []    Transfers    Sit to Stand [] [] [] [] [x] [] [] [] [] [] []    Bed to Chair [] [] [] [] [x] [] [] [] [] [] []    Stand to Sit [] [] [] [] [x] [] [] [] [] [] []    I=Independent, Mod I=Modified Independent, S=Supervision, SBA=Standby Assistance, CGA=Contact Guard Assistance,   Min=Minimal Assistance, Mod=Moderate Assistance, Max=Maximal Assistance, Total=Total Assistance, NT=Not Tested    BALANCE: Good Fair+ Fair Fair- Poor NT Comments   Sitting Static [x] [] [] [] [] []    Sitting Dynamic [x] [] [] [] [] []              Standing Static [x] [] [] [] [] []    Standing Dynamic [] [x] [] [] [] []      GAIT: I Mod I S SBA CGA Min Mod Max Total  NT x2 Comments:   Level of Assistance [] [] [] [] [] [x] [] [] [] [x] []    Distance Unable to hop    DME Platform walker    Gait Quality     Weightbearing  Status N/A     I=Independent, Mod I=Modified Independent, S=Supervision, SBA=Standby Assistance, CGA=Contact Guard Assistance,   Min=Minimal Assistance, Mod=Moderate Assistance, Max=Maximal Assistance, Total=Total Assistance, NT=Not Tested    PLAN:   FREQUENCY/DURATION: PT Plan of Care: Daily for duration of hospital stay or until stated goals are met, whichever comes first.  TREATMENT:     TREATMENT:   (     )  Therapeutic Activity (25 Minutes): Therapeutic activity included Supine to Sit, Sit to Supine, Scooting, Lateral Scooting, Transfer Training, Ambulation on level ground, Sitting balance , Standing balance and LE exercises to improve functional Mobility, Strength and Activity tolerance.     TREATMENT GRID:  N/A    AFTER TREATMENT POSITION/PRECAUTIONS:  Bed, Needs within reach and RN notified    INTERDISCIPLINARY COLLABORATION:  RN/PCT and PT/PTA    TOTAL TREATMENT DURATION:  PT Patient Time In/Time Out  Time In: 0830  Time Out: [id number] Terese Estevez PTA

## 2021-11-23 NOTE — PROGRESS NOTES
Problem: Falls - Risk of  Goal: *Absence of Falls  Description: Document Kingsley Cheney Fall Risk and appropriate interventions in the flowsheet.   Outcome: Progressing Towards Goal  Note: Fall Risk Interventions:  Mobility Interventions: Bed/chair exit alarm, OT consult for ADLs, Patient to call before getting OOB, PT Consult for mobility concerns         Medication Interventions: Bed/chair exit alarm, Evaluate medications/consider consulting pharmacy, Patient to call before getting OOB, Teach patient to arise slowly    Elimination Interventions: Call light in reach, Bed/chair exit alarm, Patient to call for help with toileting needs, Stay With Me (per policy)    History of Falls Interventions: Bed/chair exit alarm, Door open when patient unattended, Investigate reason for fall         Problem: Patient Education: Go to Patient Education Activity  Goal: Patient/Family Education  Outcome: Progressing Towards Goal     Problem: Urinary Tract Infection - Adult  Goal: *Absence of infection signs and symptoms  Outcome: Progressing Towards Goal     Problem: Patient Education: Go to Patient Education Activity  Goal: Patient/Family Education  Outcome: Progressing Towards Goal     Problem: Patient Education: Go to Patient Education Activity  Goal: Patient/Family Education  Outcome: Progressing Towards Goal

## 2021-11-23 NOTE — PROGRESS NOTES
Pt is 69 yo female admitted for surgical repair of an open Colles' fracture of left radius. Pt has also injured her left ankle and is NWB. CM consult received to assist with rehab placement. MIRYAM met with pt to discuss options. Demographics, insurance and PCP confirmed. Pt is unsure if she wants to go to rehab and is considering going to her sister's home that only has 2 steps to enter. MIRYAM encouraged her that if she possibly wants to go to rehab that we need to pursue that option first and if she changes her mind we can cancel the referrals, etc.  Pt is in agreement. From a list of facilities in network with her insurance she requested referrals to [address] [place] [address] and [facility]. Referral submitted. Awaiting a response. Pt's insurance will require precert which SW will initiate once bed offer is received. PPD placed 11/22/21. Rapid COVID test will be ordered closer to the day of dc. SW following to facilitate pt's transfer to rehab at [facility].    1450: Pt was accepted for admission to [address] [place] [address] pending insurance approval.  Insurance auth request initiated by MIRYAM and clinicals faxed to [employer]. Pending auth reference [id number]. Care Management Interventions  PCP Verified by CM: Yes  Last Visit to PCP: 10/06/21  Mode of Transport at Discharge: BLS  Transition of Care Consult (CM Consult): SNF  Discharge Durable Medical Equipment: No  Physical Therapy Consult: Yes  Occupational Therapy Consult: Yes  Speech Therapy Consult: No  Support Systems: Other Family Member(s), Spouse/Significant Other  Confirm Follow Up Transport: Family  The Plan for Transition of Care is Related to the Following Treatment Goals : STR to improve pt's strength and functional abilities for a safe transition to home.   The Patient and/or Patient Representative was Provided with a Choice of Provider and Agrees with the Discharge Plan?: Yes  Freedom of Choice List was Provided with Basic Dialogue that Supports the Patient's Individualized Plan of Care/Goals, Treatment Preferences and Shares the Quality Data Associated with the Providers?: Yes  Discharge Location  Discharge Placement: Rehab Unit Subacute

## 2021-11-24 LAB
MM INDURATION POC: 0 MM (ref 0–5)
PPD POC: NEGATIVE NEGATIVE

## 2021-11-24 PROCEDURE — 74011250637 HC RX REV CODE- 250/637: Performed by: PHYSICIAN ASSISTANT

## 2021-11-24 PROCEDURE — 96376 TX/PRO/DX INJ SAME DRUG ADON: CPT

## 2021-11-24 PROCEDURE — 74011250637 HC RX REV CODE- 250/637: Performed by: ORTHOPAEDIC SURGERY

## 2021-11-24 PROCEDURE — G0378 HOSPITAL OBSERVATION PER HR: HCPCS

## 2021-11-24 RX ORDER — CITALOPRAM 20 MG/1
20 TABLET, FILM COATED ORAL DAILY
Qty: 30 TABLET | Refills: 0 | Status: SHIPPED | OUTPATIENT
Start: 2021-11-24

## 2021-11-24 RX ORDER — DOCUSATE SODIUM 100 MG/1
100 CAPSULE, LIQUID FILLED ORAL DAILY
Status: DISCONTINUED | OUTPATIENT
Start: 2021-11-24 | End: 2021-11-25 | Stop reason: HOSPADM

## 2021-11-24 RX ORDER — HYDROCODONE BITARTRATE AND ACETAMINOPHEN 10; 325 MG/1; MG/1
1 TABLET ORAL
Qty: 12 TABLET | Refills: 0 | Status: SHIPPED | OUTPATIENT
Start: 2021-11-24 | End: 2021-11-27

## 2021-11-24 RX ORDER — ALPRAZOLAM 0.5 MG/1
0.5 TABLET ORAL
Qty: 30 TABLET | Refills: 0 | Status: SHIPPED | OUTPATIENT
Start: 2021-11-24 | End: 2021-12-10 | Stop reason: SDUPTHER

## 2021-11-24 RX ADMIN — HYDROCODONE BITARTRATE AND ACETAMINOPHEN 1 TABLET: 10; 325 TABLET ORAL at 18:35

## 2021-11-24 RX ADMIN — Medication 5 ML: at 22:12

## 2021-11-24 RX ADMIN — Medication 5 ML: at 05:02

## 2021-11-24 RX ADMIN — HYDROCODONE BITARTRATE AND ACETAMINOPHEN 1 TABLET: 10; 325 TABLET ORAL at 22:17

## 2021-11-24 RX ADMIN — NITROFURANTOIN MACROCRYSTALS 50 MG: 50 CAPSULE ORAL at 16:35

## 2021-11-24 RX ADMIN — CITALOPRAM HYDROBROMIDE 20 MG: 20 TABLET ORAL at 07:38

## 2021-11-24 RX ADMIN — HYDROCODONE BITARTRATE AND ACETAMINOPHEN 1 TABLET: 10; 325 TABLET ORAL at 10:15

## 2021-11-24 RX ADMIN — DOCUSATE SODIUM 100 MG: 100 CAPSULE ORAL at 10:15

## 2021-11-24 RX ADMIN — ALPRAZOLAM 0.5 MG: 0.5 TABLET ORAL at 22:11

## 2021-11-24 RX ADMIN — PANTOPRAZOLE SODIUM 40 MG: 40 TABLET, DELAYED RELEASE ORAL at 05:02

## 2021-11-24 RX ADMIN — PRAVASTATIN SODIUM 40 MG: 20 TABLET ORAL at 22:11

## 2021-11-24 RX ADMIN — ONDANSETRON 4 MG: 4 TABLET, ORALLY DISINTEGRATING ORAL at 18:30

## 2021-11-24 RX ADMIN — NITROFURANTOIN MACROCRYSTALS 50 MG: 50 CAPSULE ORAL at 12:23

## 2021-11-24 RX ADMIN — ACETAMINOPHEN 650 MG: 325 TABLET ORAL at 05:06

## 2021-11-24 RX ADMIN — NITROFURANTOIN MACROCRYSTALS 50 MG: 50 CAPSULE ORAL at 05:02

## 2021-11-24 NOTE — DISCHARGE SUMMARY
[facility] Discharge Summary       Patient ID:  Marleny Langley  [id number]  48 y.o.  1945  Admit date: 11/19/2021  Discharge date: 11/24/2021  Attending: Cammie Gayle MD  PCP:  Latesha Fish NP  Treatment Team: Attending Provider: Cammie Gayle MD; Consulting Provider: Reji Conradma; Surgeon: Cammie Gayle MD; Utilization Review: Cleavon Ormond; Care Manager: Jeremy Pal LMSW; Primary Nurse: Johnathon Vernon RN; Physical Therapy Assistant: Grupo Person PTA; Occupational Therapist: Renzo Garcia OT    Principal Diagnosis Open Colles' fracture of left radius    Hospital Problems as of 11/24/2021 Date Reviewed: 10/6/2021          Codes Class Noted - Resolved POA    Achilles tendon injury, left, initial encounter ICD-10-CM: S86.002A  ICD-9-CM: 959.7  11/22/2021 - Present Yes        * (Principal) Open Colles' fracture of left radius ICD-10-CM: S52.532B  ICD-9-CM: 813.51  11/19/2021 - Present Unknown                  Hospital Course:  Please refer to the admission H&P for details of presentation. In summary, the patient was seen in the Emergency Dept after sustaining a fall down 5 stairs and was found to have a closed dislocation of the left elbow and open distal both bone distal radius and ulna fracture. She was also felt to have a partial vs full left achilles tendon rupture. She underwent irrigation and external fixation of the left wrist and closed reduction of the left elbow. We had ordered an MRI of the left ankle but were told by the MRI department that because of the external fixator this could not be obtained even though the external fixator says MRI compatible on it. We placed the patient in walker boot and made her non-weight bearing. The patient does not think she can care for herself at home and has steps at home.  was consulted for placement. Her pain is well controlled and she is ready for DC at this time. Significant Diagnostic Studies:    Labs: Results:       Chemistry No results for input(s): GLU, NA, K, CL, CO2, BUN, CREA, CA, AGAP, BUCR, TBIL, AP, TP, ALB, GLOB, AGRAT in the last 72 hours. No lab exists for component: GPT   CBC w/Diff No results for input(s): WBC, RBC, HGB, HCT, PLT, GRANS, LYMPH, EOS, HGBEXT, HCTEXT, PLTEXT in the last 72 hours. Cardiac Enzymes No results for input(s): CPK, CKND1, DIANA in the last 72 hours. No lab exists for component: CKRMB, TROIP   Coagulation No results for input(s): PTP, INR, APTT, INREXT in the last 72 hours. BNP No results for input(s): BNPP in the last 72 hours. Liver Enzymes No results for input(s): TP, ALB, TBIL, AP in the last 72 hours. No lab exists for component: SGOT, GPT, DBIL   Thyroid Studies Lab Results   Component Value Date/Time    TSH 2.180 10/08/2019 09:05 AM            Imaging:  XR HUMERUS LT    Result Date: 11/19/2021  Left ankle: 1. No acute osseous abnormality. Left humerus, elbow, and forearm: 1. Posterior elbow dislocation. 2. Distal radial and ulnar fractures as described. XR ELBOW LT AP/LAT    Result Date: 11/19/2021  Reduction of the elbow dislocation. XR ELBOW LT AP/LAT    Result Date: 11/19/2021  Gross anatomic alignment on this limited fluoroscopic exam. Recommend dedicated left elbow radiographs for more optimal assessment. XR ELBOW LT AP/LAT    Result Date: 11/19/2021  Left ankle: 1. No acute osseous abnormality. Left humerus, elbow, and forearm: 1. Posterior elbow dislocation. 2. Distal radial and ulnar fractures as described. XR WRIST LT AP/LAT    Result Date: 11/19/2021  Postoperative changes of the distal radius and ulna as described. XR WRIST LT AP/LAT    Result Date: 11/19/2021  Postoperative changes of the distal radius and ulna. XR WRIST LT AP/LAT/OBL MIN 3V    Result Date: 11/19/2021  Left ankle: 1. No acute osseous abnormality. Left humerus, elbow, and forearm: 1.  Posterior elbow dislocation. 2. Distal radial and ulnar fractures as described. XR ANKLE LT MIN 3 V    Result Date: 11/19/2021  Left ankle: 1. No acute osseous abnormality. Left humerus, elbow, and forearm: 1. Posterior elbow dislocation. 2. Distal radial and ulnar fractures as described. CT HEAD WO CONT    Result Date: 11/19/2021  1. No CT evidence of acute intracranial abnormality. 2.  No evidence of cervical spine fracture      CT SPINE CERV WO CONT    Result Date: 11/19/2021  1. No CT evidence of acute intracranial abnormality. 2.  No evidence of cervical spine fracture      CT ELBOW LT WO CONT    Result Date: 11/20/2021  1. Normal articulation of left elbow. NC XR TECHNOLOGIST SERVICE    Result Date: 11/22/2021  A Radiologist has not reviewed images associated with this exam.    NC XR TECHNOLOGIST SERVICE    Result Date: 11/22/2021  A Radiologist has not reviewed images associated with this exam.      Microbiology/Cultures: All Micro Results     Procedure Component Value Units Date/Time    COVID-19 RAPID TEST [[id number]] Collected: 11/19/21 1646    Order Status: Completed Specimen: Nasopharyngeal Updated: 11/19/21 1715     Specimen source Nasopharyngeal        COVID-19 rapid test Not detected        Comment:      The specimen is NEGATIVE for SARS-CoV-2, the novel coronavirus associated with COVID-19. A negative result does not rule out COVID-19. This test has been authorized by the FDA under an Emergency Use Authorization (EUA) for use by authorized laboratories.         Fact sheet for Healthcare Providers: ConventionUpdate.co.nz  Fact sheet for Patients: ConventionUpdate.co.nz       Methodology: Isothermal Nucleic Acid Amplification               Discharge Exam:  Visit Vitals  /79   Pulse 84   Temp 97.9 °F (36.6 °C)   Resp 18   Ht 5' (1.524 m)   Wt 160 lb (72.6 kg)   SpO2 94%   BMI 31.25 kg/m²     General appearance: alert, cooperative, no distress, appears stated age  Lungs: clear breath sounds  Heart: regular rate and rhythm  Abdomen: soft, non-tender. Bowel sounds normal. No masses,  no organomegaly  Skin: external fixator on left upper extremity with dressing intact. No active bleeding  Musculoskeletal: left elbow splinted and external fixator in place. NV intact left upper extremity. Left foot in walker boot is NV intact. Neurologic: Grossly normal    Disposition: SNF  Discharge Condition: stable  Patient Instructions:   Current Discharge Medication List      START taking these medications    Details   HYDROcodone-acetaminophen (NORCO)  mg tablet Take 1 Tablet by mouth every four (4) hours as needed for Pain for up to 3 days. Max Daily Amount: 6 Tablets. Qty: 12 Tablet, Refills: 0  Start date: 11/24/2021, End date: 11/27/2021    Associated Diagnoses: Achilles tendon injury, left, initial encounter; Type I or II open Colles' fracture of left radius, initial encounter         CONTINUE these medications which have CHANGED    Details   ALPRAZolam (Xanax) 0.5 mg tablet Take 1 Tablet by mouth nightly as needed for Anxiety. Max Daily Amount: 0.5 mg. Indications: anxious  Qty: 30 Tablet, Refills: 0  Start date: 11/24/2021    Associated Diagnoses: Mild episode of recurrent major depressive disorder ([facility])      citalopram (CELEXA) 20 mg tablet Take 1 Tablet by mouth daily. Indications: major depressive disorder  Qty: 30 Tablet, Refills: 0  Start date: 11/24/2021    Associated Diagnoses: Mild episode of recurrent major depressive disorder ([facility] [address]         CONTINUE these medications which have NOT CHANGED    Details   alendronate (FOSAMAX) 70 mg tablet Take 1 Tablet by mouth every seven (7) days. Indications: decreased bone mass following menopause  Qty: 4 Tablet, Refills: 5    Associated Diagnoses: Age-related osteoporosis without current pathological fracture      acetaminophen (TYLENOL EXTRA STRENGTH PO) Take  by mouth two (2) times a day.       pantoprazole (PROTONIX) 40 mg tablet Take 1 Tablet by mouth daily. Qty: 90 Tablet, Refills: 3    Associated Diagnoses: Gastroesophageal reflux disease without esophagitis      meloxicam (Mobic) 15 mg tablet Take 1 Tab by mouth nightly. Qty: 90 Tab, Refills: 3    Associated Diagnoses: Primary osteoarthritis involving multiple joints      pravastatin (PRAVACHOL) 40 mg tablet Take 1 Tab by mouth nightly. Qty: 90 Tab, Refills: 3    Associated Diagnoses: Mixed hyperlipidemia      fluticasone propionate (FLONASE) 50 mcg/actuation nasal spray 2 Sprays by Both Nostrils route daily. Indications: inflammation of the nose due to an allergy  Qty: 1 Bottle, Refills: 3    Associated Diagnoses: Allergic rhinitis, unspecified seasonality, unspecified trigger      dexAMETHasone (Decadron) 0.5 mg/5 mL elixir Take  by mouth nightly. cholecalciferol, vitamin D3, (VITAMIN D3 PO) Take  by mouth daily. clotrimazole (LOTRIMIN) 1 % topical cream Apply  to affected area two (2) times daily as needed. CALCIUM CARBONATE/VITAMIN D3 (CALCIUM 600 + D,3, PO) Take 1 Tab by mouth daily. SENNOSIDES (SENNA LAX PO) Take 1 Tab by mouth daily. MULTIVITAMIN PO Take 1 Tab by mouth daily. Activity: ok to weight bear through left elbow but no weight bearing of left wrist. No weight bearing of left foot and should stay in walker boot. Pad left lower leg to prevent pressure wounds  Diet: Regular Diet  Wound Care: None needed    Follow-up: 11/30/21 at [facility]. Appt to be on chart.         Electronically Signed:  ANJALI Khan  11/24/2021  11:53 AM

## 2021-11-24 NOTE — PROGRESS NOTES
Problem: Falls - Risk of  Goal: *Absence of Falls  Description: Document Joellen Noe Fall Risk and appropriate interventions in the flowsheet.   11/24/2021 1132 by Luis Enrique Vernon RN  Outcome: Progressing Towards Goal  Note: Fall Risk Interventions:  Mobility Interventions: Bed/chair exit alarm, Patient to call before getting OOB         Medication Interventions: Bed/chair exit alarm, Patient to call before getting OOB, Teach patient to arise slowly    Elimination Interventions: Bed/chair exit alarm, Call light in reach, Patient to call for help with toileting needs    History of Falls Interventions: Bed/chair exit alarm      11/24/2021 1027 by Janeen Mckeon, RN  Outcome: Progressing Towards Goal  Note: Fall Risk Interventions:  Mobility Interventions: Bed/chair exit alarm, OT consult for ADLs, PT Consult for mobility concerns, Patient to call before getting OOB         Medication Interventions: Bed/chair exit alarm, Evaluate medications/consider consulting pharmacy, Patient to call before getting OOB, Teach patient to arise slowly    Elimination Interventions: Call light in reach, Bed/chair exit alarm, Patient to call for help with toileting needs, Stay With Me (per policy)    History of Falls Interventions: Bed/chair exit alarm, Door open when patient unattended, Investigate reason for fall         Problem: Patient Education: Go to Patient Education Activity  Goal: Patient/Family Education  11/24/2021 1132 by Luis Enrique Vernon RN  Outcome: Progressing Towards Goal  11/24/2021 1027 by Janeen Mckeon, RN  Outcome: Progressing Towards Goal     Problem: Patient Education: Go to Patient Education Activity  Goal: Patient/Family Education  11/24/2021 1132 by Guevara Breaux RN  Outcome: Progressing Towards Goal  11/24/2021 1027 by Janeen Mckeon, RN  Outcome: Progressing Towards Goal     Problem: Urinary Tract Infection - Adult  Goal: *Absence of infection signs and symptoms  11/24/2021 1132 by Luis Enrique Vernon RN  Outcome: Progressing Towards Goal  11/24/2021 1027 by Janeen Spear RN  Outcome: Progressing Towards Goal     Problem: Patient Education: Go to Patient Education Activity  Goal: Patient/Family Education  11/24/2021 1132 by Jimmie Crandall RN  Outcome: Progressing Towards Goal  11/24/2021 1027 by Janeen Spear RN  Outcome: Progressing Towards Goal     Problem: Patient Education: Go to Patient Education Activity  Goal: Patient/Family Education  11/24/2021 1132 by Jimmie Crandall RN  Outcome: Progressing Towards Goal  11/24/2021 1027 by Janeen Spear RN  Outcome: Progressing Towards Goal     Problem: Upper Extremity Surgical Pathway: Discharge Outcomes  Goal: *Verbalizes name, dosage, time, side effects, and number of days to continue medications  11/24/2021 1132 by Jimmie Crandall RN  Outcome: Progressing Towards Goal  11/24/2021 1027 by Janeen Spear RN  Outcome: Progressing Towards Goal  Goal: *Describes available resources and support systems  11/24/2021 1132 by Jimmie Crandall RN  Outcome: Progressing Towards Goal  11/24/2021 1027 by Janeen Spear RN  Outcome: Progressing Towards Goal  Goal: *Describes follow-up/return visits to physicians  11/24/2021 1132 by Jimmie Crandall RN  Outcome: Progressing Towards Goal  11/24/2021 1027 by Janeen Spear RN  Outcome: Progressing Towards Goal  Goal: *Tolerating diet  11/24/2021 1132 by Jimmie Crandall RN  Outcome: Progressing Towards Goal  11/24/2021 1027 by Janeen Spear RN  Outcome: Progressing Towards Goal  Goal: *Optimal pain control at patient's stated goal  11/24/2021 1132 by Iza Vernon RN  Outcome: Progressing Towards Goal  11/24/2021 1027 by Janeen Spear RN  Outcome: Progressing Towards Goal  Goal: *Lungs clear or at baseline  11/24/2021 1132 by Jimmie Crandall RN  Outcome: Progressing Towards Goal  11/24/2021 1027 by Iza Vernon RN  Outcome: Progressing Towards Goal  Goal: *Afebrile  11/24/2021 1132 by Iza Vernon, RN  Outcome: Progressing Towards Goal  11/24/2021 1027 by Janeen Bagley RN  Outcome: Progressing Towards Goal  Goal: *Incision intact without signs of infection, redness, warmth  11/24/2021 1132 by Andrew Vernno RN  Outcome: Progressing Towards Goal  11/24/2021 1027 by Janeen Bagley RN  Outcome: Progressing Towards Goal  Goal: *Absence of deep venous thrombosis signs and symptoms(Stroke Metric)  11/24/2021 1132 by Andrew Vernon RN  Outcome: Progressing Towards Goal  11/24/2021 1027 by Wilder David RN  Outcome: Progressing Towards Goal  Goal: *Active bowel function  11/24/2021 1132 by Wilder David RN  Outcome: Progressing Towards Goal  11/24/2021 1027 by Janeen Bagley RN  Outcome: Progressing Towards Goal  Goal: *Adequate urinary output  11/24/2021 1132 by Wilder David RN  Outcome: Progressing Towards Goal  11/24/2021 1027 by Janeen Bagley RN  Outcome: Progressing Towards Goal  Goal: *Discharge anxiety minimal  11/24/2021 1132 by Wilder David RN  Outcome: Progressing Towards Goal  11/24/2021 1027 by Janeen Bagley RN  Outcome: Progressing Towards Goal  Goal: *Describes available resources and support systems  11/24/2021 1132 by Wilder David RN  Outcome: Progressing Towards Goal  11/24/2021 1027 by Janeen Bagley RN  Outcome: Progressing Towards Goal  Goal: *Labs within defined limits  11/24/2021 1132 by Andrew Vernon RN  Outcome: Progressing Towards Goal  11/24/2021 1027 by Janeen Bagley RN  Outcome: Progressing Towards Goal  Goal: *Hemodynamically stable  11/24/2021 1132 by Andrew Vernon RN  Outcome: Progressing Towards Goal  11/24/2021 1027 by Janeen Bagley RN  Outcome: Progressing Towards Goal  Goal: *Demonstrates ability to don and doff sling/swath/positioning aid  11/24/2021 1132 by Andrew Vernon RN  Outcome: Progressing Towards Goal  11/24/2021 1027 by Janeen Bagley, RN  Outcome: Progressing Towards Goal  Goal: *Modified independence with transfers, ambulation on levels with assistance devices, stair climbing, ADL's  11/24/2021 1132 by Francisco Briones RN  Outcome: Progressing Towards Goal  11/24/2021 1027 by Sadie Vernon RN  Outcome: Progressing Towards Goal  Goal: *Demonstrates independence with home exercise program  11/24/2021 1132 by Francisco Briones RN  Outcome: Progressing Towards Goal  11/24/2021 1027 by Sadie Vernon, RN  Outcome: Progressing Towards Goal

## 2021-11-24 NOTE — PROGRESS NOTES
Problem: Falls - Risk of  Goal: *Absence of Falls  Description: Document Jacobo Malagon Fall Risk and appropriate interventions in the flowsheet.   Outcome: Progressing Towards Goal  Note: Fall Risk Interventions:  Mobility Interventions: Bed/chair exit alarm, OT consult for ADLs, PT Consult for mobility concerns, Patient to call before getting OOB         Medication Interventions: Bed/chair exit alarm, Evaluate medications/consider consulting pharmacy, Patient to call before getting OOB, Teach patient to arise slowly    Elimination Interventions: Call light in reach, Bed/chair exit alarm, Patient to call for help with toileting needs, Stay With Me (per policy)    History of Falls Interventions: Bed/chair exit alarm, Door open when patient unattended, Investigate reason for fall         Problem: Patient Education: Go to Patient Education Activity  Goal: Patient/Family Education  Outcome: Progressing Towards Goal     Problem: Patient Education: Go to Patient Education Activity  Goal: Patient/Family Education  Outcome: Progressing Towards Goal     Problem: Urinary Tract Infection - Adult  Goal: *Absence of infection signs and symptoms  Outcome: Progressing Towards Goal     Problem: Patient Education: Go to Patient Education Activity  Goal: Patient/Family Education  Outcome: Progressing Towards Goal     Problem: Upper Extremity Surgical Pathway: Discharge Outcomes  Goal: *Verbalizes name, dosage, time, side effects, and number of days to continue medications  Outcome: Progressing Towards Goal  Goal: *Describes available resources and support systems  Outcome: Progressing Towards Goal  Goal: *Describes follow-up/return visits to physicians  Outcome: Progressing Towards Goal  Goal: *Tolerating diet  Outcome: Progressing Towards Goal  Goal: *Optimal pain control at patient's stated goal  Outcome: Progressing Towards Goal  Goal: *Lungs clear or at baseline  Outcome: Progressing Towards Goal  Goal: *Afebrile  Outcome: Progressing Towards Goal  Goal: *Incision intact without signs of infection, redness, warmth  Outcome: Progressing Towards Goal  Goal: *Absence of deep venous thrombosis signs and symptoms(Stroke Metric)  Outcome: Progressing Towards Goal  Goal: *Active bowel function  Outcome: Progressing Towards Goal  Goal: *Adequate urinary output  Outcome: Progressing Towards Goal  Goal: *Discharge anxiety minimal  Outcome: Progressing Towards Goal  Goal: *Describes available resources and support systems  Outcome: Progressing Towards Goal  Goal: *Labs within defined limits  Outcome: Progressing Towards Goal  Goal: *Hemodynamically stable  Outcome: Progressing Towards Goal  Goal: *Demonstrates ability to don and doff sling/swath/positioning aid  Outcome: Progressing Towards Goal  Goal: *Modified independence with transfers, ambulation on levels with assistance devices, stair climbing, ADL's  Outcome: Progressing Towards Goal  Goal: *Demonstrates independence with home exercise program  Outcome: Progressing Towards Goal

## 2021-11-24 NOTE — PROGRESS NOTES
Problem: Falls - Risk of  Goal: *Absence of Falls  Description: Document Liban Harrell Fall Risk and appropriate interventions in the flowsheet.   Outcome: Progressing Towards Goal  Note: Fall Risk Interventions:  Mobility Interventions: Bed/chair exit alarm, OT consult for ADLs, PT Consult for mobility concerns, Patient to call before getting OOB         Medication Interventions: Bed/chair exit alarm, Evaluate medications/consider consulting pharmacy, Patient to call before getting OOB, Teach patient to arise slowly    Elimination Interventions: Call light in reach, Bed/chair exit alarm, Patient to call for help with toileting needs, Stay With Me (per policy)    History of Falls Interventions: Bed/chair exit alarm, Door open when patient unattended, Investigate reason for fall         Problem: Patient Education: Go to Patient Education Activity  Goal: Patient/Family Education  Outcome: Progressing Towards Goal     Problem: Urinary Tract Infection - Adult  Goal: *Absence of infection signs and symptoms  Outcome: Progressing Towards Goal     Problem: Patient Education: Go to Patient Education Activity  Goal: Patient/Family Education  Outcome: Progressing Towards Goal     Problem: Patient Education: Go to Patient Education Activity  Goal: Patient/Family Education  Outcome: Progressing Towards Goal

## 2021-11-24 NOTE — PROGRESS NOTES
TC to [facility]. The insurance auth request is still pending and under review. 1725: Insurance approval received. Insurance [employer] #[id number]. Initial approval for 7 days with the next continued stay review due 11/30/2021. Facility notified and they can accept her tomorrow. Pt's dc packet is complete and transport is scheduled for 10am on 11/25/21 through [facility]. RN to call report to #[phone]. SW notified pt and spouse of insurance approval and plan and they are in agreement. SW remains available to assist as needed. Care Management Interventions  PCP Verified by CM: Yes  Last Visit to PCP: 10/06/21  Mode of Transport at Discharge: BLS (transport scheduled for 11/25/21)  Hospital Transport Time of Discharge: [address] (CM Consult): SNF  Partner SNF: No  Reason Why Partner SNF Not Chosen: Location, Not covered by payor  Discharge Durable Medical Equipment: No  Physical Therapy Consult: Yes  Occupational Therapy Consult: Yes  Speech Therapy Consult: No  Support Systems: Other Family Member(s), Spouse/Significant Other  Confirm Follow Up Transport: Family  The Plan for Transition of Care is Related to the Following Treatment Goals : STR to improve pt's strength and functional abilities for a safe transition to home.   The Patient and/or Patient Representative was Provided with a Choice of Provider and Agrees with the Discharge Plan?: Yes  Freedom of Choice List was Provided with Basic Dialogue that Supports the Patient's Individualized Plan of Care/Goals, Treatment Preferences and Shares the Quality Data Associated with the Providers?: Yes  Discharge Location  Discharge Placement: Rehab Unit Subacute ([facility])

## 2021-11-24 NOTE — PROGRESS NOTES
's initial visit to explore spiritual needs and convey care and concern. The visit was welcomed Mrs. Misael Mayorga and her spouse and I offered spiritual interventions including active listening and prayer as requested. Mrs. Misael Mayorga is anticipating discharge to rehab tomorrow.      Sarah 86, Teena 68  Board Certified

## 2021-11-24 NOTE — DISCHARGE INSTRUCTIONS
[address] [facility]    IF YOU HAVE ANY PROBLEMS ONCE YOU ARE AT HOME CALL THE FOLLOWING NUMBERS:   Main office number: [phone] ask for Madeline Gauthier (medical assistant with Dr. Yun Slider)  Office Address: [address] [place] [address],[address] [address] [address] , [address] [facility]      Patient Discharge Instructions    Wenceslao Anuel / [id number] : 1945    Admitted 2021 Discharged: 2021         To be given to [address] on Admission         Weight bearing status: non-weight bearing on left foot, ok to transfer using right leg     Activity  · Continue Physical Therapy and Occupational Therapy   · Fall precautions   · Ok to bear weight through elbow on the left but no weight bearing on the left wrist   · Keep left foot in walker boot    Wound Care   Leave dressing and splint in place, call if there is drainage or bleeding      Diet  · Resume regular or diabetic diet      Medications    · Patient medications are listed on the medication reconciliation sheet. Follow up    Follow up in our office on    All patients are to be transported via stretcher unless they are able to independently get out of a chair and stand without assistance. Information obtained by :  I understand that if any problems occur once I am at home I am to contact my physician. I understand and acknowledge receipt of the instructions indicated above.                                                                                                                                            Physician's or R.N.'s Signature                                                                  Date/Time                                                                                                                                              Patient or Representative Signature                                                          Date/Time

## 2021-11-24 NOTE — PROGRESS NOTES
2021         Post Op day: 5 Days Post-Op Procedure(s) (LRB):  CLOSED REDUCTION LEFT ELBOW WITH APPLICATION EXTERNAL FIXATION LEFT WRIST (Left)      Admit Date: 2021  Admit Diagnosis: Open Colles' fracture of left radius [S52.532B]       Principle Problem: Open Colles' fracture of left radius. Subjective: Doing ok,  No new complaints, No SOB, No Chest Pain, No Nausea or Vomiting     Objective:   Vital Signs are Stable, No Acute Distress, Alert,  Dressing is Dry,  Neurovascular exam is normal.with the both upper and lower extremities. Left foot in walker boot     Assessment / Plan :  Patient Active Problem List   Diagnosis Code    Pain in joint, lower leg M25.569    Mixed hyperlipidemia E78.2    Disorder of bone and cartilage, unspecified M89.9, M94.9    Cough R05.9    GERD (gastroesophageal reflux disease) K21.9    Depression F32. 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     Lab Results   Component Value Date/Time    HGB 14.3 2021 04:00 PM          S/P Procedure(s) (LRB):  CLOSED REDUCTION LEFT ELBOW WITH APPLICATION EXTERNAL FIXATION LEFT WRIST (Left)    Left achilles tendon injury: unable to obtain an MRI as inpatient due to ex-fix on left wrist. Will keep in left walker boot. [id number] Mayda Zaragoza for transfers on right leg but will keep left ankle in boot and continue non-weight bearing on the left  and will recheck next week in the office.      Fall Precautions  DC disp: rehab placement   F/U: next Tuesday in the office with Dr. Jennifer Delgado.           Signed By: ANJALI Coelho  11/24/2021,  7:56 AM

## 2021-11-25 PROCEDURE — 74011250637 HC RX REV CODE- 250/637: Performed by: PHYSICIAN ASSISTANT

## 2021-11-25 PROCEDURE — 74011250636 HC RX REV CODE- 250/636: Performed by: ORTHOPAEDIC SURGERY

## 2021-11-25 PROCEDURE — 74011250637 HC RX REV CODE- 250/637: Performed by: ORTHOPAEDIC SURGERY

## 2021-11-25 PROCEDURE — 96376 TX/PRO/DX INJ SAME DRUG ADON: CPT

## 2021-11-25 PROCEDURE — G0378 HOSPITAL OBSERVATION PER HR: HCPCS

## 2021-11-25 RX ADMIN — ONDANSETRON 4 MG: 4 TABLET, ORALLY DISINTEGRATING ORAL at 06:27

## 2021-11-25 RX ADMIN — ONDANSETRON 4 MG: 2 INJECTION INTRAMUSCULAR; INTRAVENOUS at 00:51

## 2021-11-25 RX ADMIN — NITROFURANTOIN MACROCRYSTALS 50 MG: 50 CAPSULE ORAL at 06:27

## 2021-11-25 RX ADMIN — CITALOPRAM HYDROBROMIDE 20 MG: 20 TABLET ORAL at 08:55

## 2021-11-25 RX ADMIN — HYDROCODONE BITARTRATE AND ACETAMINOPHEN 1 TABLET: 10; 325 TABLET ORAL at 09:16

## 2021-11-25 RX ADMIN — NITROFURANTOIN MACROCRYSTALS 50 MG: 50 CAPSULE ORAL at 00:51

## 2021-11-25 RX ADMIN — PANTOPRAZOLE SODIUM 40 MG: 40 TABLET, DELAYED RELEASE ORAL at 06:27

## 2021-11-25 RX ADMIN — Medication 5 ML: at 00:51

## 2021-11-25 RX ADMIN — Medication 5 ML: at 06:29

## 2021-11-25 NOTE — PROGRESS NOTES
Problem: Falls - Risk of  Goal: *Absence of Falls  Description: Document Carmine Haddad Fall Risk and appropriate interventions in the flowsheet.   Outcome: Resolved/Met  Note: Fall Risk Interventions:  Mobility Interventions: Bed/chair exit alarm, OT consult for ADLs, Patient to call before getting OOB, PT Consult for mobility concerns, PT Consult for assist device competence         Medication Interventions: Bed/chair exit alarm, Evaluate medications/consider consulting pharmacy, Patient to call before getting OOB    Elimination Interventions: Call light in reach, Patient to call for help with toileting needs    History of Falls Interventions: Consult care management for discharge planning, Evaluate medications/consider consulting pharmacy         Problem: Patient Education: Go to Patient Education Activity  Goal: Patient/Family Education  Outcome: Resolved/Met     Problem: Patient Education: Go to Patient Education Activity  Goal: Patient/Family Education  Outcome: Resolved/Met     Problem: Urinary Tract Infection - Adult  Goal: *Absence of infection signs and symptoms  Outcome: Resolved/Met     Problem: Patient Education: Go to Patient Education Activity  Goal: Patient/Family Education  Outcome: Resolved/Met     Problem: Patient Education: Go to Patient Education Activity  Goal: Patient/Family Education  Outcome: Resolved/Met     Problem: Upper Extremity Surgical Pathway: Discharge Outcomes  Goal: *Verbalizes name, dosage, time, side effects, and number of days to continue medications  Outcome: Resolved/Met  Goal: *Describes available resources and support systems  Outcome: Resolved/Met  Goal: *Describes follow-up/return visits to physicians  Outcome: Resolved/Met  Goal: *Tolerating diet  Outcome: Resolved/Met  Goal: *Optimal pain control at patient's stated goal  Outcome: Resolved/Met  Goal: *Lungs clear or at baseline  Outcome: Resolved/Met  Goal: *Afebrile  Outcome: Resolved/Met  Goal: *Incision intact without signs of infection, redness, warmth  Outcome: Resolved/Met  Goal: *Absence of deep venous thrombosis signs and symptoms(Stroke Metric)  Outcome: Resolved/Met  Goal: *Active bowel function  Outcome: Resolved/Met  Goal: *Adequate urinary output  Outcome: Resolved/Met  Goal: *Discharge anxiety minimal  Outcome: Resolved/Met  Goal: *Describes available resources and support systems  Outcome: Resolved/Met  Goal: *Labs within defined limits  Outcome: Resolved/Met  Goal: *Hemodynamically stable  Outcome: Resolved/Met  Goal: *Demonstrates ability to don and doff sling/swath/positioning aid  Outcome: Resolved/Met  Goal: *Modified independence with transfers, ambulation on levels with assistance devices, stair climbing, ADL's  Outcome: Resolved/Met  Goal: *Demonstrates independence with home exercise program  Outcome: Resolved/Met     Problem: Pain  Goal: *Control of Pain  Outcome: Resolved/Met     Problem: Patient Education: Go to Patient Education Activity  Goal: Patient/Family Education  Outcome: Resolved/Met

## 2021-11-25 NOTE — PROGRESS NOTES
TRANSFER - OUT REPORT:    Verbal report given to Jenni(name) on Tonio Boyce being transferred to [facility] post acute(unit) for routine post - op       Report consisted of patient's Situation, Background, Assessment and   Recommendations(SBAR). Information from the following report(s) SBAR was reviewed with the receiving nurse. Opportunity for questions and clarification was provided.       Patient transported with:   [facility]

## 2021-11-25 NOTE — PROGRESS NOTES
2021         Post Op day: 6 Days Post-Op   Admit Diagnosis: Open Colles' fracture of left radius [S52.532B]  LAB:    Recent Results (from the past 24 hour(s))   PLEASE READ & DOCUMENT PPD TEST IN 48 HRS    Collection Time: 21  3:43 PM   Result Value Ref Range    PPD Negative Negative    mm Induration 0 0 - 5 mm     Vital Signs:    Patient Vitals for the past 8 hrs:   BP Temp Pulse Resp SpO2   21 0341 133/85 97.6 °F (36.4 °C) 75 18 93 %     Temp (24hrs), Av °F (36.7 °C), Min:97.6 °F (36.4 °C), Max:98.3 °F (36.8 °C)    Pain Control:   Pain Assessment  Pain Scale 1: FLACC  Pain Intensity 1: 0  Pain Onset 1: post fall  Pain Location 1: Foot  Pain Orientation 1: Left  Pain Description 1: Aching  Pain Intervention(s) 1: Medication (see MAR)  Subjective: Doing well, normal recovery experienced. Objective:  No Acute Distress, Alert and Oriented, Neurovascular exam is normal in an ext fix       Assessment:   Patient Active Problem List   Diagnosis Code    Pain in joint, lower leg M25.569    Mixed hyperlipidemia E78.2    Disorder of bone and cartilage, unspecified M89.9, M94.9    Cough R05.9    GERD (gastroesophageal reflux disease) K21.9    Depression F32. 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

## 2021-11-30 VITALS
OXYGEN SATURATION: 96 % | SYSTOLIC BLOOD PRESSURE: 149 MMHG | RESPIRATION RATE: 18 BRPM | WEIGHT: 160 LBS | DIASTOLIC BLOOD PRESSURE: 81 MMHG | HEART RATE: 83 BPM | HEIGHT: 60 IN | BODY MASS INDEX: 31.41 KG/M2 | TEMPERATURE: 97.7 F

## 2021-12-14 PROBLEM — F11.99 OPIOID USE, UNSPECIFIED WITH UNSPECIFIED OPIOID-INDUCED DISORDER (HCC): Status: ACTIVE | Noted: 2021-12-14

## 2022-01-06 NOTE — H&P (VIEW-ONLY)
Consult    Patient: Tamiko Patel MRN: [id number]  SSN: [id number]    YOB: 1945  Age: 68 y.o. Sex: female      Subjective:      Tamiko Patel is a 68 y.o. female who is about 8 weeks out now from her injury she had a left Achilles tendon rupture very comminuted left distal radius and ulna fractures and a left simple elbow dislocation. She was treated with closed reduction of the elbow dislocation closed treatment of the Achilles tendon rupture and I proceeded with irrigation debridement and external fixation of the distal radius fracture. She seemed to be doing remarkably well her elbow feels pretty good she does have some stiffness she is having a little bit of improvement she thinks with her left lower extremity and she has been very compliant with being nonweightbearing on this and wearing her boot.     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    Lichen planus     oral    Mixed hyperlipidemia 3/9/2015    Nonspecific elevation of levels of transaminase or lactic acid dehydrogenase (LDH) 3/9/2015    Osteoarthritis of multiple joints 3/15/2016    Osteoporosis 12/4/2015    Pain in joint, lower leg 3/9/2015    Plantar fascial fibromatosis     Prediabetes 06/22/2016    no current meds, pt doesnt monitor BS    SNHL (sensorineural hearing loss)     Sudden hearing loss of both ears     HA to nicole ears    Tinnitus of both ears      Past Surgical History:   Procedure Laterality Date    HX COLONOSCOPY      HX HYSTERECTOMY      hysterectomy about 30 years ago\"    HX ORTHOPAEDIC Right     wrist surgery    HX TONSILLECTOMY      tonsillectomy at age 21 or 18\"      FAMHX -No history of inflammatory arthritis   Social History     Tobacco Use    Smoking status: Never Smoker    Smokeless tobacco: Never Used   Substance Use Topics    Alcohol use: No     Alcohol/week: 0.0 standard drinks      Current Outpatient Medications   Medication Sig Dispense Refill    ALPRAZolam (Xanax) 0.5 mg tablet Take 1 Tablet by mouth nightly as needed for Anxiety. Max Daily Amount: 0.5 mg. Indications: anxious 30 Tablet 0    citalopram (CELEXA) 20 mg tablet Take 1 Tablet by mouth daily. Indications: major depressive disorder 30 Tablet 0    alendronate (FOSAMAX) 70 mg tablet Take 1 Tablet by mouth every seven (7) days. Indications: decreased bone mass following menopause 4 Tablet 5    acetaminophen (TYLENOL EXTRA STRENGTH PO) Take  by mouth two (2) times a day.  pantoprazole (PROTONIX) 40 mg tablet Take 1 Tablet by mouth daily. 90 Tablet 3    meloxicam (Mobic) 15 mg tablet Take 1 Tab by mouth nightly. 90 Tab 3    pravastatin (PRAVACHOL) 40 mg tablet Take 1 Tab by mouth nightly. 90 Tab 3    fluticasone propionate (FLONASE) 50 mcg/actuation nasal spray 2 Sprays by Both Nostrils route daily. Indications: inflammation of the nose due to an allergy 1 Bottle 3    dexAMETHasone (Decadron) 0.5 mg/5 mL elixir Take  by mouth nightly.  cholecalciferol, vitamin D3, (VITAMIN D3 PO) Take  by mouth daily.  clotrimazole (LOTRIMIN) 1 % topical cream Apply  to affected area two (2) times daily as needed.  CALCIUM CARBONATE/VITAMIN D3 (CALCIUM 600 + D,3, PO) Take 1 Tab by mouth daily.  SENNOSIDES (SENNA LAX PO) Take 1 Tab by mouth daily.  MULTIVITAMIN PO Take 1 Tab by mouth daily.           Allergies   Allergen Reactions    Amoxicillin Diarrhea    Demerol [Meperidine] Nausea and Vomiting       Review of Systems:  A comprehensive review of systems was negative except for that written in the History of Present Illness. Objective: There were no vitals filed for this visit. Physical Exam:  Physical Exam:  General:  Alert, cooperative, no distress, appears stated age. Orientation she is alert and oriented person place time and situation   Eyes:  Conjunctivae/corneas clear. PERRL, EOMs intact. Fundi benign   Ears:  Normal TMs and external ear canals both ears. Nose: Nares normal. Septum midline. Mucosa normal. No drainage or sinus tenderness. Mouth/Throat: Lips, mucosa, and tongue normal. Teeth and gums normal.   Neck: Supple, symmetrical, trachea midline, no adenopathy, thyroid: no enlargment/tenderness/nodules, no carotid bruit and no JVD. Back:   Symmetric, no curvature. ROM normal. No CVA tenderness. Lungs:   Clear to auscultation bilaterally. Heart:  Regular rate and rhythm, S1, S2 normal, no murmur, click, rub or gallop. Abdomen:   Soft, non-tender. Bowel sounds normal. No masses,  No organomegaly. No lymphadenopathy in all 4 extremities  Alignmentshe has near normal alignment of the left wrist in the external fixator  Range of motionshe has no range of motion of the wrist because she is in external fixator  Vasculardistal pulses palpable in left upper extremity  Sensory/motordeep tendon reflexes normal left upper extremity. Motor and sensory function intact. Stabilityno evidence of any instability  Tenderness to palpation throughout the left wrist area  Skinher pin sites are almost pristine  Gait-she is not really able to put weight on her left lower extremity at this time    Assessment:     Hospital Problems  Date Reviewed: 12/15/2021    None        #1left distal radius extra-articular fracture with bone defect and nonunion    Xrays and or studies:    Indicationsfollow-up left distal radius fracture, findingsAP views the left wrist shows a left distal radius fracture. The reasonly well aligned on both the AP and lateral projection.   There does appear to be this large bony defect most notably over the volar and radial aspect of the distal radius this appears to be mostly extra-articular. There is a large defect and no evidence of healing at this early point time impression well aligned left distal radius fracture with no evidence of healing    Indicationfollow-up left elbow dislocation, findingsAP and lateral views of the left elbow shows the ulnohumeral radiocapitellar joints are concentrically reduced and well aligned impression well aligned left elbow dislocation  Plan:     I have spoke with the patient regarding different treatment options. After talking her about different things we have elected to go ahead and proceed with remove the external fixator and treatment with plate fixation of the distal radius with bone graft I would like to use Sury bone graft I gone through with her the risks and benefits associated with this as I think this would be a better choice for her than autograft. I have explained that I may mix a little bit of autograft in this from her olecranon area and she understands this.   So the plan will return to the operating room remove the external fixator and volar plate fixation with bone grafting of the left distal radius fracture within the next week or so    Signed By: Conchita Ayers MD     January 6, 2022

## 2022-01-06 NOTE — H&P
Consult    Patient: Deniz Mcpherson MRN: [id number]  SSN: [id number]    YOB: 1945  Age: 68 y.o. Sex: female      Subjective:      Deniz Mcpherson is a 68 y.o. female who is about 8 weeks out now from her injury she had a left Achilles tendon rupture very comminuted left distal radius and ulna fractures and a left simple elbow dislocation. She was treated with closed reduction of the elbow dislocation closed treatment of the Achilles tendon rupture and I proceeded with irrigation debridement and external fixation of the distal radius fracture. She seemed to be doing remarkably well her elbow feels pretty good she does have some stiffness she is having a little bit of improvement she thinks with her left lower extremity and she has been very compliant with being nonweightbearing on this and wearing her boot.     Past Medical History:   Diagnosis Date    Allergic rhinitis     Chronic pain     left hip pain- a lot better    Depression     Deviated septum     Disorder of bone and cartilage, unspecified 3/9/2015    Generalized anxiety disorder     GERD (gastroesophageal reflux disease)     on meication for this    History of hand fracture 2004    Intestinal disaccharidase deficiencies and disaccharide malabsorption 9/7/6621    Lichen planus     oral    Mixed hyperlipidemia 3/9/2015    Nonspecific elevation of levels of transaminase or lactic acid dehydrogenase (LDH) 3/9/2015    Osteoarthritis of multiple joints 3/15/2016    Osteoporosis 12/4/2015    Pain in joint, lower leg 3/9/2015    Plantar fascial fibromatosis     Prediabetes 06/22/2016    no current meds, pt doesnt monitor BS    SNHL (sensorineural hearing loss)     Sudden hearing loss of both ears     HA to nicole ears    Tinnitus of both ears      Past Surgical History:   Procedure Laterality Date    HX COLONOSCOPY      HX HYSTERECTOMY      hysterectomy about 30 years ago\"    HX ORTHOPAEDIC Right     wrist surgery    HX TONSILLECTOMY      tonsillectomy at age 21 or 18\"      FAMHX -No history of inflammatory arthritis   Social History     Tobacco Use    Smoking status: Never Smoker    Smokeless tobacco: Never Used   Substance Use Topics    Alcohol use: No     Alcohol/week: 0.0 standard drinks      Current Outpatient Medications   Medication Sig Dispense Refill    ALPRAZolam (Xanax) 0.5 mg tablet Take 1 Tablet by mouth nightly as needed for Anxiety. Max Daily Amount: 0.5 mg. Indications: anxious 30 Tablet 0    citalopram (CELEXA) 20 mg tablet Take 1 Tablet by mouth daily. Indications: major depressive disorder 30 Tablet 0    alendronate (FOSAMAX) 70 mg tablet Take 1 Tablet by mouth every seven (7) days. Indications: decreased bone mass following menopause 4 Tablet 5    acetaminophen (TYLENOL EXTRA STRENGTH PO) Take  by mouth two (2) times a day.  pantoprazole (PROTONIX) 40 mg tablet Take 1 Tablet by mouth daily. 90 Tablet 3    meloxicam (Mobic) 15 mg tablet Take 1 Tab by mouth nightly. 90 Tab 3    pravastatin (PRAVACHOL) 40 mg tablet Take 1 Tab by mouth nightly. 90 Tab 3    fluticasone propionate (FLONASE) 50 mcg/actuation nasal spray 2 Sprays by Both Nostrils route daily. Indications: inflammation of the nose due to an allergy 1 Bottle 3    dexAMETHasone (Decadron) 0.5 mg/5 mL elixir Take  by mouth nightly.  cholecalciferol, vitamin D3, (VITAMIN D3 PO) Take  by mouth daily.  clotrimazole (LOTRIMIN) 1 % topical cream Apply  to affected area two (2) times daily as needed.  CALCIUM CARBONATE/VITAMIN D3 (CALCIUM 600 + D,3, PO) Take 1 Tab by mouth daily.  SENNOSIDES (SENNA LAX PO) Take 1 Tab by mouth daily.  MULTIVITAMIN PO Take 1 Tab by mouth daily.           Allergies   Allergen Reactions    Amoxicillin Diarrhea    Demerol [Meperidine] Nausea and Vomiting       Review of Systems:  A comprehensive review of systems was negative except for that written in the History of Present Illness. Objective: There were no vitals filed for this visit. Physical Exam:  Physical Exam:  General:  Alert, cooperative, no distress, appears stated age. Orientation she is alert and oriented person place time and situation   Eyes:  Conjunctivae/corneas clear. PERRL, EOMs intact. Fundi benign   Ears:  Normal TMs and external ear canals both ears. Nose: Nares normal. Septum midline. Mucosa normal. No drainage or sinus tenderness. Mouth/Throat: Lips, mucosa, and tongue normal. Teeth and gums normal.   Neck: Supple, symmetrical, trachea midline, no adenopathy, thyroid: no enlargment/tenderness/nodules, no carotid bruit and no JVD. Back:   Symmetric, no curvature. ROM normal. No CVA tenderness. Lungs:   Clear to auscultation bilaterally. Heart:  Regular rate and rhythm, S1, S2 normal, no murmur, click, rub or gallop. Abdomen:   Soft, non-tender. Bowel sounds normal. No masses,  No organomegaly. No lymphadenopathy in all 4 extremities  Alignment-she has near normal alignment of the left wrist in the external fixator  Range of motion-she has no range of motion of the wrist because she is in external fixator  Vascular-distal pulses palpable in left upper extremity  Sensory/motor-deep tendon reflexes normal left upper extremity. Motor and sensory function intact. Stability-no evidence of any instability  Tenderness to palpation throughout the left wrist area  Skin-her pin sites are almost pristine  Gait-she is not really able to put weight on her left lower extremity at this time    Assessment:     Hospital Problems  Date Reviewed: 12/15/2021    None        #1-left distal radius extra-articular fracture with bone defect and nonunion    Xrays and or studies:    [id number] left distal radius fracture, findings-AP views the left wrist shows a left distal radius fracture. The reasonly well aligned on both the AP and lateral projection.   There does appear to be this large bony defect most notably over the volar and radial aspect of the distal radius this appears to be mostly extra-articular. There is a large defect and no evidence of healing at this early point time impression well aligned left distal radius fracture with no evidence of healing    [id number] left elbow dislocation, findings-AP and lateral views of the left elbow shows the ulnohumeral radiocapitellar joints are concentrically reduced and well aligned impression well aligned left elbow dislocation  Plan:     I have spoke with the patient regarding different treatment options. After talking her about different things we have elected to go ahead and proceed with remove the external fixator and treatment with plate fixation of the distal radius with bone graft I would like to use Sury bone graft I gone through with her the risks and benefits associated with this as I think this would be a better choice for her than autograft. I have explained that I may mix a little bit of autograft in this from her olecranon area and she understands this.   So the plan will return to the operating room remove the external fixator and volar plate fixation with bone grafting of the left distal radius fracture within the next week or so    Signed By: Tonio Regalado MD     January 6, 2022

## 2022-01-11 ENCOUNTER — ANESTHESIA EVENT (OUTPATIENT)
Dept: SURGERY | Age: 77
End: 2022-01-11
Payer: MEDICARE

## 2022-01-12 ENCOUNTER — HOSPITAL ENCOUNTER (OUTPATIENT)
Age: 77
Setting detail: OUTPATIENT SURGERY
Discharge: HOME | End: 2022-01-12
Attending: ORTHOPAEDIC SURGERY | Admitting: ORTHOPAEDIC SURGERY
Payer: MEDICARE

## 2022-01-12 ENCOUNTER — ANESTHESIA (OUTPATIENT)
Dept: SURGERY | Age: 77
End: 2022-01-12
Payer: MEDICARE

## 2022-01-12 ENCOUNTER — APPOINTMENT (OUTPATIENT)
Dept: GENERAL RADIOLOGY | Age: 77
End: 2022-01-12
Attending: ORTHOPAEDIC SURGERY
Payer: MEDICARE

## 2022-01-12 VITALS
RESPIRATION RATE: 16 BRPM | OXYGEN SATURATION: 92 % | HEART RATE: 77 BPM | BODY MASS INDEX: 29.84 KG/M2 | SYSTOLIC BLOOD PRESSURE: 153 MMHG | DIASTOLIC BLOOD PRESSURE: 71 MMHG | HEIGHT: 60 IN | WEIGHT: 152 LBS | TEMPERATURE: 98 F

## 2022-01-12 DIAGNOSIS — S52.532S: ICD-10-CM

## 2022-01-12 DIAGNOSIS — S52.532B TYPE I OR II OPEN COLLES' FRACTURE OF LEFT RADIUS, INITIAL ENCOUNTER: Primary | ICD-10-CM

## 2022-01-12 LAB
ABO + RH BLD: NORMAL
BLOOD GROUP ANTIBODIES SERPL: NORMAL
COVID-19 RAPID TEST, COVR: NOT DETECTED
SOURCE, COVRS: NORMAL
SPECIMEN EXP DATE BLD: NORMAL

## 2022-01-12 PROCEDURE — C1713 ANCHOR/SCREW BN/BN,TIS/BN: HCPCS | Performed by: ORTHOPAEDIC SURGERY

## 2022-01-12 PROCEDURE — 77030034094 HC GRFT BN SUB ELITE TRNTY MUSC -I1: Performed by: ORTHOPAEDIC SURGERY

## 2022-01-12 PROCEDURE — 77030017016 HC DSG ANTIMIC BARR2 S&N -B: Performed by: ORTHOPAEDIC SURGERY

## 2022-01-12 PROCEDURE — 74011250636 HC RX REV CODE- 250/636: Performed by: NURSE ANESTHETIST, CERTIFIED REGISTERED

## 2022-01-12 PROCEDURE — 76010000161 HC OR TIME 1 TO 1.5 HR INTENSV-TIER 1: Performed by: ORTHOPAEDIC SURGERY

## 2022-01-12 PROCEDURE — 77030020269 HC MISC IMPL: Performed by: ORTHOPAEDIC SURGERY

## 2022-01-12 PROCEDURE — 77030033067 HC SUT PDO STRATFX SPIR J&J -B: Performed by: ORTHOPAEDIC SURGERY

## 2022-01-12 PROCEDURE — 74011250636 HC RX REV CODE- 250/636: Performed by: ANESTHESIOLOGY

## 2022-01-12 PROCEDURE — 77030000031 HC BIT DRL QC SYNT -C: Performed by: ORTHOPAEDIC SURGERY

## 2022-01-12 PROCEDURE — 76210000006 HC OR PH I REC 0.5 TO 1 HR: Performed by: ORTHOPAEDIC SURGERY

## 2022-01-12 PROCEDURE — 74011250637 HC RX REV CODE- 250/637: Performed by: ANESTHESIOLOGY

## 2022-01-12 PROCEDURE — 77030018843 HC SOL IRR SOD CL 9% SLSH BAXT -B: Performed by: ORTHOPAEDIC SURGERY

## 2022-01-12 PROCEDURE — 77030008462 HC STPLR SKN PROX J&J -A: Performed by: ORTHOPAEDIC SURGERY

## 2022-01-12 PROCEDURE — 77030003602 HC NDL NRV BLK BBMI -B: Performed by: NURSE ANESTHETIST, CERTIFIED REGISTERED

## 2022-01-12 PROCEDURE — 74011000250 HC RX REV CODE- 250: Performed by: ANESTHESIOLOGY

## 2022-01-12 PROCEDURE — 76010010054 HC POST OP PAIN BLOCK: Performed by: ORTHOPAEDIC SURGERY

## 2022-01-12 PROCEDURE — 77030033681 HC SPLNT P-CUT SAF BSNM -A: Performed by: ORTHOPAEDIC SURGERY

## 2022-01-12 PROCEDURE — 76942 ECHO GUIDE FOR BIOPSY: CPT | Performed by: ORTHOPAEDIC SURGERY

## 2022-01-12 PROCEDURE — 74011000250 HC RX REV CODE- 250: Performed by: NURSE ANESTHETIST, CERTIFIED REGISTERED

## 2022-01-12 PROCEDURE — 86900 BLOOD TYPING SEROLOGIC ABO: CPT

## 2022-01-12 PROCEDURE — 77030000032 HC CUF TRNQT ZIMM -B: Performed by: ORTHOPAEDIC SURGERY

## 2022-01-12 PROCEDURE — 73100 X-RAY EXAM OF WRIST: CPT

## 2022-01-12 PROCEDURE — 76210000021 HC REC RM PH II 0.5 TO 1 HR: Performed by: ORTHOPAEDIC SURGERY

## 2022-01-12 PROCEDURE — 74011250636 HC RX REV CODE- 250/636: Performed by: ORTHOPAEDIC SURGERY

## 2022-01-12 PROCEDURE — 76060000033 HC ANESTHESIA 1 TO 1.5 HR: Performed by: ORTHOPAEDIC SURGERY

## 2022-01-12 PROCEDURE — 87635 SARS-COV-2 COVID-19 AMP PRB: CPT

## 2022-01-12 PROCEDURE — 2709999900 HC NON-CHARGEABLE SUPPLY: Performed by: ORTHOPAEDIC SURGERY

## 2022-01-12 DEVICE — SCREW BNE L12MM DIA2.7MM ANK S STL ST VAR ANG LOK FULL THRD: Type: IMPLANTABLE DEVICE | Site: WRIST | Status: FUNCTIONAL

## 2022-01-12 DEVICE — 2.4MM VA LOCKING SCREW STARDRIVE 18MM: Type: IMPLANTABLE DEVICE | Site: WRIST | Status: FUNCTIONAL

## 2022-01-12 DEVICE — IMPLANTABLE DEVICE: Type: IMPLANTABLE DEVICE | Site: WRIST | Status: FUNCTIONAL

## 2022-01-12 DEVICE — GRAFT BNE SUB M CANC FRZN MORSELIZED W/ VIABLE CELL TRINITY: Type: IMPLANTABLE DEVICE | Site: WRIST | Status: FUNCTIONAL

## 2022-01-12 DEVICE — SCREW BNE L22MM DIA2.4MM DST RAD VOLAR S STL ST VAR ANG LOK: Type: IMPLANTABLE DEVICE | Site: WRIST | Status: FUNCTIONAL

## 2022-01-12 DEVICE — 2.7MM CORTEX SCREW SLF-TPNG WITH T8 STARDRIVE RECESS 16MM: Type: IMPLANTABLE DEVICE | Site: WRIST | Status: FUNCTIONAL

## 2022-01-12 RX ORDER — ROPIVACAINE HYDROCHLORIDE 5 MG/ML
INJECTION, SOLUTION EPIDURAL; INFILTRATION; PERINEURAL
Status: COMPLETED | OUTPATIENT
Start: 2022-01-12 | End: 2022-01-12

## 2022-01-12 RX ORDER — NALOXONE HYDROCHLORIDE 0.4 MG/ML
0.1 INJECTION, SOLUTION INTRAMUSCULAR; INTRAVENOUS; SUBCUTANEOUS AS NEEDED
Status: DISCONTINUED | OUTPATIENT
Start: 2022-01-12 | End: 2022-01-12 | Stop reason: HOSPADM

## 2022-01-12 RX ORDER — PROPOFOL 10 MG/ML
INJECTION, EMULSION INTRAVENOUS
Status: DISCONTINUED | OUTPATIENT
Start: 2022-01-12 | End: 2022-01-12 | Stop reason: HOSPADM

## 2022-01-12 RX ORDER — FLUMAZENIL 0.1 MG/ML
0.2 INJECTION INTRAVENOUS
Status: DISCONTINUED | OUTPATIENT
Start: 2022-01-12 | End: 2022-01-12 | Stop reason: HOSPADM

## 2022-01-12 RX ORDER — OXYCODONE AND ACETAMINOPHEN 5; 325 MG/1; MG/1
2 TABLET ORAL
Qty: 60 TABLET | Refills: 0 | Status: SHIPPED | OUTPATIENT
Start: 2022-01-12 | End: 2022-01-17

## 2022-01-12 RX ORDER — CEFAZOLIN SODIUM/WATER 2 G/20 ML
2 SYRINGE (ML) INTRAVENOUS ONCE
Status: COMPLETED | OUTPATIENT
Start: 2022-01-12 | End: 2022-01-12

## 2022-01-12 RX ORDER — DEXAMETHASONE SODIUM PHOSPHATE 4 MG/ML
INJECTION, SOLUTION INTRA-ARTICULAR; INTRALESIONAL; INTRAMUSCULAR; INTRAVENOUS; SOFT TISSUE
Status: COMPLETED | OUTPATIENT
Start: 2022-01-12 | End: 2022-01-12

## 2022-01-12 RX ORDER — LIDOCAINE HYDROCHLORIDE 20 MG/ML
INJECTION, SOLUTION EPIDURAL; INFILTRATION; INTRACAUDAL; PERINEURAL
Status: COMPLETED | OUTPATIENT
Start: 2022-01-12 | End: 2022-01-12

## 2022-01-12 RX ORDER — OXYCODONE HYDROCHLORIDE 5 MG/1
5 TABLET ORAL
Status: DISCONTINUED | OUTPATIENT
Start: 2022-01-12 | End: 2022-01-12 | Stop reason: HOSPADM

## 2022-01-12 RX ORDER — PROPOFOL 10 MG/ML
INJECTION, EMULSION INTRAVENOUS AS NEEDED
Status: DISCONTINUED | OUTPATIENT
Start: 2022-01-12 | End: 2022-01-12 | Stop reason: HOSPADM

## 2022-01-12 RX ORDER — SODIUM CHLORIDE, SODIUM LACTATE, POTASSIUM CHLORIDE, CALCIUM CHLORIDE 600; 310; 30; 20 MG/100ML; MG/100ML; MG/100ML; MG/100ML
100 INJECTION, SOLUTION INTRAVENOUS CONTINUOUS
Status: DISCONTINUED | OUTPATIENT
Start: 2022-01-12 | End: 2022-01-12 | Stop reason: HOSPADM

## 2022-01-12 RX ORDER — FENTANYL CITRATE 50 UG/ML
100 INJECTION, SOLUTION INTRAMUSCULAR; INTRAVENOUS AS NEEDED
Status: COMPLETED | OUTPATIENT
Start: 2022-01-12 | End: 2022-01-12

## 2022-01-12 RX ORDER — EPHEDRINE SULFATE/0.9% NACL/PF 50 MG/5 ML
SYRINGE (ML) INTRAVENOUS AS NEEDED
Status: DISCONTINUED | OUTPATIENT
Start: 2022-01-12 | End: 2022-01-12 | Stop reason: HOSPADM

## 2022-01-12 RX ORDER — ACETAMINOPHEN 500 MG
1000 TABLET ORAL ONCE
Status: COMPLETED | OUTPATIENT
Start: 2022-01-12 | End: 2022-01-12

## 2022-01-12 RX ORDER — LIDOCAINE HYDROCHLORIDE 10 MG/ML
0.1 INJECTION INFILTRATION; PERINEURAL AS NEEDED
Status: DISCONTINUED | OUTPATIENT
Start: 2022-01-12 | End: 2022-01-12 | Stop reason: HOSPADM

## 2022-01-12 RX ORDER — MIDAZOLAM HYDROCHLORIDE 1 MG/ML
2 INJECTION, SOLUTION INTRAMUSCULAR; INTRAVENOUS ONCE
Status: COMPLETED | OUTPATIENT
Start: 2022-01-12 | End: 2022-01-12

## 2022-01-12 RX ORDER — SODIUM CHLORIDE, SODIUM LACTATE, POTASSIUM CHLORIDE, CALCIUM CHLORIDE 600; 310; 30; 20 MG/100ML; MG/100ML; MG/100ML; MG/100ML
75 INJECTION, SOLUTION INTRAVENOUS CONTINUOUS
Status: DISCONTINUED | OUTPATIENT
Start: 2022-01-12 | End: 2022-01-12 | Stop reason: HOSPADM

## 2022-01-12 RX ORDER — DIPHENHYDRAMINE HYDROCHLORIDE 50 MG/ML
12.5 INJECTION, SOLUTION INTRAMUSCULAR; INTRAVENOUS
Status: DISCONTINUED | OUTPATIENT
Start: 2022-01-12 | End: 2022-01-12 | Stop reason: HOSPADM

## 2022-01-12 RX ORDER — HYDROMORPHONE HYDROCHLORIDE 2 MG/ML
0.5 INJECTION, SOLUTION INTRAMUSCULAR; INTRAVENOUS; SUBCUTANEOUS
Status: DISCONTINUED | OUTPATIENT
Start: 2022-01-12 | End: 2022-01-12 | Stop reason: HOSPADM

## 2022-01-12 RX ORDER — LIDOCAINE HYDROCHLORIDE 20 MG/ML
INJECTION, SOLUTION EPIDURAL; INFILTRATION; INTRACAUDAL; PERINEURAL AS NEEDED
Status: DISCONTINUED | OUTPATIENT
Start: 2022-01-12 | End: 2022-01-12 | Stop reason: HOSPADM

## 2022-01-12 RX ADMIN — FENTANYL CITRATE 50 MCG: 50 INJECTION INTRAMUSCULAR; INTRAVENOUS at 08:00

## 2022-01-12 RX ADMIN — PROPOFOL 140 MCG/KG/MIN: 10 INJECTION, EMULSION INTRAVENOUS at 08:29

## 2022-01-12 RX ADMIN — ROPIVACAINE HYDROCHLORIDE 15 ML: 150 INJECTION, SOLUTION EPIDURAL; INFILTRATION; PERINEURAL at 08:00

## 2022-01-12 RX ADMIN — ACETAMINOPHEN 1000 MG: 500 TABLET ORAL at 08:07

## 2022-01-12 RX ADMIN — MIDAZOLAM 2 MG: 1 INJECTION INTRAMUSCULAR; INTRAVENOUS at 08:00

## 2022-01-12 RX ADMIN — DEXAMETHASONE SODIUM PHOSPHATE 4 MG: 4 INJECTION, SOLUTION INTRAMUSCULAR; INTRAVENOUS at 08:00

## 2022-01-12 RX ADMIN — LIDOCAINE HYDROCHLORIDE 60 MG: 20 INJECTION, SOLUTION EPIDURAL; INFILTRATION; INTRACAUDAL; PERINEURAL at 08:29

## 2022-01-12 RX ADMIN — Medication 2 G: at 08:32

## 2022-01-12 RX ADMIN — PROPOFOL 50 MG: 10 INJECTION, EMULSION INTRAVENOUS at 08:29

## 2022-01-12 RX ADMIN — LIDOCAINE HYDROCHLORIDE 10 ML: 20 INJECTION, SOLUTION EPIDURAL; INFILTRATION; INTRACAUDAL; PERINEURAL at 08:00

## 2022-01-12 RX ADMIN — SODIUM CHLORIDE, SODIUM LACTATE, POTASSIUM CHLORIDE, AND CALCIUM CHLORIDE 100 ML/HR: 600; 310; 30; 20 INJECTION, SOLUTION INTRAVENOUS at 08:07

## 2022-01-12 RX ADMIN — Medication 10 MG: at 09:09

## 2022-01-12 NOTE — DISCHARGE INSTRUCTIONS
Cloteal López ACTIVITY AFTER DISCHARGE Patient should: Restrict lifting   Patient should:   Restrict lifting      ADULT DIET Regular   Primary Diet:   Regular      DRESSING, DO NOT REMOVE   Keep clean, dry and intact until next clinic visit. MEDICATION INTERACTION:  During your procedure you potentially received a medication or medications which may reduce the effectiveness of oral contraceptives. Please consider other forms of contraception for 1 month following your procedure if you are currently using oral contraceptives as your primary form of birth control. In addition to this, we recommend continuing your oral contraceptive as prescribed, unless otherwise instructed by your physician, during this time    After general anesthesia or intravenous sedation, for 24 hours or while taking prescription Narcotics:  · Limit your activities  · A responsible adult needs to be with you for the next 24 hours  · Do not drive and operate hazardous machinery  · Do not make important personal or business decisions  · Do not drink alcoholic beverages  · If you have not urinated within 8 hours after discharge, and you are experiencing discomfort from urinary retention, please go to the nearest ED. · If you have sleep apnea and have a CPAP machine, please use it for all naps and sleeping. · Please use caution when taking narcotics and any of your home medications that may cause drowsiness. *  Please give a list of your current medications to your Primary Care Provider. *  Please update this list whenever your medications are discontinued, doses are      changed, or new medications (including over-the-counter products) are added. *  Please carry medication information at all times in case of emergency situations.     These are general instructions for a healthy lifestyle:  No smoking/ No tobacco products/ Avoid exposure to second hand smoke  Surgeon General's Warning:  Quitting smoking now greatly reduces serious risk to your health. Obesity, smoking, and sedentary lifestyle greatly increases your risk for illness  A healthy diet, regular physical exercise & weight monitoring are important for maintaining a healthy lifestyle    You may be retaining fluid if you have a history of heart failure or if you experience any of the following symptoms:  Weight gain of 3 pounds or more overnight or 5 pounds in a week, increased swelling in our hands or feet or shortness of breath while lying flat in bed. Please call your doctor as soon as you notice any of these symptoms; do not wait until your next office visit.

## 2022-01-12 NOTE — ANESTHESIA PROCEDURE NOTES
Peripheral Block    Start time: 1/12/2022 8:00 AM  End time: 1/12/2022 8:04 AM  Performed by: Elle Warren MD  Authorized by: Elle Warren MD       Pre-procedure:    Indications: at surgeon's request and post-op pain management    Preanesthetic Checklist: patient identified, risks and benefits discussed, site marked, timeout performed, anesthesia consent given and patient being monitored    Timeout Time: 08:00 EST          Block Type:   Block Type:  Supraclavicular  Laterality:  Left  Monitoring:  Standard ASA monitoring, continuous pulse ox, heart rate, responsive to questions, oxygen and frequent vital sign checks  Injection Technique:  Single shot  Procedures: ultrasound guided    Patient Position: seated  Prep: chlorhexidine    Needle Type:  Stimuplex  Needle Gauge:  20 G  Needle Localization:  Ultrasound guidance  Medication Injected:  Ropivacaine (PF) (NAROPIN)(0.5%) 5 mg/mL injection, 15 mL  dexamethasone (DECADRON) 4 mg/mL injection, 4 mg  lidocaine (XYLOCAINE) Preserv-Free 2% injection, 10 mL  Med Admin Time: 1/12/2022 8:00 AM    Assessment:  Number of attempts:  1  Injection Assessment:  Incremental injection every 5 mL, local visualized surrounding nerve on ultrasound, negative aspiration for blood, negative aspiration for CSF, no paresthesia, no intravascular symptoms and ultrasound image on chart  Patient tolerance:  Patient tolerated the procedure well with no immediate complications

## 2022-01-12 NOTE — PERIOP NOTES
Discharge instructions given to pt and . Both verbalize understand and all questions answered.
XR called at this time.
Detail Level: Detailed

## 2022-01-12 NOTE — ANESTHESIA POSTPROCEDURE EVALUATION
Procedure(s):  LEFT WRIST OPEN REDUCTION INTERNAL FIXATION WITH REMOVAL OF EX FIX CHOICE ANES. total IV anesthesia    Anesthesia Post Evaluation      Multimodal analgesia: multimodal analgesia used between 6 hours prior to anesthesia start to PACU discharge  Patient location during evaluation: PACU  Patient participation: complete - patient participated  Level of consciousness: awake and alert  Pain management: adequate  Airway patency: patent  Anesthetic complications: no  Cardiovascular status: acceptable  Respiratory status: acceptable  Hydration status: acceptable  Post anesthesia nausea and vomiting:  controlled  Final Post Anesthesia Temperature Assessment:  Normothermia (36.0-37.5 degrees C)      INITIAL Post-op Vital signs:   Vitals Value Taken Time   /68 01/12/22 1015   Temp 36.7 °C (98 °F) 01/12/22 1015   Pulse 77 01/12/22 1015   Resp 15 01/12/22 1014   SpO2 92 % 01/12/22 1015   Vitals shown include unvalidated device data.

## 2022-01-12 NOTE — ANESTHESIA PREPROCEDURE EVALUATION
Relevant Problems   RESPIRATORY SYSTEM   (+) Shortness of breath      NEUROLOGY   (+) Depression   (+) Mild episode of recurrent major depressive disorder (HCC)      GASTROINTESTINAL   (+) GERD (gastroesophageal reflux disease)       Anesthetic History   No history of anesthetic complications            Review of Systems / Medical History  Patient summary reviewed and pertinent labs reviewed    Pulmonary  Within defined limits                 Neuro/Psych         Psychiatric history     Cardiovascular              Hyperlipidemia    Exercise tolerance: >4 METS     GI/Hepatic/Renal     GERD: well controlled           Endo/Other        Obesity and arthritis     Other Findings   Comments: H/o closed reduction and ex-fix to [facility] for removal and repair            Physical Exam    Airway  Mallampati: II  TM Distance: 4 - 6 cm  Neck ROM: normal range of motion   Mouth opening: Normal     Cardiovascular    Rhythm: regular  Rate: normal         Dental    Dentition: Implants     Pulmonary  Breath sounds clear to auscultation               Abdominal  GI exam deferred       Other Findings            Anesthetic Plan    ASA: 2  Anesthesia type: total IV anesthesia      Post-op pain plan if not by surgeon: peripheral nerve block single    Induction: Intravenous  Anesthetic plan and risks discussed with: Patient and Spouse

## 2022-01-12 NOTE — INTERVAL H&P NOTE
Update History & Physical    The Patient's History and Physical of 1/6/2022 was reviewed with the patient and I examined the patient. There was no change. The surgical site was confirmed by the patient and me. Plan:  The risk, benefits, expected outcome, and alternative to the recommended procedure have been discussed with the patient. Patient understands and wants to proceed with remove the external fixator and volar plate fixation with bone grafting of the left distal radius fracture.     Electronically signed by Marita Lombardo MD on 1/12/2022 at 7:24 AM

## 2022-01-12 NOTE — OP NOTES
Operative Report    Patient: Joel Rogers MRN: [id number]  SSN: [id number]    YOB: 1945  Age: 68 y.o. Sex: female       Date of Surgery: 1/12/2022     History:  Joel Rogers is a 68 y.o. female who had an accident several weeks ago approximately 6 weeks prior in which she suffered really 3 injuries she had a left elbow dislocation a very serious open distal radius and ulna fracture as well as a left Achilles tendon rupture. The elbow dislocation was treated with just closed reduction and this proved to be very stable Achilles tendon rupture was treated nonoperatively and she has been in first a cast and now boot for this. And she was placed in the external fixator after irrigation debridement of her open fracture over her left wrist.  She does have an area that appears to be segmental defect in her distal radius and she still has some residual volar translation of her distal fragment on the radial shaft as result of these 2 things I talked to her about plate fixation and bone grafting I felt by plate fixation we could really get her radius much more well aligned with plate screw fixation and because she has this large defect in the metaphysis of her distal radius I felt that treating this with bone grafting would also help this heal and hopefully heal uneventfully. So after talking her about fixator removal and plate and screw fixation of her distal radius fracture she seemed understand and wished to proceed      I talked to the patient and/or their representative and explained the exact nature the procedure. I also went through a detailed list of the material risks associated with  the procedure which included risk of bleeding, infection, injury to nearby structures, worsening the situation, as well as the risks associate with anesthesia and finally death. Also talked with him regarding the benefits and alternatives to the procedure.     Preoperative Diagnosis: Type I or II open Colles' fracture of left radius, initial encounter [S56.639B]     Postoperative Diagnosis: Type I or II open Colles' fracture of left radius, initial encounter [R64.151Y]     Surgeon(s) and Role:     * Neita Phoenix, MD - Primary    Anesthesia: General     Procedure: Procedure(s):  #1 removal of external fixator left wrist #2 staged treatment of left extra-articular distal radius fracture with plate and screw fixation and allograft bone grafting  Procedure in Detail: After successful duction of general anesthetic as well as a regional block for postoperative analgesia the left upper extremity was examined doing remove the external fixator from her left arm after performing a timeout. Once the external fixator was removed I did hold pressure on the pin sites for a few minutes just to make sure there was no evidence of any ongoing oozing or bleeding and once this was treated we then prepped and draped the left upper extremity. The tourniquet was then inflated 250 mmHg I made a standard volar approach to the distal radius and radial shaft. I exposed the distal portion of the radius going through the floor the flexor carpi radialis tendon sheath after this was exposed I incised the pronator quadratus from the radial aspect of the distal radius and reflected this ulnarly and this exposed the metaphyseal defect. At that point I then used a rongeur as well as a curette to remove some of the fibrous tissue from the defect in the distal radius. There was actually some dorsal cortex that was still present but there was a large defect measuring approximately 2 cm in the volar aspect of the distal radius. There was also some residual volar translation of the distal fragment as well.   Once I had the fibrous tissue taken out of the nonunion site and the fracture was well mobilized I then placed my Synthes metadiaphyseal plate along the volar aspect of the distal radius and then attach this with a single screw distally. Once the locking screw was in place I then  the position of my plate on the distal radius I did adjust this slightly and replaced my screw. At that point I was very happy with the overall alignment of the plate on the distal fragment I did add an additional locking screw. I then used a reduction forcep to help lined up the radial shaft with the plate as well and then placed a single cortical screw in the shaft portion of the plate which nicely approximated the bone to the plate and reduce the residual volar displacement of the distal fragment. At that point I was very pleased with the plate position as well as the overall alignment of my radial fracture I then just began adding additional locking screws both distally as well as proximally so I had 4 locking screws distally and 4 locking screws in the shaft portion of the plate. I did remove the original cortical screw from the proximal fragment as this was a screw that was placed intentionally a little too long to make sure that I was able to draw the plate into good position with the radial shaft so after this was removed I then  the overall alignment I was very pleased with this. I then cleaned out the nonunion site once again used a 1.8 mm drill bit just to decorticate some of the bone just distal and proximal to the bony defect and then placed approximately 5 to 6 cc of Sury allograft bone graft into the bony defect. Once this was in place I then began the process of wound closure. I should mention that I had irrigated prior to placement of my bone graft that would not have to irrigate after the bone graft was placed. I then dropped the tourniquet and held pressure for a few minutes to make sure there was no evidence of any active bleeding and once I was assured this I then closed the subcutaneous tissue with two-point oh strata fix suture and the skin was closed with staples.   Dressings were then applied as well as a volar splint. I did take a final AP and lateral mini C arm views just to make sure I was happy with the overall alignment and saved this image as well. After the dressings and volar splint applied the patient was awakened and taken recovery in stable condition      Estimated Blood Loss: 90 cc    Tourniquet Time:   Total Tourniquet Time Documented:  Upper Arm (Left) - 35 minutes  Total: Upper Arm (Left) - 35 minutes        Implants:   Implant Name Type Inv. Item Serial No.  Lot No. LRB No. Used Action   2.4/2.7 VA-LCP 2-C DRP NRW 6H HD/7H SHAFT/105MM/LT-STER - [id number]  2.4/2.7 VA-LCP 2-C DRP NRW 6H HD/7H SHAFT/105MM/LT-STER  DEPU[employer] [facility]_ [id number] Left 1 Implanted   [employer] --  - [id number]  [employer] --  [id number] [employer]_  Left 1 Implanted               Specimens: * No specimens in log *        Drains: None                Complications: None    Counts: Sponge and needle counts were correct times two.     Signed By:  Yudith Alvarez MD     January 12, 2022

## 2022-02-17 ENCOUNTER — HOSPITAL ENCOUNTER (OUTPATIENT)
Age: 77
Setting detail: OUTPATIENT SURGERY
End: 2022-02-17
Attending: ORTHOPAEDIC SURGERY | Admitting: ORTHOPAEDIC SURGERY

## 2022-02-18 NOTE — H&P
History and physical    Patient: Ira Shen MRN: [id number]  SSN: [id number]    YOB: 1945  Age: 68 y.o. Sex: female      Subjective:      Ira Shen is a 68 y.o. female who is about 6 weeks out from her most recent surgical intervention which was plate and screw fixation and bone grafting of a left distal radius fracture that had a segmental defect. She is also about 3 months out from her original injury which included a left elbow dislocation which was treated with just closed reduction and she originally had external fixation of her open distal radius and ulna fractures and she also had a left Achilles tendon rupture. She is doing pretty well with her elbow is just has some stiffness. Her wrist is coming along but she really is complaining mostly of an area over her ulna where the bone is very prominent and it appears to be a small butterfly fragment that is very prominent in the subcutaneous tissue over her left ulna. Her left ankle and Achilles is doing pretty well she is continuing wearing the boot and she is able to get around pretty well with the boot now. .    Past Medical History:   Diagnosis Date    Allergic rhinitis     Chronic pain     left hip pain- a lot better    Depression     Deviated septum     Disorder of bone and cartilage, unspecified 3/9/2015    Generalized anxiety disorder     GERD (gastroesophageal reflux disease)     on meication for this    History of hand fracture 2004    Intestinal disaccharidase deficiencies and disaccharide malabsorption 7/5/0944    Lichen planus     oral    Mixed hyperlipidemia 3/9/2015    Nonspecific elevation of levels of transaminase or lactic acid dehydrogenase (LDH) 3/9/2015    Osteoarthritis of multiple joints 3/15/2016    Osteoporosis 12/4/2015    Pain in joint, lower leg 3/9/2015    Plantar fascial fibromatosis     Prediabetes 06/22/2016    no current meds, pt doesnt monitor BS    SNHL (sensorineural hearing loss)     Sudden hearing loss of both ears     HA to nicole ears    Tinnitus of both ears      Past Surgical History:   Procedure Laterality Date    HX COLONOSCOPY      HX HYSTERECTOMY      hysterectomy about 30 years ago\"    HX ORTHOPAEDIC Right     wrist surgery    HX OTHER SURGICAL Left     elbow, arm, wrist    HX TONSILLECTOMY      tonsillectomy at age 21 or 18\"      FAMHX -No history of inflammatory arthritis   Social History     Tobacco Use    Smoking status: Never Smoker    Smokeless tobacco: Never Used   Substance Use Topics    Alcohol use: No     Alcohol/week: 0.0 standard drinks      Current Outpatient Medications   Medication Sig Dispense Refill    ALPRAZolam (Xanax) 0.5 mg tablet Take 1 Tablet by mouth nightly as needed for Anxiety. Max Daily Amount: 0.5 mg. Indications: anxious 30 Tablet 1    ondansetron (ZOFRAN ODT) 4 mg disintegrating tablet Take 1 Tablet by mouth every eight (8) hours as needed for Nausea or Vomiting. 20 Tablet 0    citalopram (CELEXA) 20 mg tablet Take 1 Tablet by mouth daily. Indications: major depressive disorder 30 Tablet 0    alendronate (FOSAMAX) 70 mg tablet Take 1 Tablet by mouth every seven (7) days. Indications: decreased bone mass following menopause 4 Tablet 5    pantoprazole (PROTONIX) 40 mg tablet Take 1 Tablet by mouth daily. 90 Tablet 3    meloxicam (Mobic) 15 mg tablet Take 1 Tab by mouth nightly. 90 Tab 3    pravastatin (PRAVACHOL) 40 mg tablet Take 1 Tab by mouth nightly. 90 Tab 3    fluticasone propionate (FLONASE) 50 mcg/actuation nasal spray 2 Sprays by Both Nostrils route daily. Indications: inflammation of the nose due to an allergy (Patient taking differently: 2 Sprays by Both Nostrils route as needed. Indications: inflammation of the nose due to an allergy) 1 Bottle 3    cholecalciferol, vitamin D3, (VITAMIN D3 PO) Take  by mouth daily.       clotrimazole (LOTRIMIN) 1 % topical cream Apply  to affected area two (2) times daily as needed. (Patient not taking: Reported on 1/7/2022)      CALCIUM CARBONATE/VITAMIN D3 (CALCIUM 600 + D,3, PO) Take 1 Tab by mouth daily.  SENNOSIDES (SENNA LAX PO) Take 1 Tab by mouth daily.  MULTIVITAMIN PO Take 1 Tab by mouth daily. Allergies   Allergen Reactions    Amoxicillin Diarrhea     Per pt gets bruising spots on skin     Demerol [Meperidine] Nausea and Vomiting       Review of Systems:  A comprehensive review of systems was negative except for that written in the History of Present Illness. Objective: There were no vitals filed for this visit. Physical Exam:  Physical Exam:  General:  Alert, cooperative, no distress, appears stated age. Orientation alert oriented person place time and situation   Eyes:  Conjunctivae/corneas clear. PERRL, EOMs intact. Fundi benign   Ears:  Normal TMs and external ear canals both ears. Nose: Nares normal. Septum midline. Mucosa normal. No drainage or sinus tenderness. Mouth/Throat: Lips, mucosa, and tongue normal. Teeth and gums normal.   Neck: Supple, symmetrical, trachea midline, no adenopathy, thyroid: no enlargment/tenderness/nodules, no carotid bruit and no JVD. Back:   Symmetric, no curvature. ROM normal. No CVA tenderness. Lungs:   Clear to auscultation bilaterally. Heart:  Regular rate and rhythm, S1, S2 normal, no murmur, click, rub or gallop. Abdomen:   Soft, non-tender. Bowel sounds normal. No masses,  No organomegaly. No lymphadenopathy in all 4 extremities  Alignment-she has near normal alignment of her left wrist  Range of motion-she has a significant mount of stiffness of her left wrist as well as all the fingers of her left hand but she has just come out of a cast  Vascular-distal pulses palpable in left upper extremity  Sensory/motor-deep tendon reflexes normal left upper extremity. Motor and sensory function intact.   Stability-no evidence of any instability left wrist  Tenderness to palpation she does have an exquisite amount of tenderness to palpation over this prominent fragment around her left ulna  Skin-all of her incisions have healed her wounds have healed  Gait-she walks with a marked antalgic gait with her left lower extremity due to her Killey tendon rupture    Assessment:     Hospital Problems  Date Reviewed: 1/17/2022    None        Number 1-3 months out from left posterior elbow dislocation treated with closed reduction #2-3 months out from closed treatment of left Achilles tendon rupture #3-6 weeks out from most recent surgical intervention with plate fixation and bone grafting of left distal radius fracture now healing    Xrays and or studies:    AP and lateral views of the left wrist shows a left distal radius fracture is healing and healing in excellent position on both the AP and lateral projection. The ulna also shows significant evidence of healing is overall alignment is satisfactory. However there is a fragment which essentially is a butterfly fragment that extends off the dorsal and ulnar aspect of the left wrist impression #healing left distal radius fracture with osteophyte left ulna  Plan:     She really is bothered by this bone in the left ulna and she seems to think this is her biggest problem despite the fact that she has had 3 major injuries. As result I have offered to just excise this which should be a very simple procedure however it would require just an outpatient surgical procedure where I make a small incision and likely just remove this butterfly fragment. This is a very small fragment and the remainder of the ulna is healing well subtle think I would have to do anything other than just excise this. She would like me to go ahead and do this so we will plan on doing this next week.  As far as her left wrist is concerned I would like her to be out of any immobilization and get started with therapy with full active and aggressive passive range of motion of all fingers of the left hand as well as the left wrist and left elbow. I would also like her to begin weaning herself out of the Cam walker boot and we have given her just a ankle stabilizing orthotic type of ankle brace that she can wear in her normal shoe would like her to begin weaning herself out of the boot and walking on her left lower extremity. She can also get therapy for full active and passive range of motion full strengthening left lower extremity with Achilles tendon strengthening.  We will proceed with surgical intervention next week for the resection of the osteophyte from the left ulna    Signed By: Mino Lion MD     February 18, 2022

## 2022-02-21 ENCOUNTER — HOME HEALTH ADMISSION (OUTPATIENT)
Dept: HOME HEALTH SERVICES | Facility: HOME HEALTH | Age: 77
End: 2022-02-21
Payer: MEDICARE

## 2022-02-22 ENCOUNTER — HOME CARE VISIT (OUTPATIENT)
Dept: SCHEDULING | Facility: HOME HEALTH | Age: 77
End: 2022-02-22
Payer: MEDICARE

## 2022-02-22 ENCOUNTER — NURSE TRIAGE (OUTPATIENT)
Dept: OTHER | Facility: CLINIC | Age: 77
End: 2022-02-22

## 2022-02-22 VITALS
SYSTOLIC BLOOD PRESSURE: 130 MMHG | RESPIRATION RATE: 16 BRPM | DIASTOLIC BLOOD PRESSURE: 80 MMHG | TEMPERATURE: 98.3 F | OXYGEN SATURATION: 97 % | HEART RATE: 76 BPM

## 2022-02-22 PROCEDURE — G0151 HHCP-SERV OF PT,EA 15 MIN: HCPCS

## 2022-02-22 PROCEDURE — 3331090002 HH PPS REVENUE DEBIT

## 2022-02-22 PROCEDURE — 400018 HH-NO PAY CLAIM PROCEDURE

## 2022-02-22 PROCEDURE — 400013 HH SOC

## 2022-02-22 PROCEDURE — 3331090001 HH PPS REVENUE CREDIT

## 2022-02-23 PROCEDURE — 3331090001 HH PPS REVENUE CREDIT

## 2022-02-23 PROCEDURE — 3331090002 HH PPS REVENUE DEBIT

## 2022-02-24 ENCOUNTER — HOME CARE VISIT (OUTPATIENT)
Dept: SCHEDULING | Facility: HOME HEALTH | Age: 77
End: 2022-02-24
Payer: MEDICARE

## 2022-02-24 VITALS
TEMPERATURE: 97.5 F | RESPIRATION RATE: 16 BRPM | DIASTOLIC BLOOD PRESSURE: 84 MMHG | SYSTOLIC BLOOD PRESSURE: 140 MMHG | OXYGEN SATURATION: 98 % | HEART RATE: 70 BPM

## 2022-02-24 VITALS
HEART RATE: 81 BPM | OXYGEN SATURATION: 98 % | RESPIRATION RATE: 19 BRPM | TEMPERATURE: 97.7 F | DIASTOLIC BLOOD PRESSURE: 80 MMHG | SYSTOLIC BLOOD PRESSURE: 145 MMHG

## 2022-02-24 PROCEDURE — 3331090001 HH PPS REVENUE CREDIT

## 2022-02-24 PROCEDURE — G0152 HHCP-SERV OF OT,EA 15 MIN: HCPCS

## 2022-02-24 PROCEDURE — 3331090002 HH PPS REVENUE DEBIT

## 2022-02-24 PROCEDURE — G0157 HHC PT ASSISTANT EA 15: HCPCS

## 2022-02-25 PROCEDURE — 3331090002 HH PPS REVENUE DEBIT

## 2022-02-25 PROCEDURE — 3331090001 HH PPS REVENUE CREDIT

## 2022-02-26 PROCEDURE — 3331090001 HH PPS REVENUE CREDIT

## 2022-02-26 PROCEDURE — 3331090002 HH PPS REVENUE DEBIT

## 2022-02-27 PROCEDURE — 3331090002 HH PPS REVENUE DEBIT

## 2022-02-27 PROCEDURE — 3331090001 HH PPS REVENUE CREDIT

## 2022-02-28 ENCOUNTER — HOME CARE VISIT (OUTPATIENT)
Dept: SCHEDULING | Facility: HOME HEALTH | Age: 77
End: 2022-02-28
Payer: MEDICARE

## 2022-02-28 PROCEDURE — 3331090002 HH PPS REVENUE DEBIT

## 2022-02-28 PROCEDURE — G0157 HHC PT ASSISTANT EA 15: HCPCS

## 2022-02-28 PROCEDURE — 3331090001 HH PPS REVENUE CREDIT

## 2022-03-01 ENCOUNTER — HOME CARE VISIT (OUTPATIENT)
Dept: SCHEDULING | Facility: HOME HEALTH | Age: 77
End: 2022-03-01
Payer: MEDICARE

## 2022-03-01 VITALS
TEMPERATURE: 97.5 F | SYSTOLIC BLOOD PRESSURE: 124 MMHG | RESPIRATION RATE: 16 BRPM | DIASTOLIC BLOOD PRESSURE: 80 MMHG | HEART RATE: 69 BPM | OXYGEN SATURATION: 96 %

## 2022-03-01 VITALS
TEMPERATURE: 97.7 F | RESPIRATION RATE: 16 BRPM | HEART RATE: 77 BPM | OXYGEN SATURATION: 98 % | DIASTOLIC BLOOD PRESSURE: 84 MMHG | SYSTOLIC BLOOD PRESSURE: 129 MMHG

## 2022-03-01 PROCEDURE — G0158 HHC OT ASSISTANT EA 15: HCPCS

## 2022-03-01 PROCEDURE — 3331090001 HH PPS REVENUE CREDIT

## 2022-03-01 PROCEDURE — 3331090002 HH PPS REVENUE DEBIT

## 2022-03-02 PROCEDURE — 3331090001 HH PPS REVENUE CREDIT

## 2022-03-02 PROCEDURE — 3331090002 HH PPS REVENUE DEBIT

## 2022-03-03 ENCOUNTER — HOME CARE VISIT (OUTPATIENT)
Dept: SCHEDULING | Facility: HOME HEALTH | Age: 77
End: 2022-03-03
Payer: MEDICARE

## 2022-03-03 VITALS
DIASTOLIC BLOOD PRESSURE: 60 MMHG | RESPIRATION RATE: 16 BRPM | SYSTOLIC BLOOD PRESSURE: 109 MMHG | HEART RATE: 80 BPM | TEMPERATURE: 97.7 F | OXYGEN SATURATION: 98 %

## 2022-03-03 PROCEDURE — G0158 HHC OT ASSISTANT EA 15: HCPCS

## 2022-03-03 PROCEDURE — 3331090002 HH PPS REVENUE DEBIT

## 2022-03-03 PROCEDURE — 3331090001 HH PPS REVENUE CREDIT

## 2022-03-04 ENCOUNTER — HOME CARE VISIT (OUTPATIENT)
Dept: SCHEDULING | Facility: HOME HEALTH | Age: 77
End: 2022-03-04
Payer: MEDICARE

## 2022-03-04 VITALS
RESPIRATION RATE: 15 BRPM | HEART RATE: 77 BPM | SYSTOLIC BLOOD PRESSURE: 110 MMHG | DIASTOLIC BLOOD PRESSURE: 80 MMHG | OXYGEN SATURATION: 97 % | TEMPERATURE: 97.7 F

## 2022-03-04 PROCEDURE — 3331090001 HH PPS REVENUE CREDIT

## 2022-03-04 PROCEDURE — G0158 HHC OT ASSISTANT EA 15: HCPCS

## 2022-03-04 PROCEDURE — 3331090002 HH PPS REVENUE DEBIT

## 2022-03-04 PROCEDURE — G0157 HHC PT ASSISTANT EA 15: HCPCS

## 2022-03-05 PROCEDURE — 3331090002 HH PPS REVENUE DEBIT

## 2022-03-05 PROCEDURE — 3331090001 HH PPS REVENUE CREDIT

## 2022-03-06 VITALS
RESPIRATION RATE: 16 BRPM | SYSTOLIC BLOOD PRESSURE: 115 MMHG | HEART RATE: 74 BPM | OXYGEN SATURATION: 98 % | DIASTOLIC BLOOD PRESSURE: 76 MMHG | TEMPERATURE: 97.7 F

## 2022-03-06 PROCEDURE — 3331090002 HH PPS REVENUE DEBIT

## 2022-03-06 PROCEDURE — 3331090001 HH PPS REVENUE CREDIT

## 2022-03-07 ENCOUNTER — HOME CARE VISIT (OUTPATIENT)
Dept: SCHEDULING | Facility: HOME HEALTH | Age: 77
End: 2022-03-07
Payer: MEDICARE

## 2022-03-07 VITALS
SYSTOLIC BLOOD PRESSURE: 134 MMHG | TEMPERATURE: 97.5 F | DIASTOLIC BLOOD PRESSURE: 80 MMHG | RESPIRATION RATE: 15 BRPM | OXYGEN SATURATION: 96 % | HEART RATE: 85 BPM

## 2022-03-07 PROCEDURE — G0157 HHC PT ASSISTANT EA 15: HCPCS

## 2022-03-07 PROCEDURE — 3331090002 HH PPS REVENUE DEBIT

## 2022-03-07 PROCEDURE — 3331090001 HH PPS REVENUE CREDIT

## 2022-03-08 ENCOUNTER — HOME CARE VISIT (OUTPATIENT)
Dept: SCHEDULING | Facility: HOME HEALTH | Age: 77
End: 2022-03-08
Payer: MEDICARE

## 2022-03-08 VITALS
RESPIRATION RATE: 17 BRPM | HEART RATE: 71 BPM | SYSTOLIC BLOOD PRESSURE: 135 MMHG | DIASTOLIC BLOOD PRESSURE: 89 MMHG | TEMPERATURE: 97.7 F | OXYGEN SATURATION: 99 %

## 2022-03-08 PROCEDURE — 3331090002 HH PPS REVENUE DEBIT

## 2022-03-08 PROCEDURE — G0158 HHC OT ASSISTANT EA 15: HCPCS

## 2022-03-08 PROCEDURE — 3331090001 HH PPS REVENUE CREDIT

## 2022-03-09 PROCEDURE — 3331090001 HH PPS REVENUE CREDIT

## 2022-03-09 PROCEDURE — 3331090002 HH PPS REVENUE DEBIT

## 2022-03-10 ENCOUNTER — HOME CARE VISIT (OUTPATIENT)
Dept: SCHEDULING | Facility: HOME HEALTH | Age: 77
End: 2022-03-10
Payer: MEDICARE

## 2022-03-10 VITALS
DIASTOLIC BLOOD PRESSURE: 80 MMHG | OXYGEN SATURATION: 97 % | TEMPERATURE: 97.8 F | HEART RATE: 76 BPM | SYSTOLIC BLOOD PRESSURE: 137 MMHG | RESPIRATION RATE: 16 BRPM

## 2022-03-10 PROCEDURE — 3331090001 HH PPS REVENUE CREDIT

## 2022-03-10 PROCEDURE — 3331090002 HH PPS REVENUE DEBIT

## 2022-03-10 PROCEDURE — G0158 HHC OT ASSISTANT EA 15: HCPCS

## 2022-03-11 ENCOUNTER — HOME CARE VISIT (OUTPATIENT)
Dept: SCHEDULING | Facility: HOME HEALTH | Age: 77
End: 2022-03-11
Payer: MEDICARE

## 2022-03-11 VITALS
RESPIRATION RATE: 16 BRPM | OXYGEN SATURATION: 97 % | HEART RATE: 70 BPM | SYSTOLIC BLOOD PRESSURE: 128 MMHG | DIASTOLIC BLOOD PRESSURE: 82 MMHG | TEMPERATURE: 97.3 F

## 2022-03-11 PROCEDURE — 3331090002 HH PPS REVENUE DEBIT

## 2022-03-11 PROCEDURE — G0157 HHC PT ASSISTANT EA 15: HCPCS

## 2022-03-11 PROCEDURE — 3331090001 HH PPS REVENUE CREDIT

## 2022-03-11 NOTE — CASE COMMUNICATION
Please update the medication profile with the following medication for pain control. Tramadol HCL 50 mg Tablet prescribed by Dr Gil Bailey on 1.13.21.  Instructtions to take 1 tablet by mouth three times a day as needed for pain

## 2022-03-12 PROCEDURE — 3331090002 HH PPS REVENUE DEBIT

## 2022-03-12 PROCEDURE — 3331090001 HH PPS REVENUE CREDIT

## 2022-03-13 PROCEDURE — 3331090001 HH PPS REVENUE CREDIT

## 2022-03-13 PROCEDURE — 3331090002 HH PPS REVENUE DEBIT

## 2022-03-14 ENCOUNTER — HOME CARE VISIT (OUTPATIENT)
Dept: SCHEDULING | Facility: HOME HEALTH | Age: 77
End: 2022-03-14
Payer: MEDICARE

## 2022-03-14 VITALS
RESPIRATION RATE: 15 BRPM | HEART RATE: 73 BPM | DIASTOLIC BLOOD PRESSURE: 80 MMHG | TEMPERATURE: 97.5 F | OXYGEN SATURATION: 95 % | SYSTOLIC BLOOD PRESSURE: 124 MMHG

## 2022-03-14 PROCEDURE — 3331090002 HH PPS REVENUE DEBIT

## 2022-03-14 PROCEDURE — G0157 HHC PT ASSISTANT EA 15: HCPCS

## 2022-03-14 PROCEDURE — 3331090001 HH PPS REVENUE CREDIT

## 2022-03-15 ENCOUNTER — HOME CARE VISIT (OUTPATIENT)
Dept: SCHEDULING | Facility: HOME HEALTH | Age: 77
End: 2022-03-15
Payer: MEDICARE

## 2022-03-15 VITALS
DIASTOLIC BLOOD PRESSURE: 80 MMHG | TEMPERATURE: 97.1 F | RESPIRATION RATE: 16 BRPM | SYSTOLIC BLOOD PRESSURE: 138 MMHG | HEART RATE: 69 BPM | OXYGEN SATURATION: 97 %

## 2022-03-15 VITALS
RESPIRATION RATE: 16 BRPM | TEMPERATURE: 97.7 F | HEART RATE: 71 BPM | OXYGEN SATURATION: 98 % | SYSTOLIC BLOOD PRESSURE: 128 MMHG | DIASTOLIC BLOOD PRESSURE: 78 MMHG

## 2022-03-15 PROCEDURE — G0158 HHC OT ASSISTANT EA 15: HCPCS

## 2022-03-15 PROCEDURE — 3331090002 HH PPS REVENUE DEBIT

## 2022-03-15 PROCEDURE — G0151 HHCP-SERV OF PT,EA 15 MIN: HCPCS

## 2022-03-15 PROCEDURE — 3331090001 HH PPS REVENUE CREDIT

## 2022-03-16 PROCEDURE — 3331090001 HH PPS REVENUE CREDIT

## 2022-03-16 PROCEDURE — 3331090002 HH PPS REVENUE DEBIT

## 2022-03-17 ENCOUNTER — HOME CARE VISIT (OUTPATIENT)
Dept: HOME HEALTH SERVICES | Facility: HOME HEALTH | Age: 77
End: 2022-03-17
Payer: MEDICARE

## 2022-03-17 PROCEDURE — 3331090002 HH PPS REVENUE DEBIT

## 2022-03-17 PROCEDURE — 3331090001 HH PPS REVENUE CREDIT

## 2022-03-18 PROBLEM — S86.002A ACHILLES TENDON INJURY, LEFT, INITIAL ENCOUNTER: Status: ACTIVE | Noted: 2021-11-22

## 2022-03-18 PROBLEM — R06.02 SHORTNESS OF BREATH: Status: ACTIVE | Noted: 2021-08-31

## 2022-03-18 PROCEDURE — 3331090002 HH PPS REVENUE DEBIT

## 2022-03-18 PROCEDURE — 3331090001 HH PPS REVENUE CREDIT

## 2022-03-19 PROBLEM — S52.532B OPEN COLLES' FRACTURE OF LEFT RADIUS: Status: ACTIVE | Noted: 2021-11-19

## 2022-03-19 PROBLEM — H72.90 CHRONIC TUBOTYMPANIC DISEASE WITH ANTERIOR PERFORATION OF TYMPANIC MEMBRANE: Status: ACTIVE | Noted: 2017-01-16

## 2022-03-19 PROBLEM — E55.9 VITAMIN D DEFICIENCY: Status: ACTIVE | Noted: 2018-02-22

## 2022-03-19 PROBLEM — R73.09 ELEVATED HEMOGLOBIN A1C: Status: ACTIVE | Noted: 2017-01-16

## 2022-03-19 PROBLEM — H66.10 CHRONIC TUBOTYMPANIC DISEASE WITH ANTERIOR PERFORATION OF TYMPANIC MEMBRANE: Status: ACTIVE | Noted: 2017-01-16

## 2022-03-19 PROBLEM — F11.99 OPIOID USE, UNSPECIFIED WITH UNSPECIFIED OPIOID-INDUCED DISORDER (HCC): Status: ACTIVE | Noted: 2021-12-14

## 2022-03-19 PROBLEM — F33.0 MILD EPISODE OF RECURRENT MAJOR DEPRESSIVE DISORDER (HCC): Status: ACTIVE | Noted: 2017-08-01

## 2022-03-19 PROCEDURE — 3331090001 HH PPS REVENUE CREDIT

## 2022-03-19 PROCEDURE — 3331090002 HH PPS REVENUE DEBIT

## 2022-03-20 PROCEDURE — 3331090002 HH PPS REVENUE DEBIT

## 2022-03-20 PROCEDURE — 3331090001 HH PPS REVENUE CREDIT

## 2022-03-21 PROCEDURE — 3331090002 HH PPS REVENUE DEBIT

## 2022-03-21 PROCEDURE — 3331090001 HH PPS REVENUE CREDIT

## 2022-03-22 ENCOUNTER — HOME CARE VISIT (OUTPATIENT)
Dept: SCHEDULING | Facility: HOME HEALTH | Age: 77
End: 2022-03-22
Payer: MEDICARE

## 2022-03-22 PROCEDURE — G0152 HHCP-SERV OF OT,EA 15 MIN: HCPCS

## 2022-03-22 PROCEDURE — 3331090002 HH PPS REVENUE DEBIT

## 2022-03-22 PROCEDURE — 3331090001 HH PPS REVENUE CREDIT

## 2022-03-23 VITALS
RESPIRATION RATE: 16 BRPM | TEMPERATURE: 97.6 F | OXYGEN SATURATION: 97 % | DIASTOLIC BLOOD PRESSURE: 70 MMHG | HEART RATE: 78 BPM | SYSTOLIC BLOOD PRESSURE: 142 MMHG

## 2022-03-23 PROCEDURE — 3331090001 HH PPS REVENUE CREDIT

## 2022-03-23 PROCEDURE — 3331090002 HH PPS REVENUE DEBIT

## 2022-04-12 ENCOUNTER — HOSPITAL ENCOUNTER (OUTPATIENT)
Dept: GENERAL RADIOLOGY | Age: 77
Discharge: HOME | End: 2022-04-12

## 2022-04-12 DIAGNOSIS — G89.29 CHRONIC MIDLINE LOW BACK PAIN WITHOUT SCIATICA: ICD-10-CM

## 2022-04-12 DIAGNOSIS — M54.50 CHRONIC MIDLINE LOW BACK PAIN WITHOUT SCIATICA: ICD-10-CM

## 2022-04-14 NOTE — PROGRESS NOTES
Your lumbar spine x-ray shows scoliosis but also shows degenerative disc disease. There is some loss of height in the lumbar area. Any numbness or tingling in the lower extremities?

## 2022-05-31 ENCOUNTER — OFFICE VISIT (OUTPATIENT)
Dept: ORTHOPEDIC SURGERY | Age: 77
End: 2022-05-31
Payer: MEDICARE

## 2022-05-31 DIAGNOSIS — M25.511 RIGHT SHOULDER PAIN, UNSPECIFIED CHRONICITY: ICD-10-CM

## 2022-05-31 DIAGNOSIS — S52.532E TYPE I OR II OPEN COLLES' FRACTURE OF LEFT RADIUS WITH ROUTINE HEALING, SUBSEQUENT ENCOUNTER: Primary | ICD-10-CM

## 2022-05-31 PROCEDURE — 20610 DRAIN/INJ JOINT/BURSA W/O US: CPT | Performed by: ORTHOPAEDIC SURGERY

## 2022-05-31 PROCEDURE — 99024 POSTOP FOLLOW-UP VISIT: CPT | Performed by: ORTHOPAEDIC SURGERY

## 2022-05-31 RX ORDER — METHYLPREDNISOLONE ACETATE 80 MG/ML
80 INJECTION, SUSPENSION INTRA-ARTICULAR; INTRALESIONAL; INTRAMUSCULAR; SOFT TISSUE ONCE
Status: COMPLETED | OUTPATIENT
Start: 2022-05-31 | End: 2022-05-31

## 2022-05-31 RX ADMIN — METHYLPREDNISOLONE ACETATE 80 MG: 80 INJECTION, SUSPENSION INTRA-ARTICULAR; INTRALESIONAL; INTRAMUSCULAR; SOFT TISSUE at 14:48

## 2022-05-31 NOTE — PROGRESS NOTES
Progress Note    Patient: Rosemary Diaomnd MRN: [id number]  SSN: [id number]    YOB: 1945  Age: 68 y.o. Sex: female        5/31/2022      Subjective:     Patient is now 5 months out her original injuries. She really had 3 injuries she had a elbow dislocation on the left she will had very complex distal radius and ulnar fracture on the left and she had an Achilles tendon rupture on the left. Her elbow and wrist are doing very well. She really has no complaint with these. She does still have a lot of pain and some weakness and swelling with her left ankle and left lower leg. This seems to be gradually getting little bit better. The main thing that is bothering her now is her right shoulder. She says she feels that since she has injured her left leg and her left arm that she is really been reliant on her right shoulder and she remembers a specific incident in the hospital where she sort of reached over her body and pulled herself up and she felt something in her right shoulder this is really been bothering her quite a bit. She is never had any other treatment for her right shoulder before. Objective: There were no vitals filed for this visit. Physical Exam:     Skin - incision is well healed with no redness or drainage  Motor and sensory function intact in LEFT UPPER and RIGHT UPPER extremity  Pulses palpable in LEFT UPPER and RIGHT UPPER extremity   She does have a positive impingement sign on the right and she has a significant amount of pain with testing of her supraspinatus. She appears to have some reasonable strength in her supraspinatus about 3 out of 5 that is hampered mainly because of her pain  XRAY FINDINGS:  Indicationsfollow-up left distal radius and ulnar fractures, findingsAP and lateral views of the left wrist shows the left distal radius and ulna fractures have healed healed in very reasonable position.   The hardware is intact with no evidence of loosening or failure impression #healed well aligned left distal radius and ulna fractures    Indicationsright shoulder pain after a fall, findings true AP and scapular Y views of the right shoulder shows she does have some moderate degenerative changes in the glenohumeral joint she also has a mildly high riding humeral head and some degenerative changes in the acromioclavicular joint as well. Impression degenerative changes right glenohumeral and acromioclavicular joints    Assessment:     #1right shoulder impingement #2 healing left upper and lower extremity injuries    Plan:     I think she can be seen back on an as-needed basis for her left upper extremity and left lower extremity however for her right shoulder I have offered her an injection and she has excepted so after sterile prep I have injected her right subacromial space with lidocaine Marcaine and Depo-Medrol. We also can get her set up with some physical therapy of her right shoulder to see if we can do some rotator cuff strengthening exercises hopefully this will help. If she does not get better from this and we would likely need to send her to see one of the shoulder specialist in her group and see what they think. She seems to be comfortable with this plan.     Injection    Signed By: Carmel Sawant MD     May 31, 2022

## 2022-06-05 DIAGNOSIS — K21.9 GASTRO-ESOPHAGEAL REFLUX DISEASE WITHOUT ESOPHAGITIS: ICD-10-CM

## 2022-06-06 DIAGNOSIS — M89.49 OTHER HYPERTROPHIC OSTEOARTHROPATHY, MULTIPLE SITES: ICD-10-CM

## 2022-06-06 RX ORDER — MELOXICAM 15 MG/1
TABLET ORAL
Qty: 90 TABLET | Refills: 3 | OUTPATIENT
Start: 2022-06-06

## 2022-06-06 RX ORDER — PANTOPRAZOLE SODIUM 40 MG/1
TABLET, DELAYED RELEASE ORAL
Qty: 90 TABLET | Refills: 3 | OUTPATIENT
Start: 2022-06-06

## 2022-06-08 DIAGNOSIS — E78.2 MIXED HYPERLIPIDEMIA: ICD-10-CM

## 2022-06-08 DIAGNOSIS — E55.9 VITAMIN D DEFICIENCY: Primary | ICD-10-CM

## 2022-06-08 RX ORDER — MELOXICAM 15 MG/1
TABLET ORAL
Qty: 90 TABLET | Refills: 3 | OUTPATIENT
Start: 2022-06-08

## 2022-06-14 RX ORDER — MELOXICAM 15 MG/1
15 TABLET ORAL DAILY
Qty: 90 TABLET | Refills: 0 | Status: SHIPPED | OUTPATIENT
Start: 2022-06-14 | End: 2022-09-07 | Stop reason: SDUPTHER

## 2022-06-21 ENCOUNTER — HOSPITAL ENCOUNTER (OUTPATIENT)
Dept: LAB | Age: 77
Discharge: HOME | End: 2022-06-24

## 2022-06-21 PROCEDURE — 88305 TISSUE EXAM BY PATHOLOGIST: CPT

## 2022-07-26 ENCOUNTER — TELEPHONE (OUTPATIENT)
Dept: INTERNAL MEDICINE CLINIC | Facility: CLINIC | Age: 77
End: 2022-07-26

## 2022-07-26 NOTE — TELEPHONE ENCOUNTER
----- Message from Darciepatsy Harp sent at 7/26/2022 11:48 AM EDT -----  Subject: Message to Provider    QUESTIONS  Information for Provider? Viridiana Lewis needs to know where she is   supposed to get her labs done at? Please contact pt to let her know. Thank   you  ---------------------------------------------------------------------------  --------------  Ben Real INFO  [phone]; OK to leave message on voicemail  ---------------------------------------------------------------------------  --------------  SCRIPT ANSWERS  Relationship to Patient?  Self

## 2022-07-27 DIAGNOSIS — E55.9 VITAMIN D DEFICIENCY: ICD-10-CM

## 2022-07-27 DIAGNOSIS — E78.2 MIXED HYPERLIPIDEMIA: ICD-10-CM

## 2022-07-27 LAB
25(OH)D3 SERPL-MCNC: 30 NG/ML (ref 30–100)
ALBUMIN SERPL-MCNC: 4.3 G/DL (ref 3.2–4.6)
ALBUMIN/GLOB SERPL: 1.7 {RATIO} (ref 1.2–3.5)
ALP SERPL-CCNC: 75 U/L (ref 50–136)
ALT SERPL-CCNC: 30 U/L (ref 12–65)
ANION GAP SERPL CALC-SCNC: 6 MMOL/L (ref 7–16)
AST SERPL-CCNC: 19 U/L (ref 15–37)
BASOPHILS # BLD: 0.1 K/UL (ref 0–0.2)
BASOPHILS NFR BLD: 1 % (ref 0–2)
BILIRUB SERPL-MCNC: 0.6 MG/DL (ref 0.2–1.1)
BUN SERPL-MCNC: 28 MG/DL (ref 8–23)
CALCIUM SERPL-MCNC: 9.8 MG/DL (ref 8.3–10.4)
CHLORIDE SERPL-SCNC: 107 MMOL/L (ref 98–107)
CHOLEST SERPL-MCNC: 196 MG/DL
CO2 SERPL-SCNC: 27 MMOL/L (ref 21–32)
CREAT SERPL-MCNC: 0.9 MG/DL (ref 0.6–1)
DIFFERENTIAL METHOD BLD: NORMAL
EOSINOPHIL # BLD: 0.1 K/UL (ref 0–0.8)
EOSINOPHIL NFR BLD: 2 % (ref 0.5–7.8)
ERYTHROCYTE [DISTWIDTH] IN BLOOD BY AUTOMATED COUNT: 12.3 % (ref 11.9–14.6)
GLOBULIN SER CALC-MCNC: 2.6 G/DL (ref 2.3–3.5)
GLUCOSE SERPL-MCNC: 104 MG/DL (ref 65–100)
HCT VFR BLD AUTO: 43.2 % (ref 35.8–46.3)
HDLC SERPL-MCNC: 59 MG/DL (ref 40–60)
HDLC SERPL: 3.3 {RATIO}
HGB BLD-MCNC: 14.1 G/DL (ref 11.7–15.4)
IMM GRANULOCYTES # BLD AUTO: 0 K/UL (ref 0–0.5)
IMM GRANULOCYTES NFR BLD AUTO: 0 % (ref 0–5)
LDLC SERPL CALC-MCNC: 107.4 MG/DL
LYMPHOCYTES # BLD: 2.3 K/UL (ref 0.5–4.6)
LYMPHOCYTES NFR BLD: 37 % (ref 13–44)
MCH RBC QN AUTO: 31.8 PG (ref 26.1–32.9)
MCHC RBC AUTO-ENTMCNC: 32.6 G/DL (ref 31.4–35)
MCV RBC AUTO: 97.3 FL (ref 79.6–97.8)
MONOCYTES # BLD: 0.4 K/UL (ref 0.1–1.3)
MONOCYTES NFR BLD: 7 % (ref 4–12)
NEUTS SEG # BLD: 3.2 K/UL (ref 1.7–8.2)
NEUTS SEG NFR BLD: 53 % (ref 43–78)
NRBC # BLD: 0 K/UL (ref 0–0.2)
PLATELET # BLD AUTO: 260 K/UL (ref 150–450)
PMV BLD AUTO: 10.1 FL (ref 9.4–12.3)
POTASSIUM SERPL-SCNC: 4.3 MMOL/L (ref 3.5–5.1)
PROT SERPL-MCNC: 6.9 G/DL (ref 6.3–8.2)
RBC # BLD AUTO: 4.44 M/UL (ref 4.05–5.2)
SODIUM SERPL-SCNC: 140 MMOL/L (ref 136–145)
TRIGL SERPL-MCNC: 148 MG/DL (ref 35–150)
VLDLC SERPL CALC-MCNC: 29.6 MG/DL (ref 6–23)
WBC # BLD AUTO: 6.1 K/UL (ref 4.3–11.1)

## 2022-08-16 ENCOUNTER — TELEMEDICINE (OUTPATIENT)
Dept: INTERNAL MEDICINE CLINIC | Facility: CLINIC | Age: 77
End: 2022-08-16
Payer: MEDICARE

## 2022-08-16 DIAGNOSIS — E78.2 MIXED HYPERLIPIDEMIA: ICD-10-CM

## 2022-08-16 DIAGNOSIS — E55.9 VITAMIN D DEFICIENCY: Primary | ICD-10-CM

## 2022-08-16 DIAGNOSIS — M81.0 AGE-RELATED OSTEOPOROSIS WITHOUT CURRENT PATHOLOGICAL FRACTURE: ICD-10-CM

## 2022-08-16 PROCEDURE — 99213 OFFICE O/P EST LOW 20 MIN: CPT | Performed by: NURSE PRACTITIONER

## 2022-08-16 PROCEDURE — 1123F ACP DISCUSS/DSCN MKR DOCD: CPT | Performed by: NURSE PRACTITIONER

## 2022-08-16 RX ORDER — CITALOPRAM 20 MG/1
20 TABLET ORAL DAILY
Qty: 90 TABLET | Refills: 3 | Status: CANCELLED | OUTPATIENT
Start: 2022-08-16

## 2022-08-16 RX ORDER — FLUTICASONE PROPIONATE 50 MCG
2 SPRAY, SUSPENSION (ML) NASAL DAILY
Qty: 16 G | Refills: 1 | Status: CANCELLED | OUTPATIENT
Start: 2022-08-16

## 2022-08-16 RX ORDER — ALENDRONATE SODIUM 70 MG/1
70 TABLET ORAL
Qty: 4 TABLET | Refills: 5 | Status: CANCELLED | OUTPATIENT
Start: 2022-08-16

## 2022-08-16 RX ORDER — PANTOPRAZOLE SODIUM 40 MG/1
40 TABLET, DELAYED RELEASE ORAL DAILY
Qty: 90 TABLET | Refills: 3 | Status: CANCELLED | OUTPATIENT
Start: 2022-08-16

## 2022-08-16 RX ORDER — ALPRAZOLAM 0.5 MG/1
0.5 TABLET ORAL NIGHTLY PRN
Qty: 30 TABLET | Refills: 0 | Status: CANCELLED | OUTPATIENT
Start: 2022-08-16 | End: 2022-09-15

## 2022-08-16 ASSESSMENT — PATIENT HEALTH QUESTIONNAIRE - PHQ9
3. TROUBLE FALLING OR STAYING ASLEEP: 0
SUM OF ALL RESPONSES TO PHQ QUESTIONS 1-9: 0
8. MOVING OR SPEAKING SO SLOWLY THAT OTHER PEOPLE COULD HAVE NOTICED. OR THE OPPOSITE, BEING SO FIGETY OR RESTLESS THAT YOU HAVE BEEN MOVING AROUND A LOT MORE THAN USUAL: 0
5. POOR APPETITE OR OVEREATING: 0
SUM OF ALL RESPONSES TO PHQ9 QUESTIONS 1 & 2: 0
2. FEELING DOWN, DEPRESSED OR HOPELESS: 0
9. THOUGHTS THAT YOU WOULD BE BETTER OFF DEAD, OR OF HURTING YOURSELF: 0
7. TROUBLE CONCENTRATING ON THINGS, SUCH AS READING THE NEWSPAPER OR WATCHING TELEVISION: 0
1. LITTLE INTEREST OR PLEASURE IN DOING THINGS: 0
SUM OF ALL RESPONSES TO PHQ QUESTIONS 1-9: 0
6. FEELING BAD ABOUT YOURSELF - OR THAT YOU ARE A FAILURE OR HAVE LET YOURSELF OR YOUR FAMILY DOWN: 0
4. FEELING TIRED OR HAVING LITTLE ENERGY: 0

## 2022-08-16 NOTE — PROGRESS NOTES
Henny Hagen (:  1945) is a Established patient, here for evaluation of the following:    Assessment & Plan   Below is the assessment and plan developed based on review of pertinent history, physical exam, labs, studies, and medications. 1. Vitamin D deficiency  2. Mixed hyperlipidemia  -     Lipid Panel; Future  -     Comprehensive Metabolic Panel; Future  3. Age-related osteoporosis without current pathological fracture    Return in about 3 months (around 2022) for Follow-Up, HLD, with labs. Reviewed lab and continue same meds. Restart alendronate 70 mg weekly on empty stomach with full glass of water.     Subjective   HPI    Review of Systems       Objective   Patient-Reported Vitals  No data recorded     Physical Exam  [INSTRUCTIONS:  \"[x]\" Indicates a positive item  \"[]\" Indicates a negative item  -- DELETE ALL ITEMS NOT EXAMINED]    Constitutional: [x] Appears well-developed and well-nourished [x] No apparent distress      [] Abnormal -     Mental status: [x] Alert and awake  [x] Oriented to person/place/time [x] Able to follow commands    [] Abnormal -     Eyes:   EOM    [x]  Normal    [] Abnormal -   Sclera  [x]  Normal    [] Abnormal -          Discharge [x]  None visible   [] Abnormal -     HENT: [x] Normocephalic, atraumatic  [] Abnormal -   [x] Mouth/Throat: Mucous membranes are moist    External Ears [x] Normal  [] Abnormal -    Neck: [x] No visualized mass [] Abnormal -     Pulmonary/Chest: [x] Respiratory effort normal   [x] No visualized signs of difficulty breathing or respiratory distress        [] Abnormal -      Musculoskeletal:   [x] Normal gait with no signs of ataxia         [x] Normal range of motion of neck        [] Abnormal -     Neurological:        [x] No Facial Asymmetry (Cranial nerve 7 motor function) (limited exam due to video visit)          [x] No gaze palsy        [] Abnormal -          Skin:        [x] No significant exanthematous lesions or discoloration noted on facial skin         [] Abnormal -            Psychiatric:       [x] Normal Affect [] Abnormal -        [x] No Hallucinations    Other pertinent observable physical exam findings:-         On this date 8/16/2022 I have spent 31 minutes reviewing previous notes, test results and face to face (virtual) with the patient discussing the diagnosis and importance of compliance with the treatment plan as well as documenting on the day of the visit. Concha Diez, was evaluated through a synchronous (real-time) audio-video encounter. The patient (or guardian if applicable) is aware that this is a billable service, which includes applicable co-pays. This Virtual Visit was conducted with patient's (and/or legal guardian's) consent. The visit was conducted pursuant to the emergency declaration under the [address] waiver authority and the [email or web address] General Act. Patient identification was verified, and a caregiver was present when appropriate. The patient was located at Home: [place] [address]. Provider was located at [address] Hayden Delgado [address] [place] [address] Syed Mac.         --RUDDY Obregon - GURPREET

## 2022-08-16 NOTE — PATIENT INSTRUCTIONS
Vitamin D 5000 units one capsule daily. Restart alendronate 70 mg. Take one tablet weekly on an empty stomach with a full glass of water.

## 2022-08-19 RX ORDER — ALENDRONATE SODIUM 70 MG/1
TABLET ORAL
Qty: 12 TABLET | Refills: 1 | OUTPATIENT
Start: 2022-08-19

## 2022-08-19 RX ORDER — PANTOPRAZOLE SODIUM 40 MG/1
TABLET, DELAYED RELEASE ORAL
Qty: 90 TABLET | Refills: 3 | OUTPATIENT
Start: 2022-08-19

## 2022-08-19 RX ORDER — CITALOPRAM 20 MG/1
TABLET ORAL
Qty: 90 TABLET | Refills: 3 | OUTPATIENT
Start: 2022-08-19

## 2022-08-19 RX ORDER — FLUTICASONE PROPIONATE 50 MCG
SPRAY, SUSPENSION (ML) NASAL
Refills: 1 | OUTPATIENT
Start: 2022-08-19

## 2022-09-06 RX ORDER — CITALOPRAM 20 MG/1
TABLET ORAL
Qty: 90 TABLET | Refills: 3 | OUTPATIENT
Start: 2022-09-06

## 2022-09-07 ENCOUNTER — TELEPHONE (OUTPATIENT)
Dept: INTERNAL MEDICINE CLINIC | Facility: CLINIC | Age: 77
End: 2022-09-07

## 2022-09-07 DIAGNOSIS — M25.562 ARTHRALGIA OF LEFT LOWER LEG: ICD-10-CM

## 2022-09-07 DIAGNOSIS — M25.572 PAIN IN LEFT ANKLE AND JOINTS OF LEFT FOOT: ICD-10-CM

## 2022-09-07 DIAGNOSIS — K21.9 GASTROESOPHAGEAL REFLUX DISEASE, UNSPECIFIED WHETHER ESOPHAGITIS PRESENT: ICD-10-CM

## 2022-09-07 DIAGNOSIS — M81.0 AGE-RELATED OSTEOPOROSIS WITHOUT CURRENT PATHOLOGICAL FRACTURE: Primary | ICD-10-CM

## 2022-09-07 DIAGNOSIS — E55.9 VITAMIN D DEFICIENCY: Primary | ICD-10-CM

## 2022-09-07 DIAGNOSIS — E78.2 MIXED HYPERLIPIDEMIA: ICD-10-CM

## 2022-09-07 DIAGNOSIS — M15.9 PRIMARY OSTEOARTHRITIS INVOLVING MULTIPLE JOINTS: ICD-10-CM

## 2022-09-07 DIAGNOSIS — F32.1 CURRENT MODERATE EPISODE OF MAJOR DEPRESSIVE DISORDER WITHOUT PRIOR EPISODE (HCC): ICD-10-CM

## 2022-09-07 DIAGNOSIS — R73.01 ELEVATED FASTING GLUCOSE: ICD-10-CM

## 2022-09-07 RX ORDER — PANTOPRAZOLE SODIUM 40 MG/1
40 TABLET, DELAYED RELEASE ORAL DAILY
Qty: 90 TABLET | Refills: 3 | Status: SHIPPED | OUTPATIENT
Start: 2022-09-07

## 2022-09-07 RX ORDER — CITALOPRAM 20 MG/1
20 TABLET ORAL DAILY
Qty: 90 TABLET | Refills: 3 | Status: SHIPPED | OUTPATIENT
Start: 2022-09-07

## 2022-09-07 RX ORDER — ALENDRONATE SODIUM 70 MG/1
70 TABLET ORAL
Qty: 4 TABLET | Refills: 11 | Status: SHIPPED | OUTPATIENT
Start: 2022-09-07

## 2022-09-07 RX ORDER — MELOXICAM 15 MG/1
15 TABLET ORAL DAILY
Qty: 90 TABLET | Refills: 0 | Status: SHIPPED | OUTPATIENT
Start: 2022-09-07

## 2022-09-07 NOTE — TELEPHONE ENCOUNTER
[facility] Internal Medicine Clinical Staff  Subject: Refill Request     QUESTIONS   Name of Medication? pantoprazole (PROTONIX) 40 MG tablet   Patient-reported dosage and instructions? protonix 40mg once a day   How many days do you have left? 20   Preferred Pharmacy? [facility] #[id number]   Pharmacy phone number (if available)? [phone]   ---------------------------------------------------------------------------   --------------,   Name of Medication? alendronate (FOSAMAX) 70 MG tablet   Patient-reported dosage and instructions? fosamax 70mg once a week   How many days do you have left? 4   Preferred Pharmacy? [facility] #[id number]   Pharmacy phone number (if available)? [phone]   ---------------------------------------------------------------------------   --------------,   Name of Medication? ALPRAZolam (XANAX) 0.5 MG tablet   Patient-reported dosage and instructions? xanax 0.5mg once a day   How many days do you have left? 20   Preferred Pharmacy? [facility] #[id number]   Pharmacy phone number (if available)? [phone]   ---------------------------------------------------------------------------   --------------,   Name of Medication? citalopram (CELEXA) 20 MG tablet   Patient-reported dosage and instructions? celexa 20mg one a day   How many days do you have left? 20   Preferred Pharmacy? [id number]   Pharmacy phone number (if available)? [phone]   ---------------------------------------------------------------------------   --------------,   Name of Medication? meloxicam (MOBIC) 15 MG tablet   Patient-reported dosage and instructions? mobic 15mg once [id number] [phone]   Preferred Call Back Phone Number?  [phone]

## 2022-09-07 NOTE — TELEPHONE ENCOUNTER
----- Message from [facility] sent at 9/7/2022  9:37 AM EDT -----  Subject: Message to Provider    QUESTIONS  Information for Provider? patient has appt stacy for 12-19-22 follow up appt   and will need orders for labs prior to visit  ---------------------------------------------------------------------------  --------------  [id number]  [phone]; OK to leave message on voicemail  ---------------------------------------------------------------------------  --------------  SCRIPT ANSWERS  Relationship to Patient?  Self

## 2022-09-09 NOTE — PROGRESS NOTES
2021         Post Op day: 4 Days Post-Op Procedure(s) (LRB):  CLOSED REDUCTION LEFT ELBOW WITH APPLICATION EXTERNAL FIXATION LEFT WRIST (Left)      Admit Date: 2021  Admit Diagnosis: Open Colles' fracture of left radius [S52.532B]       Principle Problem: <principal problem not specified>. Subjective: Doing ok, No new complaints, her wrist pain is under control at this time. No SOB, No Chest Pain, No Nausea or Vomiting. Patient does not feel like she can take care of herself at home and has stairs that she does not think she can climb. Objective:   Vital Signs are Stable, No Acute Distress, Alert,  Dressing is Dry,  Neurovascular exam is normal.     Assessment / Plan :  Patient Active Problem List   Diagnosis Code    Pain in joint, lower leg M25.569    Mixed hyperlipidemia E78.2    Disorder of bone and cartilage, unspecified M89.9, M94.9    Cough R05.9    GERD (gastroesophageal reflux disease) K21.9    Depression F32. 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     Lab Results   Component Value Date/Time    HGB 14.3 2021 04:00 PM          S/P Procedure(s) (LRB):  CLOSED REDUCTION LEFT ELBOW WITH APPLICATION EXTERNAL FIXATION LEFT WRIST (Left)      Left achilles tendon injury: unable to obtain an MRI as inpatient due to ex-fix on left wrist. Will keep in left walker boot. Dario Magdaleno for transfers on right leg but will keep left ankle in boot and continue non-weight bearing on the left  and will recheck next week in the office. Fall Precautions  DC disp: consult SW for rehab placement   F/U: next Tuesday in the office with Dr. Shiva Osborne.         Signed By: ANJALI Giron  11/23/2021,  10:52 AM None

## 2022-09-19 ENCOUNTER — NURSE TRIAGE (OUTPATIENT)
Dept: OTHER | Facility: CLINIC | Age: 77
End: 2022-09-19

## 2022-09-19 ENCOUNTER — OFFICE VISIT (OUTPATIENT)
Dept: INTERNAL MEDICINE CLINIC | Facility: CLINIC | Age: 77
End: 2022-09-19
Payer: MEDICARE

## 2022-09-19 ENCOUNTER — TELEPHONE (OUTPATIENT)
Dept: INTERNAL MEDICINE CLINIC | Facility: CLINIC | Age: 77
End: 2022-09-19

## 2022-09-19 VITALS
SYSTOLIC BLOOD PRESSURE: 122 MMHG | HEIGHT: 60 IN | OXYGEN SATURATION: 96 % | DIASTOLIC BLOOD PRESSURE: 84 MMHG | WEIGHT: 149.6 LBS | HEART RATE: 75 BPM | BODY MASS INDEX: 29.37 KG/M2

## 2022-09-19 DIAGNOSIS — H60.61 CHRONIC OTITIS EXTERNA OF RIGHT EAR, UNSPECIFIED TYPE: ICD-10-CM

## 2022-09-19 DIAGNOSIS — H53.69 DIMINISHED NIGHT VISION: ICD-10-CM

## 2022-09-19 DIAGNOSIS — F33.0 MAJOR DEPRESSIVE DISORDER, RECURRENT, MILD (HCC): ICD-10-CM

## 2022-09-19 DIAGNOSIS — F41.0 ANXIETY ATTACK: ICD-10-CM

## 2022-09-19 DIAGNOSIS — H72.93 PERFORATION OF BOTH TYMPANIC MEMBRANES: ICD-10-CM

## 2022-09-19 DIAGNOSIS — R42 DIZZINESS: Primary | ICD-10-CM

## 2022-09-19 DIAGNOSIS — H91.93 DECREASED HEARING OF BOTH EARS: ICD-10-CM

## 2022-09-19 PROCEDURE — 1123F ACP DISCUSS/DSCN MKR DOCD: CPT | Performed by: NURSE PRACTITIONER

## 2022-09-19 PROCEDURE — 99214 OFFICE O/P EST MOD 30 MIN: CPT | Performed by: NURSE PRACTITIONER

## 2022-09-19 RX ORDER — ALPRAZOLAM 0.5 MG/1
0.5 TABLET ORAL NIGHTLY PRN
Qty: 30 TABLET | Refills: 0 | Status: SHIPPED | OUTPATIENT
Start: 2022-09-19 | End: 2022-10-19

## 2022-09-19 RX ORDER — ATORVASTATIN CALCIUM 40 MG/1
40 TABLET, FILM COATED ORAL DAILY
COMMUNITY
Start: 2022-07-21

## 2022-09-19 RX ORDER — CEFDINIR 300 MG/1
300 CAPSULE ORAL 2 TIMES DAILY
Qty: 20 CAPSULE | Refills: 0 | Status: SHIPPED | OUTPATIENT
Start: 2022-09-19 | End: 2022-09-29

## 2022-09-19 ASSESSMENT — PATIENT HEALTH QUESTIONNAIRE - PHQ9
SUM OF ALL RESPONSES TO PHQ QUESTIONS 1-9: 0
2. FEELING DOWN, DEPRESSED OR HOPELESS: 0
SUM OF ALL RESPONSES TO PHQ QUESTIONS 1-9: 0
SUM OF ALL RESPONSES TO PHQ9 QUESTIONS 1 & 2: 0
1. LITTLE INTEREST OR PLEASURE IN DOING THINGS: 0
SUM OF ALL RESPONSES TO PHQ QUESTIONS 1-9: 0
SUM OF ALL RESPONSES TO PHQ QUESTIONS 1-9: 0

## 2022-09-19 NOTE — PROGRESS NOTES
Pth PROGRESS NOTE    SUBJECTIVE:   Elsie Mayer is a 68 y.o. female seen for a follow up visit for   Chief Complaint   Patient presents with    Dizziness     For about a month now. Dizziness is constant, seems worse today    Referral - General     Asking for referral to Dr. Kevin CUMMINS  Dizziness: \"It is the only way I can describe it. \"  \"I'm weaving when walking. \"   Ear discomfort. , decreased hearing. Depression/anxiety:  \"I would like a referral to see Dr. Kevin Hartley. I have seen her in the past.\"      Decreased night vision:  night vision has worsened over the last few months. Halos. Can not distinguish smaller letters. Requests referral to [facility] for eye exam.         Reviewed and updated this visit by provider:  Tobacco  Allergies  Meds  Problems  Med Hx  Surg Hx  Fam Hx         Review of Systems   HENT:  Positive for ear pain and hearing loss. Negative for sneezing. Eyes:  Positive for visual disturbance (decreasing night vision). Respiratory:  Negative for cough, chest tightness, shortness of breath and wheezing. Cardiovascular:  Negative for chest pain, palpitations and leg swelling. Gastrointestinal:  Negative for abdominal pain. Musculoskeletal:  Negative for arthralgias. Skin:  Negative for color change. Allergic/Immunologic: Negative for environmental allergies. Neurological:  Negative for dizziness and headaches. Hematological:  Does not bruise/bleed easily. Psychiatric/Behavioral:  Positive for dysphoric mood and sleep disturbance. The patient is nervous/anxious. OBJECTIVE:    /84   Pulse 75   Ht 5' (1.524 m)   Wt 149 lb 9.6 oz (67.9 kg)   SpO2 96%   BMI 29.22 kg/m²      Physical Exam  Vitals and nursing note reviewed. Constitutional:       Appearance: Normal appearance. She is well-groomed. HENT:      Head: Normocephalic. Right Ear: Hearing and external ear normal. Swelling present.  Tympanic membrane is perforated and erythematous. Left Ear: Hearing and external ear normal. Tympanic membrane is perforated. Left ear retracted TM: otitis ext. Ears:      Comments: Right canal erythema posterior aspect to edge of perforated drum. Mouth/Throat:      Lips: Pink. Mouth: Mucous membranes are moist.   Eyes:      General: Lids are normal.      Conjunctiva/sclera: Conjunctivae normal.   Neck:      Vascular: No carotid bruit. Cardiovascular:      Rate and Rhythm: Normal rate and regular rhythm. Pulmonary:      Effort: Pulmonary effort is normal.      Breath sounds: Normal breath sounds. Musculoskeletal:      Right lower leg: No edema. Left lower leg: No edema. Skin:     General: Skin is warm and dry. Findings: No rash. Neurological:      Mental Status: She is alert and oriented to person, place, and time. Cranial Nerves: No dysarthria or facial asymmetry. Motor: Motor function is intact. Gait: Gait is intact. Gait normal.   Psychiatric:         Attention and Perception: Attention normal.         Mood and Affect: Mood normal.         Behavior: Behavior normal. Behavior is cooperative. ASSESSMENT and PLAN    1. Dizziness  2. Chronic otitis externa of right ear, unspecified type  -     cefdinir (OMNICEF) 300 MG capsule; Take 1 capsule by mouth 2 times daily for 10 days Take with food. , Disp-20 capsule, R-0Normal  -     [facility]  3. Perforation of both tympanic membranes  -     [facility], [place]  4. Decreased hearing of both ears  -     [facility], [place]  5. Major depressive disorder, recurrent, mild ([address]  -     External Referral to Psychiatry  -     ALPRAZolam (XANAX) 0.5 MG tablet; Take 1 tablet by mouth nightly as needed for Sleep or Anxiety for up to 30 days. , Disp-30 tablet, R-0Normal  6. Anxiety attack  -     External Referral to Psychiatry  -     ALPRAZolam (XANAX) 0.5 MG tablet;  Take 1 tablet by mouth nightly as needed for Sleep or Anxiety for up to 30 days. , Disp-30 tablet, R-0Normal  7. Diminished night vision  -     [facility] [facility]    Return for Follow-Up, regularly scheduled appointment or sooner prn.  the following changes in treatment are made: as above  lab results and schedule of future lab studies reviewed with patient  reviewed diet, exercise and weight control  cardiovascular risk and specific lipid/LDL goals reviewed  reviewed medications and side effects in detail      RUDDY Stuart - NP    Dictated using voice recognition software.  Proofread, but unrecognized voice recognition errors may exist.

## 2022-09-19 NOTE — TELEPHONE ENCOUNTER
Received call from Chastity Reyes at [facility] with [employer]. Subjective: Caller states \"for a month been having dizziness. Chapis Dojaney a year ago and did checked my head and there was no bleed. I am just foggy\"     Current Symptoms: Dizziness    Onset: 1 month ago;     Associated Symptoms: NA    Pain Severity: 0/10; N/A; none    Temperature:   Denies Fever    What has been tried: Nothing     LMP: NA Pregnant: NA    Recommended disposition: Go to [facility]    Care advice provided, patient verbalizes understanding; denies any other questions or concerns; instructed to call back for any new or worsening symptoms. Patient/Caller agrees with recommended disposition; writer provided warm transfer to Yamilka Hernandez at [facility] for appointment scheduling     Attention Provider: Thank you for allowing me to participate in the care of your patient. The patient was connected to triage in response to information provided to the ECC/PSC. Please do not respond through this encounter as the response is not directed to a shared pool.           Reason for Disposition   Lightheadedness (dizziness) present now, after 2 hours of rest and fluids    Protocols used: Dizziness-ADULT-[place]

## 2022-09-21 PROBLEM — F11.99 OPIOID USE, UNSPECIFIED WITH UNSPECIFIED OPIOID-INDUCED DISORDER (HCC): Status: RESOLVED | Noted: 2021-12-14 | Resolved: 2022-09-21

## 2022-10-03 ASSESSMENT — ENCOUNTER SYMPTOMS
ABDOMINAL PAIN: 0
COLOR CHANGE: 0
CHEST TIGHTNESS: 0
COUGH: 0
SHORTNESS OF BREATH: 0
WHEEZING: 0

## 2022-10-19 ENCOUNTER — OFFICE VISIT (OUTPATIENT)
Dept: ENT CLINIC | Age: 77
End: 2022-10-19
Payer: MEDICARE

## 2022-10-19 VITALS
WEIGHT: 152 LBS | SYSTOLIC BLOOD PRESSURE: 115 MMHG | DIASTOLIC BLOOD PRESSURE: 86 MMHG | BODY MASS INDEX: 29.84 KG/M2 | HEIGHT: 60 IN

## 2022-10-19 DIAGNOSIS — H72.91 TYMPANIC MEMBRANE PERFORATION, RIGHT: Primary | ICD-10-CM

## 2022-10-19 DIAGNOSIS — H81.20 VESTIBULAR NEURONITIS, UNSPECIFIED LATERALITY: ICD-10-CM

## 2022-10-19 PROCEDURE — 99204 OFFICE O/P NEW MOD 45 MIN: CPT | Performed by: OTOLARYNGOLOGY

## 2022-10-19 PROCEDURE — 1123F ACP DISCUSS/DSCN MKR DOCD: CPT | Performed by: OTOLARYNGOLOGY

## 2022-10-19 ASSESSMENT — ENCOUNTER SYMPTOMS
EYES NEGATIVE: 1
ALLERGIC/IMMUNOLOGIC NEGATIVE: 1
GASTROINTESTINAL NEGATIVE: 1
RESPIRATORY NEGATIVE: 1

## 2022-10-19 NOTE — PROGRESS NOTES
Chief Complaint   Patient presents with    New Patient     Started with dizziness for 1 mth, on abx for an infection, cerumen, hl, wears hearing aids        HPI:  Hayley Morgan is a 68 y.o. female seen today in initial consultation for dizziness. She presented about 2 months ago with gradual onset dizziness that lasted for approximately 1 month. She did not have any room spinning vertigo at initial presentation but she became gradually more dizzy over a several day period. She describes dizziness that would last all day long, including at night. There was no acute change in hearing, tinnitus, otalgia, or otorrhea during this time period. She is a former patient of Dr. Leonor Kohli and had apparently undergone a series of intratympanic steroid injections in her right ear multiple years ago. She has since developed a TM perforation on the right side. She followed up with her PCP after the dizziness started and underwent an ear cleaning due to cerumen impaction. She was also started on oral antibiotics. Her dizziness has improved over the last month and today there is just mild dizziness with quick head movements. She wears hearing aids in both ears. Past Medical History, Past Surgical History, Family history, Social History, and Medications were all reviewed with the patient today and updated as necessary.      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 meloxicam (MOBIC) 15 MG tablet Take 1 tablet by mouth daily    citalopram (CELEXA) 20 MG tablet Take 1 tablet by mouth daily    alendronate (FOSAMAX) 70 MG tablet Take 1 tablet by mouth every 7 days    pantoprazole (PROTONIX) 40 MG tablet Take 1 tablet by mouth daily    Multiple Vitamin (MULTIVITAMIN PO) Take 1 tablet by mouth daily    acetaminophen (TYLENOL) 500 MG tablet Take 500 mg by mouth every 6 hours as needed    clotrimazole (LOTRIMIN) 1 % cream Apply topically 2 times daily as needed    dexamethasone 0.5 MG/5ML elixir RINSE 5 MLS BY MOUTH FOUR TO FIVE TIMES A DAY FOR 1 MINUTE AND SPIT OUT    fluticasone (FLONASE) 50 MCG/ACT nasal spray 2 sprays by Nasal route daily     No current facility-administered medications for this visit.      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    Lichen planus     oral    Mixed hyperlipidemia 3/9/2015    Nonspecific elevation of levels of transaminase or lactic acid dehydrogenase (LDH) 3/9/2015    Osteoarthritis of multiple joints 3/15/2016    Osteoporosis 12/4/2015    Pain in joint, lower leg 3/9/2015    Plantar fascial fibromatosis     Prediabetes 06/22/2016    no current meds, pt doesnt monitor BS    SNHL (sensorineural hearing loss) Sudden hearing loss of both ears     HA to tawny ears    Tinnitus of both ears      Social History     Tobacco Use    Smoking status: Never    Smokeless tobacco: Never   Substance Use Topics    Alcohol use: No     Alcohol/week: 0.0 standard drinks     Past Surgical History:   Procedure Laterality Date    COLONOSCOPY      HYSTERECTOMY (CERVIX STATUS UNKNOWN)  1980s    ORTHOPEDIC SURGERY Left     external fixator    ORTHOPEDIC SURGERY Left 01/2022    LEFT WRIST OPEN REDUCTION INTERNAL FIXATION WITH REMOVAL OF EX FIX CHOICE ANES     ORTHOPEDIC SURGERY Right     wrist     TONSILLECTOMY  1965     Family History   Problem Relation Age of Onset    Cancer Sister         uterus    Heart Disease Brother     Parkinsonism Father     Diabetes Mother     Hypertension Mother     Colon Cancer Mother     Cancer Other         Uterine cancer (First degree Relatives)    Breast Cancer Sister 61    Cancer Sister     Heart Disease Mother     Other Sister         Nodule removed in stomach and \"bled out\"        ROS:    Review of Systems   HENT:  Positive for hearing loss and tinnitus. Eyes: Negative. Respiratory: Negative. Cardiovascular: Negative. Gastrointestinal: Negative. Endocrine: Negative. Genitourinary: Negative. Musculoskeletal: Negative. Skin: Negative. Allergic/Immunologic: Negative. Neurological:  Positive for dizziness. Hematological: Negative. Psychiatric/Behavioral: Negative. PHYSICAL EXAM:    /86 (Site: Left Upper Arm, Position: Sitting)   Ht 5' (1.524 m)   Wt 152 lb (68.9 kg)   BMI 29.69 kg/m²     General: NAD, well-appearing  Neuro: No gross neuro deficits. CN's II-XII intact. No facial weakness. Eyes: EOMI. Pupils reactive. No periorbital edema/ecchymosis. No nystagmus. Skin: No facial erythema, rashes or concerning lesions. Nose: No external deviations or saddling.  Intranasally, septum is midline without perforations, nasal mucosa appears healthy with no erythema,

## 2022-12-10 DIAGNOSIS — M15.9 PRIMARY OSTEOARTHRITIS INVOLVING MULTIPLE JOINTS: ICD-10-CM

## 2022-12-12 DIAGNOSIS — E78.2 MIXED HYPERLIPIDEMIA: ICD-10-CM

## 2022-12-12 DIAGNOSIS — M25.572 PAIN IN LEFT ANKLE AND JOINTS OF LEFT FOOT: ICD-10-CM

## 2022-12-12 DIAGNOSIS — E55.9 VITAMIN D DEFICIENCY: ICD-10-CM

## 2022-12-12 LAB
25(OH)D3 SERPL-MCNC: 48.9 NG/ML (ref 30–100)
ALBUMIN SERPL-MCNC: 4.1 G/DL (ref 3.2–4.6)
ALBUMIN/GLOB SERPL: 1.4 (ref 0.4–1.6)
ALP SERPL-CCNC: 84 U/L (ref 50–136)
ALT SERPL-CCNC: 34 U/L (ref 12–65)
ANION GAP SERPL CALC-SCNC: 8 MMOL/L (ref 2–11)
AST SERPL-CCNC: 22 U/L (ref 15–37)
BASOPHILS # BLD: 0.1 K/UL (ref 0–0.2)
BASOPHILS NFR BLD: 1 % (ref 0–2)
BILIRUB SERPL-MCNC: 0.4 MG/DL (ref 0.2–1.1)
BUN SERPL-MCNC: 18 MG/DL (ref 8–23)
CALCIUM SERPL-MCNC: 9.8 MG/DL (ref 8.3–10.4)
CHLORIDE SERPL-SCNC: 106 MMOL/L (ref 101–110)
CHOLEST SERPL-MCNC: 181 MG/DL
CO2 SERPL-SCNC: 28 MMOL/L (ref 21–32)
CREAT SERPL-MCNC: 0.8 MG/DL (ref 0.6–1)
DIFFERENTIAL METHOD BLD: NORMAL
EOSINOPHIL # BLD: 0.2 K/UL (ref 0–0.8)
EOSINOPHIL NFR BLD: 3 % (ref 0.5–7.8)
ERYTHROCYTE [DISTWIDTH] IN BLOOD BY AUTOMATED COUNT: 12.6 % (ref 11.9–14.6)
GLOBULIN SER CALC-MCNC: 2.9 G/DL (ref 2.8–4.5)
GLUCOSE SERPL-MCNC: 95 MG/DL (ref 65–100)
HCT VFR BLD AUTO: 44.9 % (ref 35.8–46.3)
HDLC SERPL-MCNC: 57 MG/DL (ref 40–60)
HDLC SERPL: 3.2
HGB BLD-MCNC: 14.3 G/DL (ref 11.7–15.4)
IMM GRANULOCYTES # BLD AUTO: 0 K/UL (ref 0–0.5)
IMM GRANULOCYTES NFR BLD AUTO: 0 % (ref 0–5)
LDLC SERPL CALC-MCNC: 83.6 MG/DL
LYMPHOCYTES # BLD: 2.7 K/UL (ref 0.5–4.6)
LYMPHOCYTES NFR BLD: 35 % (ref 13–44)
MCH RBC QN AUTO: 31 PG (ref 26.1–32.9)
MCHC RBC AUTO-ENTMCNC: 31.8 G/DL (ref 31.4–35)
MCV RBC AUTO: 97.2 FL (ref 82–102)
MONOCYTES # BLD: 0.5 K/UL (ref 0.1–1.3)
MONOCYTES NFR BLD: 7 % (ref 4–12)
NEUTS SEG # BLD: 4.1 K/UL (ref 1.7–8.2)
NEUTS SEG NFR BLD: 54 % (ref 43–78)
NRBC # BLD: 0 K/UL (ref 0–0.2)
PLATELET # BLD AUTO: 280 K/UL (ref 150–450)
PMV BLD AUTO: 10 FL (ref 9.4–12.3)
POTASSIUM SERPL-SCNC: 4.8 MMOL/L (ref 3.5–5.1)
PROT SERPL-MCNC: 7 G/DL (ref 6.3–8.2)
RBC # BLD AUTO: 4.62 M/UL (ref 4.05–5.2)
SODIUM SERPL-SCNC: 142 MMOL/L (ref 133–143)
TRIGL SERPL-MCNC: 202 MG/DL (ref 35–150)
VLDLC SERPL CALC-MCNC: 40.4 MG/DL (ref 6–23)
WBC # BLD AUTO: 7.5 K/UL (ref 4.3–11.1)

## 2022-12-12 RX ORDER — MELOXICAM 15 MG/1
TABLET ORAL
Qty: 90 TABLET | Refills: 0 | OUTPATIENT
Start: 2022-12-12

## 2022-12-19 ENCOUNTER — OFFICE VISIT (OUTPATIENT)
Dept: INTERNAL MEDICINE CLINIC | Facility: CLINIC | Age: 77
End: 2022-12-19
Payer: MEDICARE

## 2022-12-19 VITALS
OXYGEN SATURATION: 94 % | SYSTOLIC BLOOD PRESSURE: 132 MMHG | BODY MASS INDEX: 30.97 KG/M2 | DIASTOLIC BLOOD PRESSURE: 80 MMHG | WEIGHT: 158.6 LBS | HEART RATE: 67 BPM

## 2022-12-19 DIAGNOSIS — Z79.1 NSAID LONG-TERM USE: ICD-10-CM

## 2022-12-19 DIAGNOSIS — M15.9 PRIMARY OSTEOARTHRITIS INVOLVING MULTIPLE JOINTS: ICD-10-CM

## 2022-12-19 DIAGNOSIS — K21.9 GASTROESOPHAGEAL REFLUX DISEASE, UNSPECIFIED WHETHER ESOPHAGITIS PRESENT: ICD-10-CM

## 2022-12-19 DIAGNOSIS — E78.2 MIXED HYPERLIPIDEMIA: Primary | ICD-10-CM

## 2022-12-19 PROCEDURE — 1123F ACP DISCUSS/DSCN MKR DOCD: CPT | Performed by: NURSE PRACTITIONER

## 2022-12-19 PROCEDURE — 99214 OFFICE O/P EST MOD 30 MIN: CPT | Performed by: NURSE PRACTITIONER

## 2022-12-19 RX ORDER — ACETAMINOPHEN 160 MG
TABLET,DISINTEGRATING ORAL
COMMUNITY

## 2022-12-19 RX ORDER — ALPRAZOLAM 0.5 MG/1
TABLET ORAL
COMMUNITY
Start: 2022-12-15

## 2022-12-19 RX ORDER — MELOXICAM 15 MG/1
15 TABLET ORAL DAILY
Qty: 90 TABLET | Refills: 1 | Status: SHIPPED | OUTPATIENT
Start: 2022-12-19

## 2022-12-19 ASSESSMENT — PATIENT HEALTH QUESTIONNAIRE - PHQ9
SUM OF ALL RESPONSES TO PHQ QUESTIONS 1-9: 6
9. THOUGHTS THAT YOU WOULD BE BETTER OFF DEAD, OR OF HURTING YOURSELF: 0
2. FEELING DOWN, DEPRESSED OR HOPELESS: 1
8. MOVING OR SPEAKING SO SLOWLY THAT OTHER PEOPLE COULD HAVE NOTICED. OR THE OPPOSITE, BEING SO FIGETY OR RESTLESS THAT YOU HAVE BEEN MOVING AROUND A LOT MORE THAN USUAL: 0
SUM OF ALL RESPONSES TO PHQ QUESTIONS 1-9: 6
5. POOR APPETITE OR OVEREATING: 2
SUM OF ALL RESPONSES TO PHQ QUESTIONS 1-9: 6
4. FEELING TIRED OR HAVING LITTLE ENERGY: 1
SUM OF ALL RESPONSES TO PHQ QUESTIONS 1-9: 6
7. TROUBLE CONCENTRATING ON THINGS, SUCH AS READING THE NEWSPAPER OR WATCHING TELEVISION: 0
10. IF YOU CHECKED OFF ANY PROBLEMS, HOW DIFFICULT HAVE THESE PROBLEMS MADE IT FOR YOU TO DO YOUR WORK, TAKE CARE OF THINGS AT HOME, OR GET ALONG WITH OTHER PEOPLE: 1
6. FEELING BAD ABOUT YOURSELF - OR THAT YOU ARE A FAILURE OR HAVE LET YOURSELF OR YOUR FAMILY DOWN: 0
SUM OF ALL RESPONSES TO PHQ9 QUESTIONS 1 & 2: 2
1. LITTLE INTEREST OR PLEASURE IN DOING THINGS: 1
3. TROUBLE FALLING OR STAYING ASLEEP: 1

## 2022-12-19 ASSESSMENT — ENCOUNTER SYMPTOMS
CHEST TIGHTNESS: 0
WHEEZING: 0
ABDOMINAL PAIN: 0
SHORTNESS OF BREATH: 0

## 2022-12-19 NOTE — PROGRESS NOTES
PROGRESS NOTE    SUBJECTIVE:   Ed Vargas is a 68 y.o. female seen for a follow up visit for   Chief Complaint   Patient presents with    Follow-up Chronic Condition     3-4 month follow up     Cholesterol Problem  This is a chronic problem. Episode onset: 2005. Pertinent negatives include no chest pain. Treatments tried: Pravastatin 20 mg ineffective; improved with atorvastatin 40 mg qhs. Tolerating without myalgias. Depression  This is a chronic problem. Episode onset: 0. 's health issues and the passing of her sister aggravates the symptoms. The symptoms are relieved by medications. Treatments tried: celexa. Referred to psych and has been seeing Dr. Leah Hooks and she believes this is beneficial.  Dr. Leah Hooks has taken over the alprazolam and celexa prescriptions. GERD  Patient complains of  reflux which is worse at night Symptoms have been present for approximately several weeks. Symptoms include  regurgitation of undigested food. Symptoms appear to be worsened by large meals and lying down after eating. Risk factors present for GERD include none. Risk factors absent for GERD are NSAID use. Studies performed so far include none. Treatments tried so far include proton pump inhibitor: pantoprazole. Results of treatment: improvement during day but worse with regurgitation of undigested food at bedtime. Currently, the symptoms are moderate and occur randomly. Admits to eating until time to go to bed. Reviewed and updated this visit by provider:  Tobacco  Allergies  Meds  Problems  Med Hx  Surg Hx  Fam Hx         Review of Systems   Constitutional:  Negative for chills, fatigue and fever. Respiratory:  Negative for chest tightness, shortness of breath and wheezing. Cardiovascular:  Negative for chest pain, palpitations and leg swelling. Gastrointestinal:  Negative for abdominal pain.         Positive for reflux when in bed for the night   Endocrine: Negative for polydipsia, polyphagia and polyuria. Genitourinary:  Negative for frequency. Musculoskeletal:  Negative for gait problem. Skin:  Negative for rash. Neurological:  Negative for weakness, numbness and headaches. Psychiatric/Behavioral:  Negative for dysphoric mood. The patient is not nervous/anxious. OBJECTIVE:    /80 (Site: Left Upper Arm, Position: Sitting, Cuff Size: Small Adult)   Pulse 67   Wt 158 lb 9.6 oz (71.9 kg)   SpO2 94%   BMI 30.97 kg/m²      Physical Exam  Vitals and nursing note reviewed. Constitutional:       Appearance: Normal appearance. HENT:      Head: Normocephalic. Mouth/Throat:      Lips: Pink. Mouth: Mucous membranes are moist.   Eyes:      General: Lids are normal.   Neck:      Vascular: No carotid bruit. Cardiovascular:      Rate and Rhythm: Normal rate and regular rhythm. Pulmonary:      Effort: Pulmonary effort is normal.      Breath sounds: Normal breath sounds. Musculoskeletal:      Cervical back: Neck supple. Right lower leg: No edema. Left lower leg: No edema. Skin:     General: Skin is warm and dry. Neurological:      Mental Status: She is alert and oriented to person, place, and time. Mental status is at baseline. Gait: Gait normal.   Psychiatric:         Mood and Affect: Mood normal.         Behavior: Behavior normal.        ASSESSMENT and PLAN    1. Mixed hyperlipidemia  -     Comprehensive Metabolic Panel; Future  -     Lipid Panel; Future  2. Primary osteoarthritis involving multiple joints  -     meloxicam (MOBIC) 15 MG tablet; Take 1 tablet by mouth daily, Disp-90 tablet, R-1Normal  -     Comprehensive Metabolic Panel; Future  3. NSAID long-term use  -     Comprehensive Metabolic Panel; Future  4. Gastroesophageal reflux disease, unspecified whether esophagitis present  Continue pantoprazole. Return in about 3 months (around 3/19/2023) for Follow-Up, HLD, GERD, with labs.   current treatment plan is effective, no change in therapy  lab results and schedule of future lab studies reviewed with patient  reviewed diet, exercise and weight control  cardiovascular risk and specific lipid/LDL goals reviewed  reviewed medications and side effects in detail      RUDDY Jackson - GURPREET    Dictated using voice recognition software.  Proofread, but unrecognized voice recognition errors may exist.

## 2022-12-19 NOTE — PATIENT INSTRUCTIONS
Decrease the meloxicam to every other night. Take meloxicam at supper instead of bedtime. No eating within 2 hours of bedtime.     If no improvement in reflux symptoms will refer back to gastroenterologist.

## 2023-03-14 DIAGNOSIS — E78.2 MIXED HYPERLIPIDEMIA: ICD-10-CM

## 2023-03-14 DIAGNOSIS — Z79.1 NSAID LONG-TERM USE: ICD-10-CM

## 2023-03-14 DIAGNOSIS — M15.9 PRIMARY OSTEOARTHRITIS INVOLVING MULTIPLE JOINTS: ICD-10-CM

## 2023-03-14 LAB
ALBUMIN SERPL-MCNC: 4.2 G/DL (ref 3.2–4.6)
ALBUMIN/GLOB SERPL: 1.5 (ref 0.4–1.6)
ALP SERPL-CCNC: 65 U/L (ref 50–136)
ALT SERPL-CCNC: 39 U/L (ref 12–65)
ANION GAP SERPL CALC-SCNC: 1 MMOL/L (ref 2–11)
AST SERPL-CCNC: 28 U/L (ref 15–37)
BILIRUB SERPL-MCNC: 0.4 MG/DL (ref 0.2–1.1)
BUN SERPL-MCNC: 21 MG/DL (ref 8–23)
CALCIUM SERPL-MCNC: 9.5 MG/DL (ref 8.3–10.4)
CHLORIDE SERPL-SCNC: 107 MMOL/L (ref 101–110)
CHOLEST SERPL-MCNC: 202 MG/DL
CO2 SERPL-SCNC: 29 MMOL/L (ref 21–32)
CREAT SERPL-MCNC: 0.8 MG/DL (ref 0.6–1)
GLOBULIN SER CALC-MCNC: 2.8 G/DL (ref 2.8–4.5)
GLUCOSE SERPL-MCNC: 111 MG/DL (ref 65–100)
HDLC SERPL-MCNC: 51 MG/DL (ref 40–60)
HDLC SERPL: 4
LDLC SERPL CALC-MCNC: 113.4 MG/DL
POTASSIUM SERPL-SCNC: 4.4 MMOL/L (ref 3.5–5.1)
PROT SERPL-MCNC: 7 G/DL (ref 6.3–8.2)
SODIUM SERPL-SCNC: 137 MMOL/L (ref 133–143)
TRIGL SERPL-MCNC: 188 MG/DL (ref 35–150)
VLDLC SERPL CALC-MCNC: 37.6 MG/DL (ref 6–23)

## 2023-03-20 ENCOUNTER — OFFICE VISIT (OUTPATIENT)
Dept: INTERNAL MEDICINE CLINIC | Facility: CLINIC | Age: 78
End: 2023-03-20
Payer: MEDICARE

## 2023-03-20 VITALS
DIASTOLIC BLOOD PRESSURE: 80 MMHG | HEART RATE: 76 BPM | OXYGEN SATURATION: 98 % | WEIGHT: 159 LBS | HEIGHT: 60 IN | BODY MASS INDEX: 31.22 KG/M2 | RESPIRATION RATE: 14 BRPM | SYSTOLIC BLOOD PRESSURE: 150 MMHG

## 2023-03-20 DIAGNOSIS — E78.2 MIXED HYPERLIPIDEMIA: Primary | ICD-10-CM

## 2023-03-20 DIAGNOSIS — R73.01 ELEVATED FASTING GLUCOSE: ICD-10-CM

## 2023-03-20 DIAGNOSIS — R03.0 ELEVATED BLOOD PRESSURE READING IN OFFICE WITHOUT DIAGNOSIS OF HYPERTENSION: ICD-10-CM

## 2023-03-20 PROCEDURE — 1123F ACP DISCUSS/DSCN MKR DOCD: CPT | Performed by: NURSE PRACTITIONER

## 2023-03-20 PROCEDURE — 99214 OFFICE O/P EST MOD 30 MIN: CPT | Performed by: NURSE PRACTITIONER

## 2023-03-20 RX ORDER — PRAVASTATIN SODIUM 40 MG
40 TABLET ORAL DAILY
Qty: 30 TABLET | Status: CANCELLED | OUTPATIENT
Start: 2023-03-20

## 2023-03-20 RX ORDER — PRAVASTATIN SODIUM 40 MG
40 TABLET ORAL DAILY
COMMUNITY
End: 2023-03-20 | Stop reason: SDUPTHER

## 2023-03-20 RX ORDER — PRAVASTATIN SODIUM 40 MG
40 TABLET ORAL EVERY EVENING
Qty: 90 TABLET | Refills: 3 | Status: SHIPPED | OUTPATIENT
Start: 2023-03-20

## 2023-03-20 SDOH — ECONOMIC STABILITY: FOOD INSECURITY: WITHIN THE PAST 12 MONTHS, YOU WORRIED THAT YOUR FOOD WOULD RUN OUT BEFORE YOU GOT MONEY TO BUY MORE.: NEVER TRUE

## 2023-03-20 SDOH — ECONOMIC STABILITY: INCOME INSECURITY: HOW HARD IS IT FOR YOU TO PAY FOR THE VERY BASICS LIKE FOOD, HOUSING, MEDICAL CARE, AND HEATING?: NOT HARD AT ALL

## 2023-03-20 SDOH — ECONOMIC STABILITY: FOOD INSECURITY: WITHIN THE PAST 12 MONTHS, THE FOOD YOU BOUGHT JUST DIDN'T LAST AND YOU DIDN'T HAVE MONEY TO GET MORE.: NEVER TRUE

## 2023-03-20 SDOH — ECONOMIC STABILITY: HOUSING INSECURITY
IN THE LAST 12 MONTHS, WAS THERE A TIME WHEN YOU DID NOT HAVE A STEADY PLACE TO SLEEP OR SLEPT IN A SHELTER (INCLUDING NOW)?: NO

## 2023-03-20 ASSESSMENT — PATIENT HEALTH QUESTIONNAIRE - PHQ9
3. TROUBLE FALLING OR STAYING ASLEEP: 0
9. THOUGHTS THAT YOU WOULD BE BETTER OFF DEAD, OR OF HURTING YOURSELF: 0
5. POOR APPETITE OR OVEREATING: 0
10. IF YOU CHECKED OFF ANY PROBLEMS, HOW DIFFICULT HAVE THESE PROBLEMS MADE IT FOR YOU TO DO YOUR WORK, TAKE CARE OF THINGS AT HOME, OR GET ALONG WITH OTHER PEOPLE: 0
SUM OF ALL RESPONSES TO PHQ9 QUESTIONS 1 & 2: 0
SUM OF ALL RESPONSES TO PHQ QUESTIONS 1-9: 0
2. FEELING DOWN, DEPRESSED OR HOPELESS: 0
DEPRESSION UNABLE TO ASSESS: PT REFUSES
SUM OF ALL RESPONSES TO PHQ QUESTIONS 1-9: 0
6. FEELING BAD ABOUT YOURSELF - OR THAT YOU ARE A FAILURE OR HAVE LET YOURSELF OR YOUR FAMILY DOWN: 0
SUM OF ALL RESPONSES TO PHQ QUESTIONS 1-9: 0
7. TROUBLE CONCENTRATING ON THINGS, SUCH AS READING THE NEWSPAPER OR WATCHING TELEVISION: 0
1. LITTLE INTEREST OR PLEASURE IN DOING THINGS: 0
8. MOVING OR SPEAKING SO SLOWLY THAT OTHER PEOPLE COULD HAVE NOTICED. OR THE OPPOSITE, BEING SO FIGETY OR RESTLESS THAT YOU HAVE BEEN MOVING AROUND A LOT MORE THAN USUAL: 0
4. FEELING TIRED OR HAVING LITTLE ENERGY: 0
SUM OF ALL RESPONSES TO PHQ QUESTIONS 1-9: 0

## 2023-03-20 ASSESSMENT — ENCOUNTER SYMPTOMS
CHEST TIGHTNESS: 0
SHORTNESS OF BREATH: 0
ABDOMINAL PAIN: 0
WHEEZING: 0

## 2023-03-20 NOTE — PROGRESS NOTES
in the muscles and therefore she discontinued the atorvastatin and started pravastatin. LDL is risen back up to 113. Recheck on return. On this date 03/20/23 I have spent 28 minutes reviewing previous notes, test results and face to face with the patient. Over 50% of today's office visit was spent in face to face time in counseling reviewing test results, importance of compliance, education about disease process, benefits and side-effects of medications and follow-up plan. RUDDY Oliveira - NP    Dictated using voice recognition software.  Proofread, but unrecognized voice recognition errors may exist.

## 2023-05-22 ENCOUNTER — OFFICE VISIT (OUTPATIENT)
Dept: INTERNAL MEDICINE CLINIC | Facility: CLINIC | Age: 78
End: 2023-05-22
Payer: MEDICARE

## 2023-05-22 VITALS
WEIGHT: 158 LBS | DIASTOLIC BLOOD PRESSURE: 92 MMHG | OXYGEN SATURATION: 95 % | SYSTOLIC BLOOD PRESSURE: 140 MMHG | BODY MASS INDEX: 30.86 KG/M2 | HEART RATE: 76 BPM

## 2023-05-22 DIAGNOSIS — M15.9 PRIMARY OSTEOARTHRITIS INVOLVING MULTIPLE JOINTS: ICD-10-CM

## 2023-05-22 DIAGNOSIS — R82.998 GREEN-COLORED URINE: ICD-10-CM

## 2023-05-22 DIAGNOSIS — R10.2 PELVIC PAIN: ICD-10-CM

## 2023-05-22 DIAGNOSIS — N90.89 VULVAR IRRITATION: Primary | ICD-10-CM

## 2023-05-22 DIAGNOSIS — R03.0 ELEVATED BLOOD PRESSURE READING IN OFFICE WITHOUT DIAGNOSIS OF HYPERTENSION: ICD-10-CM

## 2023-05-22 LAB
APPEARANCE UR: CLEAR
BACTERIA URNS QL MICRO: ABNORMAL /HPF
BILIRUB UR QL: NEGATIVE
CASTS URNS QL MICRO: 0 /LPF
COLOR UR: ABNORMAL
CRYSTALS URNS QL MICRO: 0 /LPF
EPI CELLS #/AREA URNS HPF: ABNORMAL /HPF
GLUCOSE UR STRIP.AUTO-MCNC: NEGATIVE MG/DL
HGB UR QL STRIP: NEGATIVE
KETONES UR QL STRIP.AUTO: NEGATIVE MG/DL
LEUKOCYTE ESTERASE UR QL STRIP.AUTO: ABNORMAL
MUCOUS THREADS URNS QL MICRO: 0 /LPF
NITRITE UR QL STRIP.AUTO: NEGATIVE
PH UR STRIP: 6.5 (ref 5–9)
PROT UR STRIP-MCNC: NEGATIVE MG/DL
RBC #/AREA URNS HPF: ABNORMAL /HPF
SP GR UR REFRACTOMETRY: 1.01 (ref 1–1.02)
URINE CULTURE IF INDICATED: ABNORMAL
UROBILINOGEN UR QL STRIP.AUTO: 0.2 EU/DL (ref 0.2–1)
WBC URNS QL MICRO: ABNORMAL /HPF

## 2023-05-22 PROCEDURE — 1123F ACP DISCUSS/DSCN MKR DOCD: CPT | Performed by: NURSE PRACTITIONER

## 2023-05-22 PROCEDURE — 99214 OFFICE O/P EST MOD 30 MIN: CPT | Performed by: NURSE PRACTITIONER

## 2023-05-22 RX ORDER — MELOXICAM 7.5 MG/1
7.5 TABLET ORAL DAILY
Qty: 90 TABLET | Refills: 0 | Status: SHIPPED | OUTPATIENT
Start: 2023-05-22

## 2023-05-22 ASSESSMENT — PATIENT HEALTH QUESTIONNAIRE - PHQ9
SUM OF ALL RESPONSES TO PHQ QUESTIONS 1-9: 3
2. FEELING DOWN, DEPRESSED OR HOPELESS: 0
7. TROUBLE CONCENTRATING ON THINGS, SUCH AS READING THE NEWSPAPER OR WATCHING TELEVISION: 0
4. FEELING TIRED OR HAVING LITTLE ENERGY: 3
6. FEELING BAD ABOUT YOURSELF - OR THAT YOU ARE A FAILURE OR HAVE LET YOURSELF OR YOUR FAMILY DOWN: 0
10. IF YOU CHECKED OFF ANY PROBLEMS, HOW DIFFICULT HAVE THESE PROBLEMS MADE IT FOR YOU TO DO YOUR WORK, TAKE CARE OF THINGS AT HOME, OR GET ALONG WITH OTHER PEOPLE: 0
SUM OF ALL RESPONSES TO PHQ QUESTIONS 1-9: 3
5. POOR APPETITE OR OVEREATING: 0
SUM OF ALL RESPONSES TO PHQ QUESTIONS 1-9: 3
8. MOVING OR SPEAKING SO SLOWLY THAT OTHER PEOPLE COULD HAVE NOTICED. OR THE OPPOSITE, BEING SO FIGETY OR RESTLESS THAT YOU HAVE BEEN MOVING AROUND A LOT MORE THAN USUAL: 0
SUM OF ALL RESPONSES TO PHQ QUESTIONS 1-9: 3
SUM OF ALL RESPONSES TO PHQ9 QUESTIONS 1 & 2: 0
9. THOUGHTS THAT YOU WOULD BE BETTER OFF DEAD, OR OF HURTING YOURSELF: 0
1. LITTLE INTEREST OR PLEASURE IN DOING THINGS: 0
3. TROUBLE FALLING OR STAYING ASLEEP: 0

## 2023-05-23 ENCOUNTER — TELEPHONE (OUTPATIENT)
Dept: INTERNAL MEDICINE CLINIC | Facility: CLINIC | Age: 78
End: 2023-05-23

## 2023-05-23 NOTE — TELEPHONE ENCOUNTER
LVM per Krista Gore' notes \"The urinalysis does not show any significant amount of white cells or red blood cells. No evidence of infection.   The urine color is noted to be yellow\"

## 2023-06-01 ENCOUNTER — OFFICE VISIT (OUTPATIENT)
Dept: GYNECOLOGY | Age: 78
End: 2023-06-01

## 2023-06-01 VITALS
SYSTOLIC BLOOD PRESSURE: 130 MMHG | HEIGHT: 60 IN | WEIGHT: 156 LBS | BODY MASS INDEX: 30.63 KG/M2 | DIASTOLIC BLOOD PRESSURE: 80 MMHG

## 2023-06-01 DIAGNOSIS — N89.8 VAGINAL ODOR: Primary | ICD-10-CM

## 2023-06-01 DIAGNOSIS — N95.2 VAGINAL ATROPHY: ICD-10-CM

## 2023-06-01 RX ORDER — ESTRADIOL 0.1 MG/G
CREAM VAGINAL
Qty: 42.5 G | Refills: 5 | Status: SHIPPED | OUTPATIENT
Start: 2023-06-01

## 2023-06-01 NOTE — PROGRESS NOTES
\HPI  Bharat Velazquez is a 68 y.o. female seen for irritation around the clitoral area. Sometimes she will see blood when it gets irritated. She saw a provider about a year ago and was rxed estrogen cream.  The bleeding has gotten better since using this. Past Medical History, Past Surgical History, Family history, Social History, and Medications were all reviewed with the patient today and updated as necessary. Current Outpatient Medications   Medication Sig    estradiol (ESTRACE VAGINAL) 0.1 MG/GM vaginal cream Use one gram vaginally 2x weekly at bedtime    meloxicam (MOBIC) 7.5 MG tablet Take 1 tablet by mouth daily Take with food. pravastatin (PRAVACHOL) 40 MG tablet Take 1 tablet by mouth every evening    ALPRAZolam (XANAX) 0.5 MG tablet     Cholecalciferol (VITAMIN D3) 50 MCG (2000 UT) CAPS Take by mouth    citalopram (CELEXA) 20 MG tablet Take 1 tablet by mouth daily    pantoprazole (PROTONIX) 40 MG tablet Take 1 tablet by mouth daily    Multiple Vitamin (MULTIVITAMIN PO) Take 1 tablet by mouth daily    acetaminophen (TYLENOL) 500 MG tablet Take 1 tablet by mouth every 6 hours as needed    clotrimazole (LOTRIMIN) 1 % cream Apply topically 2 times daily as needed    fluticasone (FLONASE) 50 MCG/ACT nasal spray 2 sprays by Nasal route daily     No current facility-administered medications for this visit.      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

## 2023-06-05 ASSESSMENT — ENCOUNTER SYMPTOMS
ABDOMINAL DISTENTION: 0
CHEST TIGHTNESS: 0
CONSTIPATION: 0
WHEEZING: 0
ABDOMINAL PAIN: 0
DIARRHEA: 0
SHORTNESS OF BREATH: 0

## 2023-06-06 LAB
A VAGINAE DNA VAG QL NAA+PROBE: NORMAL SCORE
BVAB2 DNA VAG QL NAA+PROBE: NORMAL SCORE
C ALBICANS DNA VAG QL NAA+PROBE: NEGATIVE
C GLABRATA DNA VAG QL NAA+PROBE: NEGATIVE
MEGA1 DNA VAG QL NAA+PROBE: NORMAL SCORE
SPECIMEN SOURCE: NORMAL
T VAGINALIS RRNA SPEC QL NAA+PROBE: NEGATIVE

## 2023-06-14 NOTE — TELEPHONE ENCOUNTER
----- Message from RUDDY Harris NP sent at 5/23/2023 12:51 AM EDT -----  The urinalysis does not show any significant amount of white cells or red blood cells. No evidence of infection. The urine color is noted to be yellow. Pt calling back.   Spoke with patient regarding her upcoming IUD insertion. Date of appointment 6/30/2023 @ 11:30 AM with     Pre-Appointment Education  -If you are not currently on birth control, please make sure you have protected intercourse with a condom or no intercourse for 2 weeks before the appointment.   -You may take Motrin 600mg or Tylenol (if unable to take Motrin), by mouth with food 30 min to an hour before scheduled appointment for prophylactic pain control during procedure.   -You will be asked to provide a urine sample upon arriving at your appointment to verify you are not pregnant. Following the procedure you may experience cramping.     Post-Insertion Instructions   -Please take Motrin 600mg or Tylenol every 4-6 hours as needed, you may also use a warm compress for cramping.   -You may also continue with light and or irregular bleeding after the procedure, this will improve with time.   -Please notify the office at [phone] if the cramping increases in intensity and there is no relief from taking Motrin or Tylenol.     Please let us know if you have any further questions or concerns!   Thank you.

## 2023-06-15 DIAGNOSIS — M15.9 PRIMARY OSTEOARTHRITIS INVOLVING MULTIPLE JOINTS: ICD-10-CM

## 2023-06-15 RX ORDER — MELOXICAM 15 MG/1
TABLET ORAL
Qty: 90 TABLET | Refills: 1 | OUTPATIENT
Start: 2023-06-15

## 2023-06-27 DIAGNOSIS — E78.2 MIXED HYPERLIPIDEMIA: ICD-10-CM

## 2023-06-27 DIAGNOSIS — R73.01 ELEVATED FASTING GLUCOSE: ICD-10-CM

## 2023-06-27 DIAGNOSIS — R03.0 ELEVATED BLOOD PRESSURE READING IN OFFICE WITHOUT DIAGNOSIS OF HYPERTENSION: ICD-10-CM

## 2023-06-27 LAB
ALBUMIN SERPL-MCNC: 4.4 G/DL (ref 3.2–4.6)
ALBUMIN/GLOB SERPL: 1.7 (ref 0.4–1.6)
ALP SERPL-CCNC: 76 U/L (ref 50–136)
ALT SERPL-CCNC: 39 U/L (ref 12–65)
ANION GAP SERPL CALC-SCNC: 5 MMOL/L (ref 2–11)
AST SERPL-CCNC: 30 U/L (ref 15–37)
BASOPHILS # BLD: 0.1 K/UL (ref 0–0.2)
BASOPHILS NFR BLD: 1 % (ref 0–2)
BILIRUB SERPL-MCNC: 0.4 MG/DL (ref 0.2–1.1)
BUN SERPL-MCNC: 22 MG/DL (ref 8–23)
CALCIUM SERPL-MCNC: 9.6 MG/DL (ref 8.3–10.4)
CHLORIDE SERPL-SCNC: 107 MMOL/L (ref 101–110)
CHOLEST SERPL-MCNC: 210 MG/DL
CO2 SERPL-SCNC: 28 MMOL/L (ref 21–32)
CREAT SERPL-MCNC: 0.9 MG/DL (ref 0.6–1)
DIFFERENTIAL METHOD BLD: NORMAL
EOSINOPHIL # BLD: 0.1 K/UL (ref 0–0.8)
EOSINOPHIL NFR BLD: 2 % (ref 0.5–7.8)
ERYTHROCYTE [DISTWIDTH] IN BLOOD BY AUTOMATED COUNT: 12.4 % (ref 11.9–14.6)
EST. AVERAGE GLUCOSE BLD GHB EST-MCNC: 120 MG/DL
GLOBULIN SER CALC-MCNC: 2.6 G/DL (ref 2.8–4.5)
GLUCOSE SERPL-MCNC: 116 MG/DL (ref 65–100)
HBA1C MFR BLD: 5.8 % (ref 4.8–5.6)
HCT VFR BLD AUTO: 44.5 % (ref 35.8–46.3)
HDLC SERPL-MCNC: 55 MG/DL (ref 40–60)
HDLC SERPL: 3.8
HGB BLD-MCNC: 14.5 G/DL (ref 11.7–15.4)
IMM GRANULOCYTES # BLD AUTO: 0 K/UL (ref 0–0.5)
IMM GRANULOCYTES NFR BLD AUTO: 0 % (ref 0–5)
LDLC SERPL CALC-MCNC: 113.2 MG/DL
LYMPHOCYTES # BLD: 2.4 K/UL (ref 0.5–4.6)
LYMPHOCYTES NFR BLD: 35 % (ref 13–44)
MCH RBC QN AUTO: 31.3 PG (ref 26.1–32.9)
MCHC RBC AUTO-ENTMCNC: 32.6 G/DL (ref 31.4–35)
MCV RBC AUTO: 95.9 FL (ref 82–102)
MONOCYTES # BLD: 0.6 K/UL (ref 0.1–1.3)
MONOCYTES NFR BLD: 8 % (ref 4–12)
NEUTS SEG # BLD: 3.6 K/UL (ref 1.7–8.2)
NEUTS SEG NFR BLD: 54 % (ref 43–78)
NRBC # BLD: 0 K/UL (ref 0–0.2)
PLATELET # BLD AUTO: 302 K/UL (ref 150–450)
PMV BLD AUTO: 10.1 FL (ref 9.4–12.3)
POTASSIUM SERPL-SCNC: 4.7 MMOL/L (ref 3.5–5.1)
PROT SERPL-MCNC: 7 G/DL (ref 6.3–8.2)
RBC # BLD AUTO: 4.64 M/UL (ref 4.05–5.2)
SODIUM SERPL-SCNC: 140 MMOL/L (ref 133–143)
TRIGL SERPL-MCNC: 209 MG/DL (ref 35–150)
VLDLC SERPL CALC-MCNC: 41.8 MG/DL (ref 6–23)
WBC # BLD AUTO: 6.7 K/UL (ref 4.3–11.1)

## 2023-06-27 ASSESSMENT — PATIENT HEALTH QUESTIONNAIRE - PHQ9
SUM OF ALL RESPONSES TO PHQ9 QUESTIONS 1 & 2: 2
SUM OF ALL RESPONSES TO PHQ QUESTIONS 1-9: 5
10. IF YOU CHECKED OFF ANY PROBLEMS, HOW DIFFICULT HAVE THESE PROBLEMS MADE IT FOR YOU TO DO YOUR WORK, TAKE CARE OF THINGS AT HOME, OR GET ALONG WITH OTHER PEOPLE: SOMEWHAT DIFFICULT
SUM OF ALL RESPONSES TO PHQ QUESTIONS 1-9: 5
1. LITTLE INTEREST OR PLEASURE IN DOING THINGS: 1
9. THOUGHTS THAT YOU WOULD BE BETTER OFF DEAD, OR OF HURTING YOURSELF: NOT AT ALL
SUM OF ALL RESPONSES TO PHQ QUESTIONS 1-9: 5
3. TROUBLE FALLING OR STAYING ASLEEP: 1
8. MOVING OR SPEAKING SO SLOWLY THAT OTHER PEOPLE COULD HAVE NOTICED. OR THE OPPOSITE, BEING SO FIGETY OR RESTLESS THAT YOU HAVE BEEN MOVING AROUND A LOT MORE THAN USUAL: 0
1. LITTLE INTEREST OR PLEASURE IN DOING THINGS: SEVERAL DAYS
7. TROUBLE CONCENTRATING ON THINGS, SUCH AS READING THE NEWSPAPER OR WATCHING TELEVISION: 0
2. FEELING DOWN, DEPRESSED OR HOPELESS: 1
6. FEELING BAD ABOUT YOURSELF - OR THAT YOU ARE A FAILURE OR HAVE LET YOURSELF OR YOUR FAMILY DOWN: SEVERAL DAYS
8. MOVING OR SPEAKING SO SLOWLY THAT OTHER PEOPLE COULD HAVE NOTICED. OR THE OPPOSITE - BEING SO FIDGETY OR RESTLESS THAT YOU HAVE BEEN MOVING AROUND A LOT MORE THAN USUAL: NOT AT ALL
SUM OF ALL RESPONSES TO PHQ QUESTIONS 1-9: 5
10. IF YOU CHECKED OFF ANY PROBLEMS, HOW DIFFICULT HAVE THESE PROBLEMS MADE IT FOR YOU TO DO YOUR WORK, TAKE CARE OF THINGS AT HOME, OR GET ALONG WITH OTHER PEOPLE: 1
9. THOUGHTS THAT YOU WOULD BE BETTER OFF DEAD, OR OF HURTING YOURSELF: 0
6. FEELING BAD ABOUT YOURSELF - OR THAT YOU ARE A FAILURE OR HAVE LET YOURSELF OR YOUR FAMILY DOWN: 1
2. FEELING DOWN, DEPRESSED OR HOPELESS: SEVERAL DAYS
4. FEELING TIRED OR HAVING LITTLE ENERGY: 1
5. POOR APPETITE OR OVEREATING: NOT AT ALL
4. FEELING TIRED OR HAVING LITTLE ENERGY: SEVERAL DAYS
3. TROUBLE FALLING OR STAYING ASLEEP: SEVERAL DAYS
5. POOR APPETITE OR OVEREATING: 0
7. TROUBLE CONCENTRATING ON THINGS, SUCH AS READING THE NEWSPAPER OR WATCHING TELEVISION: NOT AT ALL
SUM OF ALL RESPONSES TO PHQ QUESTIONS 1-9: 5

## 2023-06-30 ENCOUNTER — OFFICE VISIT (OUTPATIENT)
Dept: INTERNAL MEDICINE CLINIC | Facility: CLINIC | Age: 78
End: 2023-06-30

## 2023-06-30 VITALS
OXYGEN SATURATION: 96 % | WEIGHT: 156.4 LBS | DIASTOLIC BLOOD PRESSURE: 84 MMHG | HEIGHT: 60 IN | SYSTOLIC BLOOD PRESSURE: 134 MMHG | HEART RATE: 77 BPM | BODY MASS INDEX: 30.7 KG/M2

## 2023-06-30 DIAGNOSIS — K21.9 GASTROESOPHAGEAL REFLUX DISEASE, UNSPECIFIED WHETHER ESOPHAGITIS PRESENT: ICD-10-CM

## 2023-06-30 DIAGNOSIS — E55.9 VITAMIN D DEFICIENCY: ICD-10-CM

## 2023-06-30 DIAGNOSIS — Z00.00 MEDICARE ANNUAL WELLNESS VISIT, SUBSEQUENT: Primary | ICD-10-CM

## 2023-06-30 DIAGNOSIS — M81.0 AGE-RELATED OSTEOPOROSIS WITHOUT CURRENT PATHOLOGICAL FRACTURE: ICD-10-CM

## 2023-06-30 DIAGNOSIS — E78.2 MIXED HYPERLIPIDEMIA: ICD-10-CM

## 2023-06-30 DIAGNOSIS — Z23 NEED FOR PROPHYLACTIC VACCINATION WITH TETANUS-DIPHTHERIA (TD): ICD-10-CM

## 2023-06-30 DIAGNOSIS — F33.0 MILD EPISODE OF RECURRENT MAJOR DEPRESSIVE DISORDER (HCC): ICD-10-CM

## 2023-06-30 PROBLEM — S52.532B OPEN COLLES' FRACTURE OF LEFT RADIUS: Status: RESOLVED | Noted: 2021-11-19 | Resolved: 2023-06-30

## 2023-06-30 RX ORDER — TETANUS AND DIPHTHERIA TOXOIDS ADSORBED 2; 2 [LF]/.5ML; [LF]/.5ML
0.5 INJECTION INTRAMUSCULAR ONCE
Qty: 0.5 ML | Refills: 0 | Status: SHIPPED | OUTPATIENT
Start: 2023-06-30 | End: 2023-06-30

## 2023-06-30 ASSESSMENT — LIFESTYLE VARIABLES
HOW OFTEN DO YOU HAVE A DRINK CONTAINING ALCOHOL: MONTHLY OR LESS
HOW MANY STANDARD DRINKS CONTAINING ALCOHOL DO YOU HAVE ON A TYPICAL DAY: PATIENT DOES NOT DRINK

## 2023-06-30 ASSESSMENT — ENCOUNTER SYMPTOMS
ABDOMINAL PAIN: 0
WHEEZING: 0
SHORTNESS OF BREATH: 0
CHEST TIGHTNESS: 0

## 2023-07-10 DIAGNOSIS — M15.9 PRIMARY OSTEOARTHRITIS INVOLVING MULTIPLE JOINTS: ICD-10-CM

## 2023-07-11 RX ORDER — MELOXICAM 15 MG/1
TABLET ORAL
Qty: 90 TABLET | Refills: 1 | OUTPATIENT
Start: 2023-07-11

## 2023-07-13 ENCOUNTER — OFFICE VISIT (OUTPATIENT)
Dept: ORTHOPEDIC SURGERY | Age: 78
End: 2023-07-13

## 2023-07-13 DIAGNOSIS — M25.532 LEFT WRIST PAIN: ICD-10-CM

## 2023-07-13 DIAGNOSIS — S52.532B COLLES' FRACTURE OF LEFT RADIUS, INITIAL ENCOUNTER FOR OPEN FRACTURE TYPE I OR II: Primary | ICD-10-CM

## 2023-07-13 RX ORDER — METHYLPREDNISOLONE ACETATE 80 MG/ML
40 INJECTION, SUSPENSION INTRA-ARTICULAR; INTRALESIONAL; INTRAMUSCULAR; SOFT TISSUE ONCE
Status: COMPLETED | OUTPATIENT
Start: 2023-07-13 | End: 2023-07-13

## 2023-07-13 RX ADMIN — METHYLPREDNISOLONE ACETATE 40 MG: 80 INJECTION, SUSPENSION INTRA-ARTICULAR; INTRALESIONAL; INTRAMUSCULAR; SOFT TISSUE at 09:12

## 2023-07-13 NOTE — PROGRESS NOTES
Progress Note    Patient: Jacki Anglin MRN: [id number]  SSN: [id number]    YOB: 1945  Age: 68 y.o. Sex: female        7/13/2023      Subjective:     Patient is now about a year and a half out from a fairly complex injury to her left wrist.  She had a open distal both bone forearm fracture that was treated with external fixation and then went back later and plated her and bone graft her. She says in general has been doing really well but she has some pain on the radial aspect of her wrist that she sort of started bothering her. The first thing she wanted to make sure was just that nothing was wrong or that something had happened with her plate fixation. She says it really has not been bothering her too much besides this 1 spot on the radial side of her wrist.  She has not had any new injury    Objective: There were no vitals filed for this visit. Physical Exam:     Skin - incision is well healed with no redness or drainage  Motor and sensory function intact in LEFT UPPER extremity  Pulses palpable in LEFT UPPER extremity   She does have a positive Finkelstein's    XRAY FINDINGS:  [id number] left distal radius and ulna fractures, findings-AP and lateral views of the left wrist shows the left distal radius and ulna fractures show significant evidence of healing these are essentially very solidly healed now. The plate and screw construct is intact with no evidence of loosening or failure and the overall alignment is satisfactory. She does have some degenerative changes in the radio carpal joint and this may be a little more impressive than it was on her films from 17 February of 2022. She also has some basilar joint arthritis as well. The ulna has completely healed and has healed a little bit of a malunited position but this is really not that remarkable.   Impression-healed left distal radius and ulna fractures with degenerative changes in the radiocarpal

## 2023-07-26 DIAGNOSIS — M15.9 PRIMARY OSTEOARTHRITIS INVOLVING MULTIPLE JOINTS: ICD-10-CM

## 2023-07-26 RX ORDER — MELOXICAM 7.5 MG/1
7.5 TABLET ORAL DAILY
Qty: 90 TABLET | Refills: 1 | Status: SHIPPED | OUTPATIENT
Start: 2023-07-26

## 2023-07-26 NOTE — TELEPHONE ENCOUNTER
Out of Medication!       Refill:    Meloxicam 15 mg        Send:     [facility]

## 2023-07-29 DIAGNOSIS — M15.9 PRIMARY OSTEOARTHRITIS INVOLVING MULTIPLE JOINTS: ICD-10-CM

## 2023-07-31 RX ORDER — MELOXICAM 15 MG/1
TABLET ORAL
Qty: 90 TABLET | Refills: 1 | OUTPATIENT
Start: 2023-07-31

## 2023-08-02 ENCOUNTER — TELEPHONE (OUTPATIENT)
Dept: INTERNAL MEDICINE CLINIC | Facility: CLINIC | Age: 78
End: 2023-08-02

## 2023-08-02 NOTE — TELEPHONE ENCOUNTER
Called and spoke with patient who stated that she received a text from the pharmacy stating that there were no refills for her Meloxicam 7.5 MG tablet. I called the pharmacy and the pharmacist stated that the patient can  the prescription at her convenience. Called and informed the patient of the same. Patient verbalized understanding.

## 2023-08-20 DIAGNOSIS — M81.0 AGE-RELATED OSTEOPOROSIS WITHOUT CURRENT PATHOLOGICAL FRACTURE: ICD-10-CM

## 2023-08-21 RX ORDER — ALENDRONATE SODIUM 70 MG/1
TABLET ORAL
Qty: 12 TABLET | Refills: 3 | OUTPATIENT
Start: 2023-08-21

## 2023-08-29 DIAGNOSIS — M15.9 PRIMARY OSTEOARTHRITIS INVOLVING MULTIPLE JOINTS: ICD-10-CM

## 2023-08-29 DIAGNOSIS — K21.9 GASTROESOPHAGEAL REFLUX DISEASE, UNSPECIFIED WHETHER ESOPHAGITIS PRESENT: ICD-10-CM

## 2023-08-29 RX ORDER — MELOXICAM 15 MG/1
TABLET ORAL
Qty: 90 TABLET | Refills: 1 | OUTPATIENT
Start: 2023-08-29

## 2023-08-29 RX ORDER — PANTOPRAZOLE SODIUM 40 MG/1
TABLET, DELAYED RELEASE ORAL
Qty: 90 TABLET | Refills: 3 | OUTPATIENT
Start: 2023-08-29

## 2023-10-11 DIAGNOSIS — M81.0 AGE-RELATED OSTEOPOROSIS WITHOUT CURRENT PATHOLOGICAL FRACTURE: ICD-10-CM

## 2023-10-11 DIAGNOSIS — E55.9 VITAMIN D DEFICIENCY: ICD-10-CM

## 2023-10-11 DIAGNOSIS — E78.2 MIXED HYPERLIPIDEMIA: ICD-10-CM

## 2023-10-11 DIAGNOSIS — K21.9 GASTROESOPHAGEAL REFLUX DISEASE, UNSPECIFIED WHETHER ESOPHAGITIS PRESENT: ICD-10-CM

## 2023-10-11 LAB
25(OH)D3 SERPL-MCNC: 47.5 NG/ML (ref 30–100)
ALBUMIN SERPL-MCNC: 4.2 G/DL (ref 3.2–4.6)
ALBUMIN/GLOB SERPL: 1.5 (ref 0.4–1.6)
ALP SERPL-CCNC: 73 U/L (ref 50–136)
ALT SERPL-CCNC: 44 U/L (ref 12–65)
ANION GAP SERPL CALC-SCNC: 7 MMOL/L (ref 2–11)
AST SERPL-CCNC: 36 U/L (ref 15–37)
BASOPHILS # BLD: 0.1 K/UL (ref 0–0.2)
BASOPHILS NFR BLD: 1 % (ref 0–2)
BILIRUB SERPL-MCNC: 0.4 MG/DL (ref 0.2–1.1)
BUN SERPL-MCNC: 16 MG/DL (ref 8–23)
CALCIUM SERPL-MCNC: 9.6 MG/DL (ref 8.3–10.4)
CHLORIDE SERPL-SCNC: 107 MMOL/L (ref 101–110)
CHOLEST SERPL-MCNC: 194 MG/DL
CO2 SERPL-SCNC: 26 MMOL/L (ref 21–32)
CREAT SERPL-MCNC: 0.8 MG/DL (ref 0.6–1)
DIFFERENTIAL METHOD BLD: NORMAL
EOSINOPHIL # BLD: 0.2 K/UL (ref 0–0.8)
EOSINOPHIL NFR BLD: 4 % (ref 0.5–7.8)
ERYTHROCYTE [DISTWIDTH] IN BLOOD BY AUTOMATED COUNT: 12.4 % (ref 11.9–14.6)
GLOBULIN SER CALC-MCNC: 2.8 G/DL (ref 2.8–4.5)
GLUCOSE SERPL-MCNC: 118 MG/DL (ref 65–100)
HCT VFR BLD AUTO: 46 % (ref 35.8–46.3)
HDLC SERPL-MCNC: 57 MG/DL (ref 40–60)
HDLC SERPL: 3.4
HGB BLD-MCNC: 14.8 G/DL (ref 11.7–15.4)
IMM GRANULOCYTES # BLD AUTO: 0 K/UL (ref 0–0.5)
IMM GRANULOCYTES NFR BLD AUTO: 0 % (ref 0–5)
LDLC SERPL CALC-MCNC: 93.8 MG/DL
LYMPHOCYTES # BLD: 2.2 K/UL (ref 0.5–4.6)
LYMPHOCYTES NFR BLD: 34 % (ref 13–44)
MCH RBC QN AUTO: 30.7 PG (ref 26.1–32.9)
MCHC RBC AUTO-ENTMCNC: 32.2 G/DL (ref 31.4–35)
MCV RBC AUTO: 95.4 FL (ref 82–102)
MONOCYTES # BLD: 0.5 K/UL (ref 0.1–1.3)
MONOCYTES NFR BLD: 8 % (ref 4–12)
NEUTS SEG # BLD: 3.4 K/UL (ref 1.7–8.2)
NEUTS SEG NFR BLD: 53 % (ref 43–78)
NRBC # BLD: 0 K/UL (ref 0–0.2)
PLATELET # BLD AUTO: 290 K/UL (ref 150–450)
PMV BLD AUTO: 10.1 FL (ref 9.4–12.3)
POTASSIUM SERPL-SCNC: 4.3 MMOL/L (ref 3.5–5.1)
PROT SERPL-MCNC: 7 G/DL (ref 6.3–8.2)
RBC # BLD AUTO: 4.82 M/UL (ref 4.05–5.2)
SODIUM SERPL-SCNC: 140 MMOL/L (ref 133–143)
TRIGL SERPL-MCNC: 216 MG/DL (ref 35–150)
VLDLC SERPL CALC-MCNC: 43.2 MG/DL (ref 6–23)
WBC # BLD AUTO: 6.4 K/UL (ref 4.3–11.1)

## 2023-10-20 ENCOUNTER — OFFICE VISIT (OUTPATIENT)
Dept: INTERNAL MEDICINE CLINIC | Facility: CLINIC | Age: 78
End: 2023-10-20
Payer: MEDICARE

## 2023-10-20 VITALS
OXYGEN SATURATION: 96 % | DIASTOLIC BLOOD PRESSURE: 82 MMHG | HEART RATE: 68 BPM | HEIGHT: 60 IN | BODY MASS INDEX: 29.53 KG/M2 | SYSTOLIC BLOOD PRESSURE: 152 MMHG | WEIGHT: 150.4 LBS

## 2023-10-20 DIAGNOSIS — M15.9 PRIMARY OSTEOARTHRITIS INVOLVING MULTIPLE JOINTS: ICD-10-CM

## 2023-10-20 DIAGNOSIS — K21.9 GASTROESOPHAGEAL REFLUX DISEASE, UNSPECIFIED WHETHER ESOPHAGITIS PRESENT: ICD-10-CM

## 2023-10-20 DIAGNOSIS — Z23 NEED FOR IMMUNIZATION AGAINST INFLUENZA: ICD-10-CM

## 2023-10-20 DIAGNOSIS — E78.2 MIXED HYPERLIPIDEMIA: Primary | ICD-10-CM

## 2023-10-20 DIAGNOSIS — E55.9 VITAMIN D DEFICIENCY: Chronic | ICD-10-CM

## 2023-10-20 DIAGNOSIS — J30.89 SEASONAL ALLERGIC RHINITIS DUE TO OTHER ALLERGIC TRIGGER: ICD-10-CM

## 2023-10-20 DIAGNOSIS — I10 PRIMARY HYPERTENSION: ICD-10-CM

## 2023-10-20 DIAGNOSIS — M81.0 AGE-RELATED OSTEOPOROSIS WITHOUT CURRENT PATHOLOGICAL FRACTURE: ICD-10-CM

## 2023-10-20 DIAGNOSIS — Z79.1 NSAID LONG-TERM USE: ICD-10-CM

## 2023-10-20 PROCEDURE — 1123F ACP DISCUSS/DSCN MKR DOCD: CPT | Performed by: NURSE PRACTITIONER

## 2023-10-20 PROCEDURE — 3078F DIAST BP <80 MM HG: CPT | Performed by: NURSE PRACTITIONER

## 2023-10-20 PROCEDURE — 99215 OFFICE O/P EST HI 40 MIN: CPT | Performed by: NURSE PRACTITIONER

## 2023-10-20 PROCEDURE — 3074F SYST BP LT 130 MM HG: CPT | Performed by: NURSE PRACTITIONER

## 2023-10-20 RX ORDER — PRAVASTATIN SODIUM 40 MG
40 TABLET ORAL EVERY EVENING
Qty: 90 TABLET | Refills: 3 | Status: SHIPPED | OUTPATIENT
Start: 2023-10-20

## 2023-10-20 RX ORDER — PANTOPRAZOLE SODIUM 40 MG/1
40 TABLET, DELAYED RELEASE ORAL DAILY
Qty: 90 TABLET | Refills: 3 | Status: SHIPPED | OUTPATIENT
Start: 2023-10-20

## 2023-10-20 RX ORDER — DEXAMETHASONE 0.5 MG/5ML
ELIXIR ORAL
COMMUNITY
Start: 2023-10-01

## 2023-10-20 RX ORDER — FLUTICASONE PROPIONATE 50 MCG
2 SPRAY, SUSPENSION (ML) NASAL
Qty: 16 G | Refills: 3 | Status: SHIPPED | OUTPATIENT
Start: 2023-10-20

## 2023-10-20 RX ORDER — MELOXICAM 7.5 MG/1
7.5 TABLET ORAL DAILY
Qty: 90 TABLET | Refills: 1 | Status: SHIPPED | OUTPATIENT
Start: 2023-10-20

## 2023-10-20 ASSESSMENT — ENCOUNTER SYMPTOMS
SHORTNESS OF BREATH: 1
CHEST TIGHTNESS: 0

## 2023-10-20 NOTE — PROGRESS NOTES
hearing noted. Tympanic membrane is perforated. Mouth/Throat:      Lips: Pink. Mouth: Mucous membranes are moist.   Eyes:      General: Lids are normal.   Neck:      Vascular: No carotid bruit. Cardiovascular:      Rate and Rhythm: Normal rate and regular rhythm. Pulmonary:      Effort: Pulmonary effort is normal.      Breath sounds: Normal breath sounds. Musculoskeletal:      Cervical back: Neck supple. Right lower leg: No edema. Left lower leg: No edema. Skin:     General: Skin is warm and dry. Neurological:      Mental Status: She is alert and oriented to person, place, and time. Mental status is at baseline. Gait: Gait normal.   Psychiatric:         Mood and Affect: Mood normal.         Behavior: Behavior normal.          ASSESSMENT and PLAN    1. Mixed hyperlipidemia  -     pravastatin (PRAVACHOL) 40 MG tablet; Take 1 tablet by mouth every evening, Disp-90 tablet, R-3Normal  -     Comprehensive Metabolic Panel; Future  -     Lipid Panel; Future  2. Gastroesophageal reflux disease, unspecified whether esophagitis present  -     pantoprazole (PROTONIX) 40 MG tablet; Take 1 tablet by mouth daily, Disp-90 tablet, R-3Normal  3. Primary osteoarthritis involving multiple joints  -     meloxicam (MOBIC) 7.5 MG tablet; Take 1 tablet by mouth daily Take with food. , Disp-90 tablet, R-1Normal  -     CBC with Auto Differential; Future  -     Comprehensive Metabolic Panel; Future  4. Seasonal allergic rhinitis due to other allergic trigger  -     fluticasone (FLONASE) 50 MCG/ACT nasal spray; 2 sprays by Nasal route nightly, Disp-16 g, R-3Normal  5. Vitamin D deficiency  6. Age-related osteoporosis without current pathological fracture  -     CBC with Auto Differential; Future  -     Comprehensive Metabolic Panel; Future  7. Need for immunization against influenza  -     Influenza, FLUAD, (age 72 y+), IM, Preservative Free, 0.5 mL  8.  Primary hypertension  -     CBC with Auto Differential;

## 2023-11-28 ENCOUNTER — OFFICE VISIT (OUTPATIENT)
Dept: INTERNAL MEDICINE CLINIC | Facility: CLINIC | Age: 78
End: 2023-11-28
Payer: MEDICARE

## 2023-11-28 VITALS
HEART RATE: 71 BPM | BODY MASS INDEX: 29.37 KG/M2 | WEIGHT: 149.6 LBS | OXYGEN SATURATION: 96 % | HEIGHT: 60 IN | SYSTOLIC BLOOD PRESSURE: 134 MMHG | DIASTOLIC BLOOD PRESSURE: 82 MMHG

## 2023-11-28 DIAGNOSIS — M15.9 PRIMARY OSTEOARTHRITIS INVOLVING MULTIPLE JOINTS: ICD-10-CM

## 2023-11-28 DIAGNOSIS — J30.89 SEASONAL ALLERGIC RHINITIS DUE TO OTHER ALLERGIC TRIGGER: ICD-10-CM

## 2023-11-28 DIAGNOSIS — I10 PRIMARY HYPERTENSION: Primary | ICD-10-CM

## 2023-11-28 PROCEDURE — 1123F ACP DISCUSS/DSCN MKR DOCD: CPT | Performed by: NURSE PRACTITIONER

## 2023-11-28 PROCEDURE — 3074F SYST BP LT 130 MM HG: CPT | Performed by: NURSE PRACTITIONER

## 2023-11-28 PROCEDURE — 3078F DIAST BP <80 MM HG: CPT | Performed by: NURSE PRACTITIONER

## 2023-11-28 PROCEDURE — 99213 OFFICE O/P EST LOW 20 MIN: CPT | Performed by: NURSE PRACTITIONER

## 2023-11-28 RX ORDER — MELOXICAM 7.5 MG/1
7.5 TABLET ORAL EVERY OTHER DAY
Qty: 90 TABLET | Refills: 1
Start: 2023-11-28

## 2023-11-28 RX ORDER — FLUTICASONE PROPIONATE 50 MCG
2 SPRAY, SUSPENSION (ML) NASAL
Qty: 3 EACH | Refills: 3 | Status: SHIPPED | OUTPATIENT
Start: 2023-11-28

## 2023-11-28 NOTE — PROGRESS NOTES
PROGRESS NOTE    SUBJECTIVE:   Chhaya Mattson is a 66 y.o. female seen for a follow up visit for   Chief Complaint   Patient presents with    Follow-up    Hypertension     Elevated blood pressure  She has been recording blood pressures that remain in the 130s over 80s average. CHRONIC MEDICAL PROBLEMS    Cholesterol Problem  Onset: . Pertinent negatives include no chest pain. Treatments tried: Pravastatin 20 mg ineffective; improved with atorvastatin 40 mg qhs. Started having muscle aches in lower extremities and could not climb stairs. Took self off atorvastatin and restarted an  prescription of pravastatin 40 mg. Depression  Onset: . 's health issues and the passing of her sister aggravates the symptoms. The symptoms are relieved by medications. Treatments tried: celexa. Referred to psych and has been seeing Dr. Pedro Costa. She believes this is beneficial.  Dr. Pedro Costa has taken over the alprazolam and celexa prescriptions. Loss of hearing  Lost right hearing aid. Provider:  [facility], [address] [place] [address].  [phone].     Past Medical History:   Diagnosis Date    Allergic rhinitis     Chronic pain     left hip pain- a lot better    Depression     Deviated septum     Disorder of bone and cartilage, unspecified 3/9/2015    Generalized anxiety disorder     GERD (gastroesophageal reflux disease)     pantoprazole b.i.d., well controlled-     History of hand fracture     Intestinal disaccharidase deficiencies and disaccharide malabsorption 1356    Lichen planus     oral    Mixed hyperlipidemia 3/9/2015    Nonspecific elevation of levels of transaminase or lactic acid dehydrogenase (LDH) 3/9/2015    Open Colles' fracture of left radius 2021    Osteoarthritis of multiple joints 3/15/2016    Osteoporosis 2015    Pain in joint, lower leg 3/9/2015    Plantar fascial fibromatosis     Prediabetes 2016    no current meds, pt

## 2023-11-28 NOTE — PATIENT INSTRUCTIONS
Decrease Meloxicam 7.5 mg to every other day. You may use tylenol as directed on package. Have labs drawn 2 to 5 days prior to your appointment.

## 2023-12-06 ASSESSMENT — ENCOUNTER SYMPTOMS
CHEST TIGHTNESS: 0
SHORTNESS OF BREATH: 0

## 2024-01-18 ENCOUNTER — OFFICE VISIT (OUTPATIENT)
Dept: INTERNAL MEDICINE CLINIC | Facility: CLINIC | Age: 79
End: 2024-01-18
Payer: MEDICARE

## 2024-01-18 VITALS
OXYGEN SATURATION: 93 % | DIASTOLIC BLOOD PRESSURE: 80 MMHG | HEART RATE: 72 BPM | SYSTOLIC BLOOD PRESSURE: 132 MMHG | BODY MASS INDEX: 29.41 KG/M2 | WEIGHT: 150.6 LBS

## 2024-01-18 DIAGNOSIS — R05.1 ACUTE COUGH: Primary | ICD-10-CM

## 2024-01-18 PROCEDURE — 1123F ACP DISCUSS/DSCN MKR DOCD: CPT | Performed by: INTERNAL MEDICINE

## 2024-01-18 PROCEDURE — 99213 OFFICE O/P EST LOW 20 MIN: CPT | Performed by: INTERNAL MEDICINE

## 2024-01-18 RX ORDER — LEVOFLOXACIN 500 MG/1
500 TABLET, FILM COATED ORAL DAILY
Qty: 7 TABLET | Refills: 0 | Status: SHIPPED | OUTPATIENT
Start: 2024-01-18 | End: 2024-01-25

## 2024-01-18 ASSESSMENT — ENCOUNTER SYMPTOMS
COUGH: 0
SHORTNESS OF BREATH: 0
ABDOMINAL PAIN: 0

## 2024-01-18 NOTE — PROGRESS NOTES
SUBJECTIVE:   Rosanna Roberts is a 78 y.o. female seen for a visit regarding   Chief Complaint   Patient presents with    Other     Deep cough, green mucus x 1 week  Covid Neg.          HPI  Deep cough with green phlegm since 1 week; no fever, COVID negative,     Past Medical History, Past Surgical History, Family history, Social History, and Medications were all reviewed with the patient today and updated as necessary.       Current Outpatient Medications   Medication Sig Dispense Refill    levoFLOXacin (LEVAQUIN) 500 MG tablet Take 1 tablet by mouth daily for 7 days 7 tablet 0    fluticasone (FLONASE) 50 MCG/ACT nasal spray 2 sprays by Nasal route nightly 3 each 3    meloxicam (MOBIC) 7.5 MG tablet Take 1 tablet by mouth every other day Take with food. 90 tablet 1    dexamethasone 0.5 MG/5ML elixir RINSE 5 MLS BY MOUTH FOUR TO FIVE TIMES A DAY FOR 1 MINUTE AND SPIT OUT      pantoprazole (PROTONIX) 40 MG tablet Take 1 tablet by mouth daily 90 tablet 3    pravastatin (PRAVACHOL) 40 MG tablet Take 1 tablet by mouth every evening 90 tablet 3    estradiol (ESTRACE VAGINAL) 0.1 MG/GM vaginal cream Use one gram vaginally 2x weekly at bedtime 42.5 g 5    ALPRAZolam (XANAX) 0.5 MG tablet Take 1 tablet by mouth nightly as needed.      Cholecalciferol (VITAMIN D3) 50 MCG (2000 UT) CAPS Take by mouth      citalopram (CELEXA) 20 MG tablet Take 1 tablet by mouth daily 90 tablet 3    Multiple Vitamin (MULTIVITAMIN PO) Take 1 tablet by mouth daily      acetaminophen (TYLENOL) 500 MG tablet Take 1 tablet by mouth every 6 hours as needed      clotrimazole (LOTRIMIN) 1 % cream Apply topically 2 times daily as needed       No current facility-administered medications for this visit.     Allergies   Allergen Reactions    Amoxicillin Diarrhea     Per pt gets bruising spots on skin     Meperidine Hcl     Meperidine Nausea And Vomiting     Patient Active Problem List   Diagnosis    Achilles tendon injury, left, initial encounter

## 2024-02-26 DIAGNOSIS — M15.9 PRIMARY OSTEOARTHRITIS INVOLVING MULTIPLE JOINTS: ICD-10-CM

## 2024-02-26 DIAGNOSIS — M81.0 AGE-RELATED OSTEOPOROSIS WITHOUT CURRENT PATHOLOGICAL FRACTURE: ICD-10-CM

## 2024-02-26 DIAGNOSIS — Z79.1 NSAID LONG-TERM USE: ICD-10-CM

## 2024-02-26 DIAGNOSIS — E78.2 MIXED HYPERLIPIDEMIA: ICD-10-CM

## 2024-02-26 DIAGNOSIS — I10 PRIMARY HYPERTENSION: ICD-10-CM

## 2024-02-26 LAB
ALBUMIN SERPL-MCNC: 4.3 G/DL (ref 3.2–4.6)
ALBUMIN/GLOB SERPL: 1.5 (ref 0.4–1.6)
ALP SERPL-CCNC: 72 U/L (ref 50–136)
ALT SERPL-CCNC: 35 U/L (ref 12–65)
ANION GAP SERPL CALC-SCNC: 4 MMOL/L (ref 2–11)
AST SERPL-CCNC: 24 U/L (ref 15–37)
BASOPHILS # BLD: 0.1 K/UL (ref 0–0.2)
BASOPHILS NFR BLD: 1 % (ref 0–2)
BILIRUB SERPL-MCNC: 0.5 MG/DL (ref 0.2–1.1)
BUN SERPL-MCNC: 18 MG/DL (ref 8–23)
CALCIUM SERPL-MCNC: 9.7 MG/DL (ref 8.3–10.4)
CHLORIDE SERPL-SCNC: 107 MMOL/L (ref 103–113)
CHOLEST SERPL-MCNC: 193 MG/DL
CO2 SERPL-SCNC: 30 MMOL/L (ref 21–32)
CREAT SERPL-MCNC: 0.8 MG/DL (ref 0.6–1)
DIFFERENTIAL METHOD BLD: NORMAL
EOSINOPHIL # BLD: 0.1 K/UL (ref 0–0.8)
EOSINOPHIL NFR BLD: 1 % (ref 0.5–7.8)
ERYTHROCYTE [DISTWIDTH] IN BLOOD BY AUTOMATED COUNT: 12.4 % (ref 11.9–14.6)
GLOBULIN SER CALC-MCNC: 2.8 G/DL (ref 2.8–4.5)
GLUCOSE SERPL-MCNC: 97 MG/DL (ref 65–100)
HCT VFR BLD AUTO: 43.8 % (ref 35.8–46.3)
HDLC SERPL-MCNC: 59 MG/DL (ref 40–60)
HDLC SERPL: 3.3
HGB BLD-MCNC: 14.4 G/DL (ref 11.7–15.4)
IMM GRANULOCYTES # BLD AUTO: 0 K/UL (ref 0–0.5)
IMM GRANULOCYTES NFR BLD AUTO: 0 % (ref 0–5)
LDLC SERPL CALC-MCNC: 108 MG/DL
LYMPHOCYTES # BLD: 2.3 K/UL (ref 0.5–4.6)
LYMPHOCYTES NFR BLD: 39 % (ref 13–44)
MCH RBC QN AUTO: 30.9 PG (ref 26.1–32.9)
MCHC RBC AUTO-ENTMCNC: 32.9 G/DL (ref 31.4–35)
MCV RBC AUTO: 94 FL (ref 82–102)
MONOCYTES # BLD: 0.4 K/UL (ref 0.1–1.3)
MONOCYTES NFR BLD: 6 % (ref 4–12)
NEUTS SEG # BLD: 3 K/UL (ref 1.7–8.2)
NEUTS SEG NFR BLD: 53 % (ref 43–78)
NRBC # BLD: 0 K/UL (ref 0–0.2)
PLATELET # BLD AUTO: 258 K/UL (ref 150–450)
PMV BLD AUTO: 10.2 FL (ref 9.4–12.3)
POTASSIUM SERPL-SCNC: 4.3 MMOL/L (ref 3.5–5.1)
PROT SERPL-MCNC: 7.1 G/DL (ref 6.3–8.2)
RBC # BLD AUTO: 4.66 M/UL (ref 4.05–5.2)
SODIUM SERPL-SCNC: 141 MMOL/L (ref 136–146)
TRIGL SERPL-MCNC: 130 MG/DL (ref 35–150)
TSH, 3RD GENERATION: 1.8 UIU/ML (ref 0.36–3.74)
VLDLC SERPL CALC-MCNC: 26 MG/DL (ref 6–23)
WBC # BLD AUTO: 5.9 K/UL (ref 4.3–11.1)

## 2024-04-06 SDOH — ECONOMIC STABILITY: TRANSPORTATION INSECURITY
IN THE PAST 12 MONTHS, HAS LACK OF TRANSPORTATION KEPT YOU FROM MEETINGS, WORK, OR FROM GETTING THINGS NEEDED FOR DAILY LIVING?: NO

## 2024-04-06 SDOH — ECONOMIC STABILITY: INCOME INSECURITY: HOW HARD IS IT FOR YOU TO PAY FOR THE VERY BASICS LIKE FOOD, HOUSING, MEDICAL CARE, AND HEATING?: NOT HARD AT ALL

## 2024-04-06 SDOH — ECONOMIC STABILITY: FOOD INSECURITY: WITHIN THE PAST 12 MONTHS, YOU WORRIED THAT YOUR FOOD WOULD RUN OUT BEFORE YOU GOT MONEY TO BUY MORE.: NEVER TRUE

## 2024-04-06 SDOH — ECONOMIC STABILITY: FOOD INSECURITY: WITHIN THE PAST 12 MONTHS, THE FOOD YOU BOUGHT JUST DIDN'T LAST AND YOU DIDN'T HAVE MONEY TO GET MORE.: NEVER TRUE

## 2024-04-06 ASSESSMENT — PATIENT HEALTH QUESTIONNAIRE - PHQ9
4. FEELING TIRED OR HAVING LITTLE ENERGY: SEVERAL DAYS
10. IF YOU CHECKED OFF ANY PROBLEMS, HOW DIFFICULT HAVE THESE PROBLEMS MADE IT FOR YOU TO DO YOUR WORK, TAKE CARE OF THINGS AT HOME, OR GET ALONG WITH OTHER PEOPLE: NOT DIFFICULT AT ALL
8. MOVING OR SPEAKING SO SLOWLY THAT OTHER PEOPLE COULD HAVE NOTICED. OR THE OPPOSITE, BEING SO FIGETY OR RESTLESS THAT YOU HAVE BEEN MOVING AROUND A LOT MORE THAN USUAL: NOT AT ALL
7. TROUBLE CONCENTRATING ON THINGS, SUCH AS READING THE NEWSPAPER OR WATCHING TELEVISION: NOT AT ALL
3. TROUBLE FALLING OR STAYING ASLEEP: SEVERAL DAYS
SUM OF ALL RESPONSES TO PHQ QUESTIONS 1-9: 4
6. FEELING BAD ABOUT YOURSELF - OR THAT YOU ARE A FAILURE OR HAVE LET YOURSELF OR YOUR FAMILY DOWN: NOT AT ALL
1. LITTLE INTEREST OR PLEASURE IN DOING THINGS: SEVERAL DAYS
6. FEELING BAD ABOUT YOURSELF - OR THAT YOU ARE A FAILURE OR HAVE LET YOURSELF OR YOUR FAMILY DOWN: NOT AT ALL
8. MOVING OR SPEAKING SO SLOWLY THAT OTHER PEOPLE COULD HAVE NOTICED. OR THE OPPOSITE - BEING SO FIDGETY OR RESTLESS THAT YOU HAVE BEEN MOVING AROUND A LOT MORE THAN USUAL: NOT AT ALL
7. TROUBLE CONCENTRATING ON THINGS, SUCH AS READING THE NEWSPAPER OR WATCHING TELEVISION: NOT AT ALL
SUM OF ALL RESPONSES TO PHQ QUESTIONS 1-9: 4
9. THOUGHTS THAT YOU WOULD BE BETTER OFF DEAD, OR OF HURTING YOURSELF: NOT AT ALL
3. TROUBLE FALLING OR STAYING ASLEEP: SEVERAL DAYS
5. POOR APPETITE OR OVEREATING: NOT AT ALL
10. IF YOU CHECKED OFF ANY PROBLEMS, HOW DIFFICULT HAVE THESE PROBLEMS MADE IT FOR YOU TO DO YOUR WORK, TAKE CARE OF THINGS AT HOME, OR GET ALONG WITH OTHER PEOPLE: NOT DIFFICULT AT ALL
4. FEELING TIRED OR HAVING LITTLE ENERGY: SEVERAL DAYS
2. FEELING DOWN, DEPRESSED OR HOPELESS: SEVERAL DAYS
SUM OF ALL RESPONSES TO PHQ9 QUESTIONS 1 & 2: 2
SUM OF ALL RESPONSES TO PHQ QUESTIONS 1-9: 4
SUM OF ALL RESPONSES TO PHQ QUESTIONS 1-9: 4
2. FEELING DOWN, DEPRESSED OR HOPELESS: SEVERAL DAYS
5. POOR APPETITE OR OVEREATING: NOT AT ALL
SUM OF ALL RESPONSES TO PHQ QUESTIONS 1-9: 4
9. THOUGHTS THAT YOU WOULD BE BETTER OFF DEAD, OR OF HURTING YOURSELF: NOT AT ALL
1. LITTLE INTEREST OR PLEASURE IN DOING THINGS: SEVERAL DAYS

## 2024-04-09 ENCOUNTER — OFFICE VISIT (OUTPATIENT)
Dept: INTERNAL MEDICINE CLINIC | Facility: CLINIC | Age: 79
End: 2024-04-09
Payer: MEDICARE

## 2024-04-09 VITALS
DIASTOLIC BLOOD PRESSURE: 70 MMHG | HEIGHT: 60 IN | OXYGEN SATURATION: 94 % | BODY MASS INDEX: 29.25 KG/M2 | HEART RATE: 78 BPM | WEIGHT: 149 LBS | SYSTOLIC BLOOD PRESSURE: 130 MMHG

## 2024-04-09 DIAGNOSIS — F33.0 MILD EPISODE OF RECURRENT MAJOR DEPRESSIVE DISORDER (HCC): ICD-10-CM

## 2024-04-09 DIAGNOSIS — M15.9 PRIMARY OSTEOARTHRITIS INVOLVING MULTIPLE JOINTS: ICD-10-CM

## 2024-04-09 DIAGNOSIS — Z12.31 ENCOUNTER FOR SCREENING MAMMOGRAM FOR MALIGNANT NEOPLASM OF BREAST: ICD-10-CM

## 2024-04-09 DIAGNOSIS — R73.09 ELEVATED HEMOGLOBIN A1C: ICD-10-CM

## 2024-04-09 DIAGNOSIS — E78.2 MIXED HYPERLIPIDEMIA: Primary | Chronic | ICD-10-CM

## 2024-04-09 DIAGNOSIS — M81.0 AGE-RELATED OSTEOPOROSIS WITHOUT CURRENT PATHOLOGICAL FRACTURE: ICD-10-CM

## 2024-04-09 DIAGNOSIS — M25.561 RIGHT MEDIAL KNEE PAIN: ICD-10-CM

## 2024-04-09 DIAGNOSIS — E55.9 VITAMIN D DEFICIENCY: ICD-10-CM

## 2024-04-09 PROCEDURE — 99214 OFFICE O/P EST MOD 30 MIN: CPT | Performed by: NURSE PRACTITIONER

## 2024-04-09 PROCEDURE — 1123F ACP DISCUSS/DSCN MKR DOCD: CPT | Performed by: NURSE PRACTITIONER

## 2024-04-09 RX ORDER — MELOXICAM 7.5 MG/1
7.5 TABLET ORAL DAILY
Qty: 90 TABLET | Refills: 1 | Status: SHIPPED | OUTPATIENT
Start: 2024-04-09

## 2024-04-09 NOTE — PROGRESS NOTES
Fam Hx         Review of Systems   HENT:  Positive for hearing loss.    Respiratory:  Negative for chest tightness and shortness of breath.    Cardiovascular:  Negative for chest pain, palpitations and leg swelling.   Musculoskeletal:  Positive for arthralgias (right knee pain -- medial).        OBJECTIVE:    /70 (Site: Left Upper Arm, Position: Sitting, Cuff Size: Medium Adult)   Pulse 78   Ht 1.524 m (5')   Wt 67.6 kg (149 lb)   SpO2 94%   BMI 29.10 kg/m²      Physical Exam  Vitals and nursing note reviewed.   Constitutional:       Appearance: Normal appearance.   HENT:      Head: Normocephalic.   Cardiovascular:      Rate and Rhythm: Normal rate and regular rhythm.   Pulmonary:      Effort: Pulmonary effort is normal.      Breath sounds: Normal breath sounds.   Musculoskeletal:      Right knee: Tenderness (right medial tendon) present over the MCL.      Left knee: Tenderness present over the MCL.   Skin:     General: Skin is warm and dry.   Neurological:      General: No focal deficit present.      Mental Status: She is alert and oriented to person, place, and time.      Motor: No weakness.      Gait: Gait normal.   Psychiatric:         Mood and Affect: Mood normal.         Behavior: Behavior normal.          ASSESSMENT and PLAN    1. Mixed hyperlipidemia  -     Comprehensive Metabolic Panel; Future  -     Lipid Panel; Future  2. Elevated hemoglobin A1c  3. Right medial knee pain  4. Primary osteoarthritis involving multiple joints  -     meloxicam (MOBIC) 7.5 MG tablet; Take 1 tablet by mouth daily Take with food., Disp-90 tablet, R-1Normal  5. Mild episode of recurrent major depressive disorder (HCC)  6. Age-related osteoporosis without current pathological fracture  -     DEXA BONE DENSITY AXIAL SKELETON; Future  7. Encounter for screening mammogram for malignant neoplasm of breast  -     [facility] EUSEBIA DIGITAL SCREEN BILATERAL; Future  8. Vitamin D deficiency  -     Vitamin D 25 Hydroxy;

## 2024-05-25 ENCOUNTER — HOSPITAL ENCOUNTER (OUTPATIENT)
Dept: MAMMOGRAPHY | Age: 79
End: 2024-05-25
Payer: MEDICARE

## 2024-05-25 DIAGNOSIS — Z12.31 ENCOUNTER FOR SCREENING MAMMOGRAM FOR MALIGNANT NEOPLASM OF BREAST: ICD-10-CM

## 2024-05-25 PROCEDURE — 77063 BREAST TOMOSYNTHESIS BI: CPT

## 2024-06-27 ENCOUNTER — TELEMEDICINE (OUTPATIENT)
Dept: INTERNAL MEDICINE CLINIC | Facility: CLINIC | Age: 79
End: 2024-06-27
Payer: MEDICARE

## 2024-06-27 DIAGNOSIS — Z00.00 MEDICARE ANNUAL WELLNESS VISIT, SUBSEQUENT: Primary | ICD-10-CM

## 2024-06-27 PROCEDURE — G0439 PPPS, SUBSEQ VISIT: HCPCS | Performed by: NURSE PRACTITIONER

## 2024-06-27 PROCEDURE — 1123F ACP DISCUSS/DSCN MKR DOCD: CPT | Performed by: NURSE PRACTITIONER

## 2024-06-27 ASSESSMENT — PATIENT HEALTH QUESTIONNAIRE - PHQ9
7. TROUBLE CONCENTRATING ON THINGS, SUCH AS READING THE NEWSPAPER OR WATCHING TELEVISION: NOT AT ALL
SUM OF ALL RESPONSES TO PHQ QUESTIONS 1-9: 4
5. POOR APPETITE OR OVEREATING: NEARLY EVERY DAY
1. LITTLE INTEREST OR PLEASURE IN DOING THINGS: NOT AT ALL
8. MOVING OR SPEAKING SO SLOWLY THAT OTHER PEOPLE COULD HAVE NOTICED. OR THE OPPOSITE, BEING SO FIGETY OR RESTLESS THAT YOU HAVE BEEN MOVING AROUND A LOT MORE THAN USUAL: NOT AT ALL
SUM OF ALL RESPONSES TO PHQ QUESTIONS 1-9: 4
9. THOUGHTS THAT YOU WOULD BE BETTER OFF DEAD, OR OF HURTING YOURSELF: NOT AT ALL
SUM OF ALL RESPONSES TO PHQ QUESTIONS 1-9: 4
2. FEELING DOWN, DEPRESSED OR HOPELESS: NOT AT ALL
SUM OF ALL RESPONSES TO PHQ QUESTIONS 1-9: 4
3. TROUBLE FALLING OR STAYING ASLEEP: NOT AT ALL
SUM OF ALL RESPONSES TO PHQ9 QUESTIONS 1 & 2: 0
10. IF YOU CHECKED OFF ANY PROBLEMS, HOW DIFFICULT HAVE THESE PROBLEMS MADE IT FOR YOU TO DO YOUR WORK, TAKE CARE OF THINGS AT HOME, OR GET ALONG WITH OTHER PEOPLE: NOT DIFFICULT AT ALL
6. FEELING BAD ABOUT YOURSELF - OR THAT YOU ARE A FAILURE OR HAVE LET YOURSELF OR YOUR FAMILY DOWN: NOT AT ALL
4. FEELING TIRED OR HAVING LITTLE ENERGY: SEVERAL DAYS

## 2024-06-27 ASSESSMENT — LIFESTYLE VARIABLES
HOW OFTEN DO YOU HAVE A DRINK CONTAINING ALCOHOL: NEVER
HOW MANY STANDARD DRINKS CONTAINING ALCOHOL DO YOU HAVE ON A TYPICAL DAY: PATIENT DOES NOT DRINK

## 2024-06-27 NOTE — PROGRESS NOTES
Medicare Annual Wellness Visit    Rosanna Roberts is here for Medicare AWV    Assessment & Plan   Medicare annual wellness visit, subsequent  Recommendations for Preventive Services Due: see orders and patient instructions/AVS.  Recommended screening schedule for the next 5-10 years is provided to the patient in written form: see Patient Instructions/AVS.     Return for Follow-Up, regularly scheduled appointment or sooner prn.     Subjective     Patient's complete Health Risk Assessment and screening values have been reviewed and are found in Flowsheets. The following problems were reviewed today and where indicated follow up appointments were made and/or referrals ordered.    Positive Risk Factor Screenings with Interventions:                Activity, Diet, and Weight:  On average, how many days per week do you engage in moderate to strenuous exercise (like a brisk walk)?: 0 days  On average, how many minutes do you engage in exercise at this level?: 0 min    Do you eat balanced/healthy meals regularly?: (!) No    There is no height or weight on file to calculate BMI.      Inactivity Interventions:  See AVS for additional education material  Do you eat balanced/healthy meals regularly Interventions:  See AVS for additional education material           Safety:  Do you have working smoke detectors?: (!) No  Interventions:  Patient advised to install working smoke detectors in the home.               Objective      Patient-Reported Vitals  No data recorded         Allergies   Allergen Reactions    Amoxicillin Diarrhea     Per pt gets bruising spots on skin     Meperidine Hcl     Meperidine Nausea And Vomiting     Prior to Visit Medications    Medication Sig Taking? Authorizing Provider   meloxicam (MOBIC) 7.5 MG tablet Take 1 tablet by mouth daily Take with food. Yes Fanta Duran, APRN - NP   fluticasone (FLONASE) 50 MCG/ACT nasal spray 2 sprays by Nasal route nightly Yes Fanta Duran, APRN - NP

## 2024-06-27 NOTE — PATIENT INSTRUCTIONS
Learning About Being Active as an Older Adult  Why is being active important as you get older?     Being active is one of the best things you can do for your health. And it's never too late to start. Being active--or getting active, if you aren't already--has definite benefits. It can:  Give you more energy,  Keep your mind sharp.  Improve balance to reduce your risk of falls.  Help you manage chronic illness with fewer medicines.  No matter how old you are, how fit you are, or what health problems you have, there is a form of activity that will work for you. And the more physical activity you can do, the better your overall health will be.  What kinds of activity can help you stay healthy?  Being more active will make your daily activities easier. Physical activity includes planned exercise and things you do in daily life. There are four types of activity:  Aerobic.  Doing aerobic activity makes your heart and lungs strong.  Includes walking, dancing, and gardening.  Aim for at least 2½ hours spread throughout the week.  It improves your energy and can help you sleep better.  Muscle-strengthening.  This type of activity can help maintain muscle and strengthen bones.  Includes climbing stairs, using resistance bands, and lifting or carrying heavy loads.  Aim for at least twice a week.  It can help protect the knees and other joints.  Stretching.  Stretching gives you better range of motion in joints and muscles.  Includes upper arm stretches, calf stretches, and gentle yoga.  Aim for at least twice a week, preferably after your muscles are warmed up from other activities.  It can help you function better in daily life.  Balancing.  This helps you stay coordinated and have good posture.  Includes heel-to-toe walking, silvio chi, and certain types of yoga.  Aim for at least 3 days a week.  It can reduce your risk of falling.  Even if you have a hard time meeting the recommendations, it's better to be more active

## 2024-10-07 DIAGNOSIS — E78.2 MIXED HYPERLIPIDEMIA: Chronic | ICD-10-CM

## 2024-10-07 DIAGNOSIS — E55.9 VITAMIN D DEFICIENCY: ICD-10-CM

## 2024-10-07 LAB
25(OH)D3 SERPL-MCNC: 53.3 NG/ML (ref 30–100)
ALBUMIN SERPL-MCNC: 4.5 G/DL (ref 3.2–4.6)
ALBUMIN/GLOB SERPL: 2 (ref 1–1.9)
ALP SERPL-CCNC: 74 U/L (ref 35–104)
ALT SERPL-CCNC: 27 U/L (ref 8–45)
ANION GAP SERPL CALC-SCNC: 14 MMOL/L (ref 9–18)
AST SERPL-CCNC: 29 U/L (ref 15–37)
BILIRUB SERPL-MCNC: 0.4 MG/DL (ref 0–1.2)
BUN SERPL-MCNC: 11 MG/DL (ref 8–23)
CALCIUM SERPL-MCNC: 9.8 MG/DL (ref 8.8–10.2)
CHLORIDE SERPL-SCNC: 101 MMOL/L (ref 98–107)
CHOLEST SERPL-MCNC: 180 MG/DL (ref 0–200)
CO2 SERPL-SCNC: 25 MMOL/L (ref 20–28)
CREAT SERPL-MCNC: 0.73 MG/DL (ref 0.6–1.1)
GLOBULIN SER CALC-MCNC: 2.3 G/DL (ref 2.3–3.5)
GLUCOSE SERPL-MCNC: 91 MG/DL (ref 70–99)
HDLC SERPL-MCNC: 50 MG/DL (ref 40–60)
HDLC SERPL: 3.6 (ref 0–5)
LDLC SERPL CALC-MCNC: 79 MG/DL (ref 0–100)
POTASSIUM SERPL-SCNC: 4.4 MMOL/L (ref 3.5–5.1)
PROT SERPL-MCNC: 6.7 G/DL (ref 6.3–8.2)
SODIUM SERPL-SCNC: 140 MMOL/L (ref 136–145)
TRIGL SERPL-MCNC: 255 MG/DL (ref 0–150)
VLDLC SERPL CALC-MCNC: 51 MG/DL (ref 6–23)

## 2024-10-15 ENCOUNTER — OFFICE VISIT (OUTPATIENT)
Dept: INTERNAL MEDICINE CLINIC | Facility: CLINIC | Age: 79
End: 2024-10-15
Payer: MEDICARE

## 2024-10-15 VITALS
SYSTOLIC BLOOD PRESSURE: 132 MMHG | OXYGEN SATURATION: 92 % | WEIGHT: 152.6 LBS | DIASTOLIC BLOOD PRESSURE: 72 MMHG | BODY MASS INDEX: 29.8 KG/M2 | HEART RATE: 87 BPM

## 2024-10-15 DIAGNOSIS — K21.9 GASTROESOPHAGEAL REFLUX DISEASE, UNSPECIFIED WHETHER ESOPHAGITIS PRESENT: ICD-10-CM

## 2024-10-15 DIAGNOSIS — F32.1 CURRENT MODERATE EPISODE OF MAJOR DEPRESSIVE DISORDER WITHOUT PRIOR EPISODE (HCC): ICD-10-CM

## 2024-10-15 DIAGNOSIS — E78.2 MIXED HYPERLIPIDEMIA: ICD-10-CM

## 2024-10-15 DIAGNOSIS — M15.0 PRIMARY OSTEOARTHRITIS INVOLVING MULTIPLE JOINTS: ICD-10-CM

## 2024-10-15 DIAGNOSIS — R73.01 ELEVATED FASTING GLUCOSE: Primary | ICD-10-CM

## 2024-10-15 DIAGNOSIS — J30.89 SEASONAL ALLERGIC RHINITIS DUE TO OTHER ALLERGIC TRIGGER: ICD-10-CM

## 2024-10-15 DIAGNOSIS — Z23 NEED FOR IMMUNIZATION AGAINST INFLUENZA: ICD-10-CM

## 2024-10-15 PROBLEM — M62.81 MUSCLE WEAKNESS (GENERALIZED): Status: ACTIVE | Noted: 2021-11-25

## 2024-10-15 PROBLEM — R26.81 UNSTEADINESS ON FEET: Status: ACTIVE | Noted: 2021-11-25

## 2024-10-15 PROBLEM — M25.572 PAIN IN LEFT ANKLE AND JOINTS OF LEFT FOOT: Status: ACTIVE | Noted: 2021-11-25

## 2024-10-15 PROBLEM — R27.8 OTHER LACK OF COORDINATION: Status: ACTIVE | Noted: 2021-11-25

## 2024-10-15 PROBLEM — R26.2 DIFFICULTY IN WALKING, NOT ELSEWHERE CLASSIFIED: Status: ACTIVE | Noted: 2021-11-25

## 2024-10-15 PROBLEM — F41.1 GENERALIZED ANXIETY DISORDER: Status: ACTIVE | Noted: 2021-11-25

## 2024-10-15 PROBLEM — F33.9 MAJOR DEPRESSIVE DISORDER, RECURRENT, UNSPECIFIED (HCC): Status: ACTIVE | Noted: 2021-11-25

## 2024-10-15 PROCEDURE — 90653 IIV ADJUVANT VACCINE IM: CPT | Performed by: NURSE PRACTITIONER

## 2024-10-15 PROCEDURE — 99214 OFFICE O/P EST MOD 30 MIN: CPT | Performed by: NURSE PRACTITIONER

## 2024-10-15 PROCEDURE — G0008 ADMIN INFLUENZA VIRUS VAC: HCPCS | Performed by: NURSE PRACTITIONER

## 2024-10-15 PROCEDURE — 1123F ACP DISCUSS/DSCN MKR DOCD: CPT | Performed by: NURSE PRACTITIONER

## 2024-10-15 RX ORDER — PRAVASTATIN SODIUM 40 MG
40 TABLET ORAL EVERY EVENING
Qty: 90 TABLET | Refills: 3 | Status: SHIPPED | OUTPATIENT
Start: 2024-10-15

## 2024-10-15 RX ORDER — MELOXICAM 7.5 MG/1
7.5 TABLET ORAL DAILY
Qty: 90 TABLET | Refills: 3 | Status: SHIPPED | OUTPATIENT
Start: 2024-10-15

## 2024-10-15 RX ORDER — FLUTICASONE PROPIONATE 50 MCG
2 SPRAY, SUSPENSION (ML) NASAL
Qty: 3 EACH | Refills: 1 | Status: SHIPPED | OUTPATIENT
Start: 2024-10-15

## 2024-10-15 RX ORDER — PANTOPRAZOLE SODIUM 40 MG/1
40 TABLET, DELAYED RELEASE ORAL DAILY
Qty: 90 TABLET | Refills: 3 | Status: SHIPPED | OUTPATIENT
Start: 2024-10-15

## 2024-10-15 NOTE — PROGRESS NOTES
(sensorineural hearing loss)     Sudden hearing loss of both ears     HA to tawny ears    Tinnitus of both ears         Past Surgical History:   Procedure Laterality Date    COLONOSCOPY      EYE SURGERY  2023    cataracts    HYSTERECTOMY, VAGINAL  1980s    Ovaries intact per pt    ORTHOPEDIC SURGERY Left     external fixator    ORTHOPEDIC SURGERY Left 01/2022    LEFT WRIST OPEN REDUCTION INTERNAL FIXATION WITH REMOVAL OF EX FIX CHOICE ANES     ORTHOPEDIC SURGERY Right     wrist     PARTIAL HYSTERECTOMY (CERVIX NOT REMOVED)  1980    TONSILLECTOMY  1965       Current Outpatient Medications   Medication Sig Dispense Refill    pravastatin (PRAVACHOL) 40 MG tablet Take 1 tablet by mouth every evening 90 tablet 3    meloxicam (MOBIC) 7.5 MG tablet Take 1 tablet by mouth daily Take with food. 90 tablet 3    pantoprazole (PROTONIX) 40 MG tablet Take 1 tablet by mouth daily 90 tablet 3    fluticasone (FLONASE) 50 MCG/ACT nasal spray 2 sprays by Nasal route nightly 3 each 1    dexamethasone 0.5 MG/5ML elixir RINSE 5 MLS BY MOUTH FOUR TO FIVE TIMES A DAY FOR 1 MINUTE AND SPIT OUT      ALPRAZolam (XANAX) 0.5 MG tablet Take 1 tablet by mouth nightly as needed.      Cholecalciferol (VITAMIN D3) 50 MCG (2000 UT) CAPS Take by mouth      citalopram (CELEXA) 20 MG tablet Take 1 tablet by mouth daily 90 tablet 3    Multiple Vitamin (MULTIVITAMIN PO) Take 1 tablet by mouth daily      acetaminophen (TYLENOL) 500 MG tablet Take 1 tablet by mouth every 6 hours as needed       No current facility-administered medications for this visit.         Reviewed and updated this visit by provider:  Tobacco  Allergies  Meds  Problems  Med Hx  Surg Hx  Fam Hx         Review of Systems   HENT:  Positive for hearing loss.    Respiratory:  Negative for chest tightness and shortness of breath.    Cardiovascular:  Negative for chest pain, palpitations and leg swelling.   Musculoskeletal:  Positive for arthralgias (right knee pain -- medial).

## 2025-01-28 SDOH — HEALTH STABILITY: PHYSICAL HEALTH: ON AVERAGE, HOW MANY DAYS PER WEEK DO YOU ENGAGE IN MODERATE TO STRENUOUS EXERCISE (LIKE A BRISK WALK)?: 0 DAYS

## 2025-01-30 ENCOUNTER — OFFICE VISIT (OUTPATIENT)
Dept: PRIMARY CARE CLINIC | Facility: CLINIC | Age: 80
End: 2025-01-30

## 2025-01-30 VITALS
WEIGHT: 155 LBS | HEIGHT: 60 IN | DIASTOLIC BLOOD PRESSURE: 78 MMHG | HEART RATE: 80 BPM | TEMPERATURE: 98.1 F | SYSTOLIC BLOOD PRESSURE: 118 MMHG | OXYGEN SATURATION: 97 % | BODY MASS INDEX: 30.43 KG/M2

## 2025-01-30 DIAGNOSIS — Z13.228 SCREENING FOR ENDOCRINE, NUTRITIONAL, METABOLIC AND IMMUNITY DISORDER: ICD-10-CM

## 2025-01-30 DIAGNOSIS — Z13.228 SCREENING FOR METABOLIC DISORDER: ICD-10-CM

## 2025-01-30 DIAGNOSIS — K21.9 GASTRO-ESOPHAGEAL REFLUX DISEASE WITHOUT ESOPHAGITIS: ICD-10-CM

## 2025-01-30 DIAGNOSIS — F41.1 GENERALIZED ANXIETY DISORDER: ICD-10-CM

## 2025-01-30 DIAGNOSIS — Z13.0 SCREENING FOR ENDOCRINE, NUTRITIONAL, METABOLIC AND IMMUNITY DISORDER: ICD-10-CM

## 2025-01-30 DIAGNOSIS — Z13.21 SCREENING FOR ENDOCRINE, NUTRITIONAL, METABOLIC AND IMMUNITY DISORDER: ICD-10-CM

## 2025-01-30 DIAGNOSIS — R21 RASH: ICD-10-CM

## 2025-01-30 DIAGNOSIS — E55.9 VITAMIN D DEFICIENCY: ICD-10-CM

## 2025-01-30 DIAGNOSIS — R73.09 OTHER ABNORMAL GLUCOSE: ICD-10-CM

## 2025-01-30 DIAGNOSIS — Z13.0 SCREENING FOR IRON DEFICIENCY ANEMIA: ICD-10-CM

## 2025-01-30 DIAGNOSIS — Z79.899 ENCOUNTER FOR LONG-TERM (CURRENT) USE OF MEDICATIONS: ICD-10-CM

## 2025-01-30 DIAGNOSIS — Z13.29 SCREENING FOR THYROID DISORDER: ICD-10-CM

## 2025-01-30 DIAGNOSIS — Z13.21 ENCOUNTER FOR VITAMIN DEFICIENCY SCREENING: ICD-10-CM

## 2025-01-30 DIAGNOSIS — R79.9 ABNORMAL BLOOD CHEMISTRY: ICD-10-CM

## 2025-01-30 DIAGNOSIS — Z13.220 SCREENING FOR LIPID DISORDERS: ICD-10-CM

## 2025-01-30 DIAGNOSIS — R53.83 OTHER FATIGUE: ICD-10-CM

## 2025-01-30 DIAGNOSIS — R79.89 OTHER SPECIFIED ABNORMAL FINDINGS OF BLOOD CHEMISTRY: ICD-10-CM

## 2025-01-30 DIAGNOSIS — Z13.29 SCREENING FOR ENDOCRINE, NUTRITIONAL, METABOLIC AND IMMUNITY DISORDER: ICD-10-CM

## 2025-01-30 DIAGNOSIS — R53.81 MALAISE: ICD-10-CM

## 2025-01-30 DIAGNOSIS — R53.83 MALAISE AND FATIGUE: ICD-10-CM

## 2025-01-30 DIAGNOSIS — J30.1 NON-SEASONAL ALLERGIC RHINITIS DUE TO POLLEN: ICD-10-CM

## 2025-01-30 DIAGNOSIS — M15.0 PRIMARY OSTEOARTHRITIS INVOLVING MULTIPLE JOINTS: ICD-10-CM

## 2025-01-30 DIAGNOSIS — E78.2 MIXED HYPERLIPIDEMIA: Chronic | ICD-10-CM

## 2025-01-30 DIAGNOSIS — F33.0 MILD EPISODE OF RECURRENT MAJOR DEPRESSIVE DISORDER (HCC): ICD-10-CM

## 2025-01-30 DIAGNOSIS — H72.93 CHRONIC TUBOTYMPANIC DISEASE OF BOTH EARS WITH ANTERIOR PERFORATION OF TYMPANIC MEMBRANE: ICD-10-CM

## 2025-01-30 DIAGNOSIS — R53.81 MALAISE AND FATIGUE: ICD-10-CM

## 2025-01-30 DIAGNOSIS — H66.13 CHRONIC TUBOTYMPANIC DISEASE OF BOTH EARS WITH ANTERIOR PERFORATION OF TYMPANIC MEMBRANE: ICD-10-CM

## 2025-01-30 DIAGNOSIS — Z13.0 SCREENING FOR DEFICIENCY ANEMIA: ICD-10-CM

## 2025-01-30 DIAGNOSIS — Z13.1 SCREENING FOR DIABETES MELLITUS: Primary | ICD-10-CM

## 2025-01-30 DIAGNOSIS — F33.9 RECURRENT MAJOR DEPRESSIVE DISORDER, REMISSION STATUS UNSPECIFIED (HCC): ICD-10-CM

## 2025-01-30 DIAGNOSIS — R73.03 PREDIABETES: ICD-10-CM

## 2025-01-30 DIAGNOSIS — Z00.00 MEDICARE ANNUAL WELLNESS VISIT, SUBSEQUENT: ICD-10-CM

## 2025-01-30 DIAGNOSIS — R79.0 LOW MAGNESIUM LEVEL: ICD-10-CM

## 2025-01-30 DIAGNOSIS — M81.0 AGE-RELATED OSTEOPOROSIS WITHOUT CURRENT PATHOLOGICAL FRACTURE: ICD-10-CM

## 2025-01-30 DIAGNOSIS — H90.3 SENSORINEURAL HEARING LOSS (SNHL) OF BOTH EARS: ICD-10-CM

## 2025-01-30 DIAGNOSIS — Z13.1 SCREENING FOR DIABETES MELLITUS: ICD-10-CM

## 2025-01-30 DIAGNOSIS — R53.82 CHRONIC FATIGUE: ICD-10-CM

## 2025-01-30 PROBLEM — R06.02 SHORTNESS OF BREATH: Status: RESOLVED | Noted: 2021-08-31 | Resolved: 2025-01-30

## 2025-01-30 PROBLEM — R26.81 UNSTEADINESS ON FEET: Status: RESOLVED | Noted: 2021-11-25 | Resolved: 2025-01-30

## 2025-01-30 PROBLEM — M62.81 MUSCLE WEAKNESS (GENERALIZED): Status: RESOLVED | Noted: 2021-11-25 | Resolved: 2025-01-30

## 2025-01-30 PROBLEM — R27.8 OTHER LACK OF COORDINATION: Status: RESOLVED | Noted: 2021-11-25 | Resolved: 2025-01-30

## 2025-01-30 PROBLEM — S86.002A ACHILLES TENDON INJURY, LEFT, INITIAL ENCOUNTER: Status: RESOLVED | Noted: 2021-11-22 | Resolved: 2025-01-30

## 2025-01-30 PROBLEM — R26.2 DIFFICULTY IN WALKING, NOT ELSEWHERE CLASSIFIED: Status: RESOLVED | Noted: 2021-11-25 | Resolved: 2025-01-30

## 2025-01-30 PROBLEM — M25.572 PAIN IN LEFT ANKLE AND JOINTS OF LEFT FOOT: Status: RESOLVED | Noted: 2021-11-25 | Resolved: 2025-01-30

## 2025-01-30 LAB
25(OH)D3 SERPL-MCNC: 41.2 NG/ML (ref 30–100)
ALBUMIN SERPL-MCNC: 4.2 G/DL (ref 3.2–4.6)
ALBUMIN/GLOB SERPL: 1.6 (ref 1–1.9)
ALP SERPL-CCNC: 80 U/L (ref 35–104)
ALT SERPL-CCNC: 31 U/L (ref 8–45)
ANION GAP SERPL CALC-SCNC: 13 MMOL/L (ref 7–16)
AST SERPL-CCNC: 30 U/L (ref 15–37)
BASOPHILS # BLD: 0.09 K/UL (ref 0–0.2)
BASOPHILS NFR BLD: 1.5 % (ref 0–2)
BILIRUB SERPL-MCNC: 0.3 MG/DL (ref 0–1.2)
BUN SERPL-MCNC: 19 MG/DL (ref 8–23)
CALCIUM SERPL-MCNC: 10.3 MG/DL (ref 8.8–10.2)
CHLORIDE SERPL-SCNC: 102 MMOL/L (ref 98–107)
CHOLEST SERPL-MCNC: 209 MG/DL (ref 0–200)
CO2 SERPL-SCNC: 27 MMOL/L (ref 20–29)
CREAT SERPL-MCNC: 0.8 MG/DL (ref 0.6–1.1)
DIFFERENTIAL METHOD BLD: NORMAL
EOSINOPHIL # BLD: 0.09 K/UL (ref 0–0.8)
EOSINOPHIL NFR BLD: 1.5 % (ref 0.5–7.8)
ERYTHROCYTE [DISTWIDTH] IN BLOOD BY AUTOMATED COUNT: 12.5 % (ref 11.9–14.6)
EST. AVERAGE GLUCOSE BLD GHB EST-MCNC: 119 MG/DL
FERRITIN SERPL-MCNC: 181 NG/ML (ref 8–388)
FOLATE SERPL-MCNC: >40 NG/ML (ref 3.1–17.5)
GLOBULIN SER CALC-MCNC: 2.6 G/DL (ref 2.3–3.5)
GLUCOSE SERPL-MCNC: 104 MG/DL (ref 70–99)
HBA1C MFR BLD: 5.8 % (ref 0–5.6)
HCT VFR BLD AUTO: 43.9 % (ref 35.8–46.3)
HDLC SERPL-MCNC: 61 MG/DL (ref 40–60)
HDLC SERPL: 3.4 (ref 0–5)
HGB BLD-MCNC: 14.5 G/DL (ref 11.7–15.4)
IMM GRANULOCYTES # BLD AUTO: 0.01 K/UL (ref 0–0.5)
IMM GRANULOCYTES NFR BLD AUTO: 0.2 % (ref 0–5)
IRON SATN MFR SERPL: 33 % (ref 20–50)
IRON SERPL-MCNC: 97 UG/DL (ref 35–100)
LDLC SERPL CALC-MCNC: 110 MG/DL (ref 0–100)
LYMPHOCYTES # BLD: 2.16 K/UL (ref 0.5–4.6)
LYMPHOCYTES NFR BLD: 36.7 % (ref 13–44)
MAGNESIUM SERPL-MCNC: 2.1 MG/DL (ref 1.8–2.4)
MCH RBC QN AUTO: 31.1 PG (ref 26.1–32.9)
MCHC RBC AUTO-ENTMCNC: 33 G/DL (ref 31.4–35)
MCV RBC AUTO: 94.2 FL (ref 82–102)
MONOCYTES # BLD: 0.43 K/UL (ref 0.1–1.3)
MONOCYTES NFR BLD: 7.3 % (ref 4–12)
NEUTS SEG # BLD: 3.1 K/UL (ref 1.7–8.2)
NEUTS SEG NFR BLD: 52.8 % (ref 43–78)
NRBC # BLD: 0 K/UL (ref 0–0.2)
PLATELET # BLD AUTO: 270 K/UL (ref 150–450)
PMV BLD AUTO: 10.1 FL (ref 9.4–12.3)
POTASSIUM SERPL-SCNC: 4.4 MMOL/L (ref 3.5–5.1)
PROT SERPL-MCNC: 6.8 G/DL (ref 6.3–8.2)
RBC # BLD AUTO: 4.66 M/UL (ref 4.05–5.2)
SODIUM SERPL-SCNC: 141 MMOL/L (ref 136–145)
TIBC SERPL-MCNC: 299 UG/DL (ref 240–450)
TRIGL SERPL-MCNC: 193 MG/DL (ref 0–150)
TSH W FREE THYROID IF ABNORMAL: 1.8 UIU/ML (ref 0.27–4.2)
UIBC SERPL-MCNC: 202 UG/DL (ref 112–347)
VIT B12 SERPL-MCNC: 521 PG/ML (ref 193–986)
VLDLC SERPL CALC-MCNC: 39 MG/DL (ref 6–23)
WBC # BLD AUTO: 5.9 K/UL (ref 4.3–11.1)

## 2025-01-30 RX ORDER — CALCIUM CARBONATE 500(1250)
500 TABLET ORAL DAILY
COMMUNITY

## 2025-01-30 RX ORDER — NYSTATIN 100000 [USP'U]/G
POWDER TOPICAL 4 TIMES DAILY
Qty: 60 G | Refills: 1 | Status: SHIPPED | OUTPATIENT
Start: 2025-01-30

## 2025-01-30 RX ORDER — NYSTATIN 100000 [USP'U]/G
POWDER TOPICAL 4 TIMES DAILY
COMMUNITY
End: 2025-01-30 | Stop reason: SDUPTHER

## 2025-01-30 RX ORDER — SENNOSIDES A AND B 8.6 MG/1
1 TABLET, FILM COATED ORAL DAILY
COMMUNITY

## 2025-01-30 SDOH — ECONOMIC STABILITY: FOOD INSECURITY: WITHIN THE PAST 12 MONTHS, THE FOOD YOU BOUGHT JUST DIDN'T LAST AND YOU DIDN'T HAVE MONEY TO GET MORE.: NEVER TRUE

## 2025-01-30 SDOH — ECONOMIC STABILITY: FOOD INSECURITY: WITHIN THE PAST 12 MONTHS, YOU WORRIED THAT YOUR FOOD WOULD RUN OUT BEFORE YOU GOT MONEY TO BUY MORE.: NEVER TRUE

## 2025-01-30 ASSESSMENT — ENCOUNTER SYMPTOMS
SHORTNESS OF BREATH: 0
DIARRHEA: 0
ABDOMINAL PAIN: 0
NAUSEA: 0
ALLERGIC/IMMUNOLOGIC NEGATIVE: 1
VOMITING: 0
CONSTIPATION: 0
EYES NEGATIVE: 1
CHEST TIGHTNESS: 0
RESPIRATORY NEGATIVE: 1

## 2025-01-30 ASSESSMENT — PATIENT HEALTH QUESTIONNAIRE - PHQ9
SUM OF ALL RESPONSES TO PHQ QUESTIONS 1-9: 0
10. IF YOU CHECKED OFF ANY PROBLEMS, HOW DIFFICULT HAVE THESE PROBLEMS MADE IT FOR YOU TO DO YOUR WORK, TAKE CARE OF THINGS AT HOME, OR GET ALONG WITH OTHER PEOPLE: NOT DIFFICULT AT ALL
4. FEELING TIRED OR HAVING LITTLE ENERGY: NOT AT ALL
SUM OF ALL RESPONSES TO PHQ9 QUESTIONS 1 & 2: 0
6. FEELING BAD ABOUT YOURSELF - OR THAT YOU ARE A FAILURE OR HAVE LET YOURSELF OR YOUR FAMILY DOWN: NOT AT ALL
1. LITTLE INTEREST OR PLEASURE IN DOING THINGS: NOT AT ALL
8. MOVING OR SPEAKING SO SLOWLY THAT OTHER PEOPLE COULD HAVE NOTICED. OR THE OPPOSITE, BEING SO FIGETY OR RESTLESS THAT YOU HAVE BEEN MOVING AROUND A LOT MORE THAN USUAL: NOT AT ALL
9. THOUGHTS THAT YOU WOULD BE BETTER OFF DEAD, OR OF HURTING YOURSELF: NOT AT ALL
5. POOR APPETITE OR OVEREATING: NOT AT ALL
7. TROUBLE CONCENTRATING ON THINGS, SUCH AS READING THE NEWSPAPER OR WATCHING TELEVISION: NOT AT ALL
SUM OF ALL RESPONSES TO PHQ QUESTIONS 1-9: 0
SUM OF ALL RESPONSES TO PHQ QUESTIONS 1-9: 0
2. FEELING DOWN, DEPRESSED OR HOPELESS: NOT AT ALL
3. TROUBLE FALLING OR STAYING ASLEEP: NOT AT ALL
SUM OF ALL RESPONSES TO PHQ QUESTIONS 1-9: 0

## 2025-01-30 NOTE — PATIENT INSTRUCTIONS
goals. Many people expect to lose much more weight than is likely. A weight loss of 5% to 10% of your body weight may be enough to improve your health.  Get family and friends involved to provide support. Talk to them about why you are trying to lose weight, and ask them to help. They can help by participating in exercise and having meals with you, even if they may be eating something different.  Find what works best for you. If you do not have time or do not like to cook, a program that offers meal replacement bars or shakes may be better for you. Or if you like to prepare meals, finding a plan that includes daily menus and recipes may be best.  Ask your doctor about other health professionals who can help you achieve your weight-loss goals.  A dietitian can help you make healthy changes in your diet.  An exercise specialist or  can help you develop a safe and effective exercise program.  A counselor or psychiatrist can help you cope with issues such as depression, anxiety, or family problems that can make it hard to focus on weight loss.  Consider joining a support group for people who are trying to lose weight. Your doctor can suggest groups in your area.  Where can you learn more?  Go to https://[email or web address].net/patientEd and enter U357 to learn more about \"Starting a Weight-Loss Plan: Care Instructions.\"  Current as of: April 30, 2024  Content Version: 14.3  © 2024 [employer].   Care instructions adapted under license by [employer]. If you have questions about a medical condition or this instruction, always ask your healthcare professional. [employer], disclaims any warranty or liability for your use of this information.         A Healthy Heart: Care Instructions  Overview     Coronary artery disease, also called heart disease, occurs when a substance called plaque builds up in the vessels that supply oxygen-rich blood to your heart muscle. This can narrow the blood

## 2025-01-30 NOTE — ASSESSMENT & PLAN NOTE
On alprazolam 0.5 mg p.o. q. nightly  On Celexa 20 mg p.o. daily  Does see specialist  Stable.  Chronic.  Continue current treatment plan

## 2025-01-30 NOTE — ASSESSMENT & PLAN NOTE
Not on meds  Does not see specialist  On pravastatin 40 mg p.o. daily  Stable.  Chronic.  Current treatment plan

## 2025-01-30 NOTE — PROGRESS NOTES
Patient verbalized understanding. Patient declined all other medications (OTC, provided by clinic and prescribed) as well as additional testing/imaging/diagnostics at this time. Patient aware of risks associated with declining treatment/recommendations and/or non-compliance with plan of care.    *Side effects, adverse effects, risks versus benefits associated with medications prescribed/recommended were discussed with the patient. Patient verbalized understanding. All questions answered.    *Patient was encouraged to return to the clinic and/or PCP. Or seek emergent care if worsening signs and symptoms warrant immediate evaluation including, but not limited to HA, blurred vision, facial asymmetry, speech disturbance, difficulty with ambulation/gait, numbness, tingling, weakness, syncope, chest pain (with or without radiation), left arm pain, jaw pain, changes in hearing (loss), fever, unexplained sweating, malaise/fatigue, difficulty swallowing, mental changes (confusion, AMS), lightheadedness/dizziness, difficulty breathing, or shortness of breath.    I have reviewed the patient's medication list, past medical, family, social, and surgical history in detail and updated the patient record appropriately.    I have reviewed the patient's vital signs and discussed risks associated with any abnormal vital signs (during visit and following this visit) as well as appropriate parameters with the patient. Patient verbalized understanding. Patient agreed to seek emergent care if vital signs are above or below the parameters discussed.     Explanatory note: Be assured that the information provided to create your medical record comes from your provider. The written transcription portion of this note is prepared electronically by voice-recognition software. At times, there may be some errors in capitalization, punctuation, tense, or context that are inherent in the system, but your provider reviews the note for content and to

## 2025-01-30 NOTE — ASSESSMENT & PLAN NOTE
On meloxicam 7.5 mg p.o. daily  Stable.  Chronic.  Does not see orthopedics  Continue current treatment plan

## 2025-01-30 NOTE — ASSESSMENT & PLAN NOTE
On pravastatin 40 mg p.o. daily  Stable.  Chronic.  Current treatment plan  Does not see specialist

## 2025-01-31 NOTE — RESULT ENCOUNTER NOTE
Please let the patient know:    Lab Results Summary    Most of your lab results are within the normal range. However, a few areas require attention:  1. Blood Sugar (Prediabetes)  º Glucose: 104 mg/dL (High, normal is 70-99 mg/dL)  º Hemoglobin A1C: 5.8% (High, normal is 0-5.6%)  º eAG (estimated average glucose): 119 mg/dL  º Assessment: These results indicate prediabetes is still present but stable.    2. Cholesterol and Lipids  º Total Cholesterol: 209 mg/dL (High, normal is <200 mg/dL)  º LDL (\"bad\" cholesterol): 110 mg/dL (High, normal is <100 mg/dL)  º Triglycerides: 193 mg/dL (High, normal is <150 mg/dL)  º VLDL: 39 mg/dL (High, normal is 6-23 mg/dL)  º Assessment: These numbers indicate some worsening of cholesterol levels despite pravastatin.    3. Calcium Levels  º Calcium: 10.3 mg/dL (High, normal is 8.8-10.2 mg/dL)  º Assessment: Slightly elevated calcium could be from your calcium supplements.    4. Vitamin and Iron Levels  º Folate: >40.0 ng/mL (High, normal is 3.1-17.5 ng/mL) = Recommend reducing folic acid supplement  º Vitamin D: 41.2 ng/mL (Normal, optimal for bone health)  º Vitamin B12: 521 pg/mL (Normal)  º Iron, Ferritin, and TIBC: All normal    Recommendations (Assessment & Plan)  1. Prediabetes:  º Continue monitoring blood sugar levels with lifestyle management (healthy diet, exercise).  º Reduce processed carbohydrates and increase fiber intake.  º Consider repeating A1C in 6 months to monitor trends.    2. Cholesterol & Lipids:  º Continue pravastatin (40 mg).  º Increase intake of omega-3-rich foods (fish, walnuts) or discuss omega-3 supplement.  º Consider reducing saturated fats and increasing fiber intake (vegetables, whole grains).  º Recheck lipid panel in 3-6 months.    3. Calcium Levels:  º Slightly high calcium may be due to supplements.  º Consider adjusting calcium intake if no dietary deficiency.  º No need for urgent action but monitor.    4. General Health:  º No signs of

## 2025-02-04 ENCOUNTER — TELEPHONE (OUTPATIENT)
Dept: ORTHOPEDIC SURGERY | Age: 80
End: 2025-02-04

## 2025-02-04 NOTE — TELEPHONE ENCOUNTER
Pt was scheduled for a recheck today with Dr. Fowler. Pt's new 2025 ins is [employer], which our office does not accept. Spoke with pt to make her aware. Apt canceled. Told pt that when she finds a new Ortho office that takes her ins we would be happy to send over any records. Pt verbalized understanding.

## 2025-02-18 ENCOUNTER — TELEMEDICINE (OUTPATIENT)
Dept: PRIMARY CARE CLINIC | Facility: CLINIC | Age: 80
End: 2025-02-18

## 2025-02-18 DIAGNOSIS — M79.602 LEFT ARM PAIN: ICD-10-CM

## 2025-02-18 DIAGNOSIS — M15.9 OSTEOARTHRITIS OF MULTIPLE JOINTS, UNSPECIFIED OSTEOARTHRITIS TYPE: Primary | ICD-10-CM

## 2025-02-18 ASSESSMENT — ENCOUNTER SYMPTOMS
NAUSEA: 0
SHORTNESS OF BREATH: 0
EYES NEGATIVE: 1
CONSTIPATION: 0
ALLERGIC/IMMUNOLOGIC NEGATIVE: 1
VOMITING: 0
RESPIRATORY NEGATIVE: 1
ABDOMINAL PAIN: 0
CHEST TIGHTNESS: 0
DIARRHEA: 0

## 2025-02-18 NOTE — PROGRESS NOTES
2025    TELEHEALTH EVALUATION -- Audio/Visual    History of Present Illness  The patient presents via virtual visit for evaluation of her left arm.    Left Arm Sensation  - She reports a sensation of displacement in her left arm.  - She requests an x-ray to check the surgical site.    Surgical History  - She had surgery on the same arm in 2022.    Referral Request  - Due to a change in insurance, she seeks a referral to another orthopedic specialist.    Rosanna Roberts (:  1945) has requested an audio/video evaluation for the following concern(s):    Chief Complaint   Patient presents with    Referral - General     Referral to orthopedic surgeon. [facility]- [phone]. Left arm pain       Review of Systems   Constitutional: Negative.  Negative for activity change, appetite change, fatigue and fever.   HENT: Negative.     Eyes: Negative.    Respiratory: Negative.  Negative for chest tightness and shortness of breath.    Cardiovascular:  Negative for chest pain and palpitations.   Gastrointestinal:  Negative for abdominal pain, constipation, diarrhea, nausea and vomiting.   Endocrine: Negative.    Genitourinary: Negative.    Musculoskeletal:  Positive for arthralgias.   Allergic/Immunologic: Negative.    Neurological:  Negative for dizziness, numbness and headaches.   Hematological: Negative.    Psychiatric/Behavioral: Negative.     All other systems reviewed and are negative.      Prior to Visit Medications    Medication Sig Taking? Authorizing Provider   calcium carbonate (OSCAL) 500 MG TABS tablet Take 1 tablet by mouth daily Yes Provider, MD Nitza   senna (SENOKOT) 8.6 MG tablet Take 1 tablet by mouth daily Yes Provider, MD Nitza   nystatin (MYCOSTATIN) 167089 UNIT/GM powder Apply topically 4 times daily Apply topically 4 times daily. Yes Carole Gloria, RUDDY - NP   pravastatin (PRAVACHOL) 40 MG tablet Take 1 tablet by mouth every evening Yes Roger

## 2025-03-17 ENCOUNTER — TELEMEDICINE (OUTPATIENT)
Dept: PRIMARY CARE CLINIC | Facility: CLINIC | Age: 80
End: 2025-03-17
Payer: MEDICARE

## 2025-03-17 DIAGNOSIS — U07.1 SARS-COV-2 POSITIVE: Primary | ICD-10-CM

## 2025-03-17 DIAGNOSIS — Z20.822 EXPOSURE TO COVID-19 VIRUS: ICD-10-CM

## 2025-03-17 PROCEDURE — 1160F RVW MEDS BY RX/DR IN RCRD: CPT | Performed by: NURSE PRACTITIONER

## 2025-03-17 PROCEDURE — 1159F MED LIST DOCD IN RCRD: CPT | Performed by: NURSE PRACTITIONER

## 2025-03-17 PROCEDURE — 99213 OFFICE O/P EST LOW 20 MIN: CPT | Performed by: NURSE PRACTITIONER

## 2025-03-17 PROCEDURE — 1123F ACP DISCUSS/DSCN MKR DOCD: CPT | Performed by: NURSE PRACTITIONER

## 2025-03-17 ASSESSMENT — ENCOUNTER SYMPTOMS
SINUS PAIN: 1
SINUS PRESSURE: 1
COUGH: 1
SORE THROAT: 1
RHINORRHEA: 1

## 2025-03-17 NOTE — PROGRESS NOTES
<20.0 ng/mL  Insufficiency       20.0-29.9 ng/mL      TSH w Free Thyroid if Abnormal 01/30/2025 1.80  0.27 - 4.20 UIU/ML Final    Hemoglobin A1C 01/30/2025 5.8 (H)  0 - 5.6 % Final    Comment: Reference Range  Normal       <5.7%  Prediabetes  5.7-6.4%  Diabetes     >6.4%      Estimated Avg Glucose 01/30/2025 119  mg/dL Final       Educational documents were provided including; but, not limited to diagnosis, prognosis, recurrent, complications, monitoring, instructions, prevention, etc.    Patient aware of all medications (prescribed and recommended). Patient verbalized understanding. Patient declined all other medications (OTC, provided by clinic and prescribed) as well as additional testing/imaging/diagnostics at this time. Patient aware of risks associated with declining treatment/recommendations and/or non-compliance with plan of care.    *Side effects, adverse effects, risks versus benefits associated with medications prescribed/recommended were discussed with the patient. Patient verbalized understanding. All questions answered.    *Patient was encouraged to return to the clinic and/or PCP. Or seek emergent care if worsening signs and symptoms warrant immediate evaluation including, but not limited to HA, blurred vision, facial asymmetry, speech disturbance, difficulty with ambulation/gait, numbness, tingling, weakness, syncope, chest pain (with or without radiation), left arm pain, jaw pain, changes in hearing (loss), fever, unexplained sweating, malaise/fatigue, difficulty swallowing, mental changes (confusion, AMS), lightheadedness/dizziness, difficulty breathing, or shortness of breath.    I have reviewed the patient's medication list, past medical, family, social, and surgical history in detail and updated the patient record appropriately.    I have reviewed the patient's vital signs and discussed risks associated with any abnormal vital signs (during visit and following this visit) as well as

## (undated) DEVICE — DISPOSABLE BIPOLAR CODE, 12' (3.66 M): Brand: CONMED

## (undated) DEVICE — DRAPE,HAND,STERILE: Brand: MEDLINE

## (undated) DEVICE — Z DUP USE 2635506 SODIUM CHL 0.9% IRRIG

## (undated) DEVICE — UPPER EXTREMITY: Brand: MEDLINE INDUSTRIES, INC.

## (undated) DEVICE — SMALL BOWL SET-LF: Brand: MEDLINE INDUSTRIES, INC.

## (undated) DEVICE — DERMABOND SKIN ADH 0.7ML -- DERMABOND ADVANCED 12/BX

## (undated) DEVICE — SUTURE STRATAFIX SPRL SZ 3-0 L9IN ABSRB VLT FS L26MM 3/8 SXPD2B419

## (undated) DEVICE — STERILE HOOK LOCK LATEX FREE ELASTIC BANDAGE 3INX5YD: Brand: HOOK LOCK™

## (undated) DEVICE — REM POLYHESIVE ADULT PATIENT RETURN ELECTRODE: Brand: VALLEYLAB

## (undated) DEVICE — SCREW EXT FIX L80MM DIA4X2.5MM THRD L20MM S STL SELF DRL MR

## (undated) DEVICE — 7 DAY SILVER-COATED ANTIMICROBIAL BARRIER DRESSING: Brand: ACTICOAT 7  4" X 5"

## (undated) DEVICE — 1.8MM DRILL BIT WITH DEPTH MARK/QC/110MM

## (undated) DEVICE — SPLINT THMB W4XL30IN FBRGLS PD PRECUT LTWT DURABLE FAST SET

## (undated) DEVICE — (D)PREP SKN CHLRAPRP APPL 26ML -- CONVERT TO ITEM 371833

## (undated) DEVICE — BNDG,ELSTC,MATRIX,STRL,4"X5YD,LF,HOOK&LP: Brand: MEDLINE

## (undated) DEVICE — PREP SKN CHLRAPRP APL 26ML STR --

## (undated) DEVICE — SURGICAL PROCEDURE PACK BASIC ST FRANCIS

## (undated) DEVICE — ROD EXT FIX 8X200 MM CARBON FIBER

## (undated) DEVICE — DRAPE, FILM SHEET, 44X65 STERILE: Brand: MEDLINE

## (undated) DEVICE — PADDING CAST COHESIVE 4 YDX3 IN HND TEARABLE COTTON SPEC 100

## (undated) DEVICE — SLING ARM 2-37INX8-17IN PCH -- MED

## (undated) DEVICE — 3M™ STERI-DRAPE™ X-RAY IMAGE INTENSIFIER DRAPE, 10 PER CARTON / 4 CARTONS PER CASE, 1013: Brand: STERI-DRAPE™

## (undated) DEVICE — SCREW EXT FIX L100MM DIA4MM THRD L20MM SHFT DIA3MM CORT S

## (undated) DEVICE — INTENDED FOR TISSUE SEPARATION, AND OTHER PROCEDURES THAT REQUIRE A SHARP SURGICAL BLADE TO PUNCTURE OR CUT.: Brand: BARD-PARKER ® STAINLESS STEEL BLADES

## (undated) DEVICE — SOLUTION IV 1000ML 0.9% SOD CHL

## (undated) DEVICE — ZIMMER® STERILE DISPOSABLE TOURNIQUET CUFF WITH PLC, DUAL PORT, SINGLE BLADDER, 18 IN. (46 CM)

## (undated) DEVICE — HANDPIECE SET WITH HIGH FLOW TIP AND SUCTION TUBE: Brand: INTERPULSE

## (undated) DEVICE — SPLINT THMB W4XL15IN FBRGLS PD PRECUT LTWT DURABLE FAST SET

## (undated) DEVICE — SUTURE VCRL RAPIDE SZ 4-0 L18IN ABSRB UD PS-3 L13MM 3/8 CIR VR494

## (undated) DEVICE — AMD ANTIMICROBIAL GAUZE SPONGES,12 PLY USP TYPE VII, 0.2% POLYHEXAMETHYLENE BIGUANIDE HCI (PHMB): Brand: CURITY

## (undated) DEVICE — 2.0MM DRILL BIT WITH DEPTH MARK/QC/140MM

## (undated) DEVICE — STRETCH BANDAGE ROLL: Brand: DERMACEA

## (undated) DEVICE — CLAMP EXT FIX DIA4MM DST RAD TI ALLY ADJ FOR SCHNZ SCR

## (undated) DEVICE — NEEDLE HYPO 25GA L1.5IN BLU POLYPR HUB S STL REG BVL STR

## (undated) DEVICE — STEINMANN PIN FOR TARGET DEVICE

## (undated) DEVICE — BNDG ELAS ESMARK 4INX12FT LF -- STRL

## (undated) DEVICE — SUTURE ETHLN SZ 2-0 L30IN NONABSORBABLE BLK L36MM FSLX 3/8 1674H

## (undated) DEVICE — Device

## (undated) DEVICE — SUTURE VCRL RAPIDE SZ 4-0 L18IN ABSRB UD L19MM PS-2 1/2 CIR VR496

## (undated) DEVICE — BUTTON SWITCH PENCIL BLADE ELECTRODE, HOLSTER: Brand: EDGE

## (undated) DEVICE — 2000CC GUARDIAN II: Brand: GUARDIAN